# Patient Record
Sex: MALE | Race: WHITE | NOT HISPANIC OR LATINO | Employment: FULL TIME | URBAN - METROPOLITAN AREA
[De-identification: names, ages, dates, MRNs, and addresses within clinical notes are randomized per-mention and may not be internally consistent; named-entity substitution may affect disease eponyms.]

---

## 2017-01-13 DIAGNOSIS — I48.91 ATRIAL FIBRILLATION, UNSPECIFIED TYPE: Primary | ICD-10-CM

## 2017-01-17 ENCOUNTER — INITIAL CONSULT (OUTPATIENT)
Dept: ELECTROPHYSIOLOGY | Facility: CLINIC | Age: 72
End: 2017-01-17
Payer: MEDICARE

## 2017-01-17 ENCOUNTER — HOSPITAL ENCOUNTER (OUTPATIENT)
Dept: CARDIOLOGY | Facility: CLINIC | Age: 72
Discharge: HOME | End: 2017-01-17
Payer: MEDICARE

## 2017-01-17 VITALS
HEIGHT: 68 IN | DIASTOLIC BLOOD PRESSURE: 80 MMHG | SYSTOLIC BLOOD PRESSURE: 122 MMHG | BODY MASS INDEX: 29.7 KG/M2 | WEIGHT: 196 LBS | HEART RATE: 68 BPM

## 2017-01-17 DIAGNOSIS — I48.20 CHRONIC ATRIAL FIBRILLATION: ICD-10-CM

## 2017-01-17 DIAGNOSIS — K63.5 POLYP OF COLON, UNSPECIFIED PART OF COLON, UNSPECIFIED TYPE: ICD-10-CM

## 2017-01-17 DIAGNOSIS — E13.9 DIABETES MELLITUS DUE TO ABNORMAL INSULIN: Primary | ICD-10-CM

## 2017-01-17 DIAGNOSIS — I48.91 ATRIAL FIBRILLATION, UNSPECIFIED TYPE: ICD-10-CM

## 2017-01-17 DIAGNOSIS — I10 ESSENTIAL HYPERTENSION: ICD-10-CM

## 2017-01-17 PROCEDURE — 99999 PR PBB SHADOW E&M-EST. PATIENT-LVL III: CPT | Mod: PBBFAC,,, | Performed by: INTERNAL MEDICINE

## 2017-01-17 PROCEDURE — 99205 OFFICE O/P NEW HI 60 MIN: CPT | Mod: S$PBB,,, | Performed by: INTERNAL MEDICINE

## 2017-01-17 PROCEDURE — 93010 ELECTROCARDIOGRAM REPORT: CPT | Mod: S$PBB,,, | Performed by: INTERNAL MEDICINE

## 2017-01-17 PROCEDURE — 99213 OFFICE O/P EST LOW 20 MIN: CPT | Mod: PBBFAC | Performed by: INTERNAL MEDICINE

## 2017-01-17 NOTE — MR AVS SNAPSHOT
[facility]  [address]  [facility] [address]  Phone: [phone]  Fax: [phone]                  Erasmo Cherry   2017 11:00 AM   Initial consult    Description:  Male : 1945   Provider:  Harvey Blake MD   Department:  [facility]           Reason for Visit     Atrial Fibrillation           Diagnoses this Visit        Comments    Diabetes mellitus due to abnormal insulin    -  Primary     Chronic atrial fibrillation         Essential hypertension         Polyp of colon, unspecified part of colon, unspecified type                To Do List           Future Appointments        Provider Department Dept Phone    2017 11:00 AM MD [facility] - Arrhythmia [phone]    2017 1:00 PM ECHO, [facility] - Echo/Stress Lab [phone]      Goals (5 Years of Data)     Cut out extra servings       Follow-Up and Disposition     Return if symptoms worsen or fail to improve.    Follow-up and Disposition History      [facility]     [facility] On Call Nurse Care Line -  Assistance  Registered nurses in the [facility] On Call Center provide clinical advisement, health education, appointment booking, and other advisory services.  Call for this free service at [phone].             Medications           Message regarding Medications     Verify the changes and/or additions to your medication regime listed below are the same as discussed with your clinician today.  If any of these changes or additions are incorrect, please notify your healthcare provider.             Verify that the below list of medications is an accurate representation of the medications you are currently taking.  If none reported, the list may be blank. If incorrect, please contact your healthcare provider. Carry this list with you in case of emergency.           Current Medications     aspirin (ECOTRIN) 81 MG EC tablet Take 81 mg by mouth once daily.      atorvastatin  "(LIPITOR) 10 MG tablet Take 1 tablet (10 mg total) by mouth once daily.    empagliflozin (JARDIANCE) 25 mg Tab Take 25 mg by mouth once daily.    FREESTYLE LITE STRIPS Strp USE TO TEST TWICE A DAY AS NEEDED    glipiZIDE (GLUCOTROL) 2.5 MG TR24 Take 1 tablet (2.5 mg total) by mouth 2 (two) times daily with meals.    lisinopril (PRINIVIL,ZESTRIL) 20 MG tablet Take 1 tablet (20 mg total) by mouth once daily.    metformin (GLUCOPHAGE-XR) 750 MG 24 hr tablet Take 1 tablet (750 mg total) by mouth 2 (two) times daily with meals.    VITAMIN D2 50,000 unit capsule TAKE 1 CAPSULE EVERY 7 DAYS    vitamin E 800 UNIT capsule Take 800 Units by mouth once daily.           Clinical Reference Information           Vital Signs - Last Recorded  Most recent update: 1/17/2017 10:23 AM by Leonides Regan MA    BP Pulse Ht Wt BMI    122/80 68 5' 8" (1.727 m) 88.9 kg (195 lb 15.8 oz) 29.8 kg/m2      Blood Pressure          Most Recent Value    BP  122/80      Allergies as of 1/17/2017     No Known Allergies      Immunizations Administered on Date of Encounter - 1/17/2017     None      Orders Placed During Today's Visit     Future Labs/Procedures Expected by Expires    2D echo with color flow doppler  As directed 1/17/2018      [facility] Sign-Up     Activating your [facility] account is as easy as 1-2-3!     1) Visit [email or web address], select Sign Up Now, enter this activation code and your date of birth, then select Next.  [email or web address]  Expires: 1/29/2017  8:24 AM      2) Create a username and password to use when you visit [facility] in the future and select a security question in case you lose your password and select Next.    3) Enter your e-mail address and click Sign Up!    Additional Information  If you have questions, please e-mail [email or web address] or call [phone] to talk to our [facility] staff. Remember, [facility] is NOT to be used for urgent needs. For medical emergencies, dial 911.         "

## 2017-01-17 NOTE — LETTER
January 17, 2017      Chichi Stack MD  [address]  [address]  [facility] [address]           [facility] - Arrhythmia  [address]  [facility] [address]  Phone: [phone]  Fax: [phone]          Patient: Erasmo Cherry   MR Number: [id number]   YOB: 1945   Date of Visit: 1/17/2017       Dear Dr. Chichi Stack:    Thank you for referring Erasmo Cherry to me for evaluation. Attached you will find relevant portions of my assessment and plan of care.    If you have questions, please do not hesitate to call me. I look forward to following Erasmo Cherry along with you.    Sincerely,    Harvey Blake MD    Enclosure  CC:  No Recipients    If you would like to receive this communication electronically, please contact [email or web address] or [phone] to request more information on [email or web address] [facility] access.    For providers and/or their staff who would like to refer a patient to [facility], please contact us through our one-stop-shop provider referral line, [facility], at [phone].    If you feel you have received this communication in error or would no longer like to receive these types of communications, please e-mail [email or web address]

## 2017-01-17 NOTE — PROGRESS NOTES
Subjective:    Patient ID:  Erasmo Cherry is a 71 y.o. male who presents for evaluation of Atrial Fibrillation      HPI 72 yo ffemale with Htn, DM, atrial fibrillation, colon polyps.  Presented to PCP for routine visit, ECG 12/14/16 revealed atrial fibrillation, rate controlled.  He was asymptomatic.  ECG today again reveals atrial fibrillation with controlled ventricular response.  Reports feeling fine.  Denies palpitations, shortness of breath, fatigue, syncope.  [place]  Rides his bike one the weekends.  He is the  of [employer] at [employer].  CHADSVASc is 3, on aspirin.    Review of Systems   Constitution: Negative. Negative for weakness and malaise/fatigue.   Cardiovascular: Negative for chest pain, dyspnea on exertion, irregular heartbeat, leg swelling, near-syncope, orthopnea, palpitations, paroxysmal nocturnal dyspnea and syncope.   Respiratory: Negative for cough and shortness of breath.    Neurological: Negative for dizziness and light-headedness.   All other systems reviewed and are negative.       Objective:    Physical Exam   Constitutional: He is oriented to person, place, and time. He appears well-developed and well-nourished.   Eyes: Conjunctivae are normal. No scleral icterus.   Neck: No JVD present. No tracheal deviation present.   Cardiovascular: Normal rate and normal heart sounds.  An irregularly irregular rhythm present. PMI is not displaced.    Pulmonary/Chest: Effort normal and breath sounds normal. No respiratory distress.   Abdominal: Soft. There is no hepatosplenomegaly. There is no tenderness.   Musculoskeletal: He exhibits no edema (lower extremity) or tenderness.   Neurological: He is alert and oriented to person, place, and time.   Skin: Skin is warm and dry. No rash noted.   Psychiatric: He has a normal mood and affect. His behavior is normal.         Assessment:       1. Diabetes mellitus due to abnormal insulin     2. Chronic atrial fibrillation    3. Essential hypertension    4. Polyp of colon, unspecified part of colon, unspecified type         Plan:           Atrial fibrillation, rate controlled and asymptomatic.  Discussed at length management options.  Given CHADSVASc is 3, I recommended anticoagulation.  He is going to consider this, and contact us if he would like to proceed.  Echo with color flow.  In terms of rhythm management, I offered DCCV to assess for symptomatic benefit, if he were to initiate anticoagulation.  I also indicated a rate control strategy would be reasonable.  Will schedule f/u once he contacts us with how he would like to proceed.

## 2017-01-19 ENCOUNTER — HOSPITAL ENCOUNTER (OUTPATIENT)
Dept: CARDIOLOGY | Facility: CLINIC | Age: 72
Discharge: HOME | End: 2017-01-19
Payer: MEDICARE

## 2017-01-19 ENCOUNTER — TELEPHONE (OUTPATIENT)
Dept: ELECTROPHYSIOLOGY | Facility: CLINIC | Age: 72
End: 2017-01-19

## 2017-01-19 DIAGNOSIS — I48.20 CHRONIC ATRIAL FIBRILLATION: ICD-10-CM

## 2017-01-19 LAB
AORTIC VALVE REGURGITATION: ABNORMAL
DIASTOLIC DYSFUNCTION: YES
ESTIMATED PA SYSTOLIC PRESSURE: 33.4
MITRAL VALVE MOBILITY: ABNORMAL
MITRAL VALVE REGURGITATION: ABNORMAL
RETIRED EF AND QEF - SEE NOTES: 40 (ref 55–65)
TRICUSPID VALVE REGURGITATION: ABNORMAL

## 2017-01-19 PROCEDURE — 93306 TTE W/DOPPLER COMPLETE: CPT | Mod: PBBFAC | Performed by: INTERNAL MEDICINE

## 2017-01-19 NOTE — TELEPHONE ENCOUNTER
Called patient, left message asking him to return my call.  EF is 40%.  I am going to recommend stress test and holter.  After holter will plan on adding beta blocker if HR will tolerate.  Will again recommend DCCV.

## 2017-01-27 ENCOUNTER — TELEPHONE (OUTPATIENT)
Dept: ELECTROPHYSIOLOGY | Facility: CLINIC | Age: 72
End: 2017-01-27

## 2017-01-27 NOTE — TELEPHONE ENCOUNTER
----- Message from Kimber Ortiz MA sent at 1/27/2017 10:36 AM CST -----  Contact: self  Pt. had an echo on 1/19. Pt.(). is calling for results of echo. Please [phone] cell

## 2017-01-30 ENCOUNTER — TELEPHONE (OUTPATIENT)
Dept: ELECTROPHYSIOLOGY | Facility: CLINIC | Age: 72
End: 2017-01-30

## 2017-01-30 DIAGNOSIS — I48.20 CHRONIC ATRIAL FIBRILLATION: Primary | ICD-10-CM

## 2017-01-30 NOTE — TELEPHONE ENCOUNTER
----- Message from Leonides Regan MA sent at 1/27/2017  1:52 PM CST -----  Contact: self  Giorgi ABRAHAM,    See messages. Can you please enter the orders for the holter monitor and stress test? I'll call the pt to schedule once done.    Thanks,  Leonides  ----- Message -----     From: Kimber Ortiz MA     Sent: 1/27/2017  10:36 AM       To: Hayden Gabriel Staff    Pt. had an echo on 1/19. Pt.(). is calling for results of echo. Please [phone] cell

## 2017-01-31 DIAGNOSIS — I48.91 ATRIAL FIBRILLATION, UNSPECIFIED TYPE: Primary | ICD-10-CM

## 2017-02-06 DIAGNOSIS — I48.91 ATRIAL FIBRILLATION, UNSPECIFIED TYPE: Primary | ICD-10-CM

## 2017-03-03 ENCOUNTER — CLINICAL SUPPORT (OUTPATIENT)
Dept: CARDIOLOGY | Facility: CLINIC | Age: 72
End: 2017-03-03
Payer: MEDICARE

## 2017-03-03 ENCOUNTER — CLINICAL SUPPORT (OUTPATIENT)
Dept: ELECTROPHYSIOLOGY | Facility: CLINIC | Age: 72
End: 2017-03-03
Payer: MEDICARE

## 2017-03-03 DIAGNOSIS — I48.91 ATRIAL FIBRILLATION, UNSPECIFIED TYPE: ICD-10-CM

## 2017-03-03 DIAGNOSIS — I48.20 CHRONIC ATRIAL FIBRILLATION: ICD-10-CM

## 2017-03-03 LAB — DIASTOLIC DYSFUNCTION: NO

## 2017-03-03 PROCEDURE — 93016 CV STRESS TEST SUPVJ ONLY: CPT | Mod: S$PBB,,, | Performed by: INTERNAL MEDICINE

## 2017-03-03 PROCEDURE — 93227 XTRNL ECG REC<48 HR R&I: CPT | Mod: S$PBB,,, | Performed by: INTERNAL MEDICINE

## 2017-03-03 PROCEDURE — 78492 MYOCRD IMG PET MLT RST&STRS: CPT | Mod: 26,S$PBB,, | Performed by: INTERNAL MEDICINE

## 2017-03-03 PROCEDURE — 93018 CV STRESS TEST I&R ONLY: CPT | Mod: S$PBB,,, | Performed by: INTERNAL MEDICINE

## 2017-03-03 PROCEDURE — 93226 XTRNL ECG REC<48 HR SCAN A/R: CPT | Mod: PBBFAC | Performed by: INTERNAL MEDICINE

## 2017-03-06 ENCOUNTER — TELEPHONE (OUTPATIENT)
Dept: ELECTROPHYSIOLOGY | Facility: CLINIC | Age: 72
End: 2017-03-06

## 2017-03-06 NOTE — TELEPHONE ENCOUNTER
----- Message from Harvey Blake MD sent at 3/3/2017  4:54 PM CST -----  Pet stress is negative, please relay to patient

## 2017-03-06 NOTE — TELEPHONE ENCOUNTER
"Pt informed of negative Pet Stress Test results per Dr. Blake. Also informed results have already been released to his PP. He has an appt with PCP in "soon" and he will call me then to follow up and discuss possibly initiating Blood thinner. Denied further questions, needs and concerns.   "

## 2017-03-07 ENCOUNTER — TELEPHONE (OUTPATIENT)
Dept: ELECTROPHYSIOLOGY | Facility: CLINIC | Age: 72
End: 2017-03-07

## 2017-03-07 NOTE — TELEPHONE ENCOUNTER
----- Message from Harvey Blake MD sent at 3/7/2017  6:49 AM CST -----  Monitor reveals adequate control of HR.  Not likely going to be able to add beta blocker.  Again recommend initiation of anticoagulation and DCCV.

## 2017-03-08 DIAGNOSIS — Z86.010 PERSONAL HISTORY OF COLONIC POLYPS: Primary | ICD-10-CM

## 2017-03-08 RX ORDER — POLYETHYLENE GLYCOL 3350, SODIUM SULFATE ANHYDROUS, SODIUM BICARBONATE, SODIUM CHLORIDE, POTASSIUM CHLORIDE 236; 22.74; 6.74; 5.86; 2.97 G/4L; G/4L; G/4L; G/4L; G/4L
4 POWDER, FOR SOLUTION ORAL ONCE
Qty: 4000 ML | Refills: 0 | Status: SHIPPED | OUTPATIENT
Start: 2017-03-08 | End: 2017-03-08

## 2017-03-23 ENCOUNTER — LAB VISIT (OUTPATIENT)
Dept: LAB | Facility: OTHER | Age: 72
End: 2017-03-23
Attending: INTERNAL MEDICINE
Payer: MEDICARE

## 2017-03-23 DIAGNOSIS — R74.8 ELEVATED LIVER ENZYMES: ICD-10-CM

## 2017-03-23 DIAGNOSIS — E11.65 TYPE 2 DIABETES MELLITUS WITH HYPERGLYCEMIA, WITHOUT LONG-TERM CURRENT USE OF INSULIN: ICD-10-CM

## 2017-03-23 LAB
ALBUMIN SERPL BCP-MCNC: 4.3 G/DL
ALP SERPL-CCNC: 45 U/L
ALT SERPL W/O P-5'-P-CCNC: 23 U/L
ANION GAP SERPL CALC-SCNC: 12 MMOL/L
AST SERPL-CCNC: 21 U/L
BILIRUB DIRECT SERPL-MCNC: 0.8 MG/DL
BILIRUB SERPL-MCNC: 2.2 MG/DL
BUN SERPL-MCNC: 20 MG/DL
CALCIUM SERPL-MCNC: 9.8 MG/DL
CHLORIDE SERPL-SCNC: 105 MMOL/L
CHOLEST/HDLC SERPL: 2.5 {RATIO}
CO2 SERPL-SCNC: 24 MMOL/L
CREAT SERPL-MCNC: 1.2 MG/DL
EST. GFR  (AFRICAN AMERICAN): >60 ML/MIN/1.73 M^2
EST. GFR  (NON AFRICAN AMERICAN): >60 ML/MIN/1.73 M^2
ESTIMATED AVG GLUCOSE: 146 MG/DL
GLUCOSE SERPL-MCNC: 126 MG/DL
HBA1C MFR BLD HPLC: 6.7 %
HDL/CHOLESTEROL RATIO: 40.1 %
HDLC SERPL-MCNC: 137 MG/DL
HDLC SERPL-MCNC: 55 MG/DL
LDLC SERPL CALC-MCNC: 65 MG/DL
NONHDLC SERPL-MCNC: 82 MG/DL
POTASSIUM SERPL-SCNC: 4.7 MMOL/L
PROT SERPL-MCNC: 7.4 G/DL
SODIUM SERPL-SCNC: 141 MMOL/L
T4 FREE SERPL-MCNC: 1.01 NG/DL
TRIGL SERPL-MCNC: 85 MG/DL
TSH SERPL DL<=0.005 MIU/L-ACNC: 4.03 UIU/ML

## 2017-03-23 PROCEDURE — 83036 HEMOGLOBIN GLYCOSYLATED A1C: CPT

## 2017-03-23 PROCEDURE — 80061 LIPID PANEL: CPT

## 2017-03-23 PROCEDURE — 80076 HEPATIC FUNCTION PANEL: CPT

## 2017-03-23 PROCEDURE — 84439 ASSAY OF FREE THYROXINE: CPT

## 2017-03-23 PROCEDURE — 80048 BASIC METABOLIC PNL TOTAL CA: CPT

## 2017-03-23 PROCEDURE — 36415 COLL VENOUS BLD VENIPUNCTURE: CPT

## 2017-03-23 PROCEDURE — 84443 ASSAY THYROID STIM HORMONE: CPT

## 2017-03-27 ENCOUNTER — LAB VISIT (OUTPATIENT)
Dept: LAB | Facility: OTHER | Age: 72
End: 2017-03-27
Attending: INTERNAL MEDICINE
Payer: MEDICARE

## 2017-03-27 DIAGNOSIS — C61 PROSTATE CANCER: ICD-10-CM

## 2017-03-27 LAB — COMPLEXED PSA SERPL-MCNC: <0.01 NG/ML

## 2017-03-27 PROCEDURE — 84153 ASSAY OF PSA TOTAL: CPT

## 2017-03-27 PROCEDURE — 36415 COLL VENOUS BLD VENIPUNCTURE: CPT

## 2017-04-10 ENCOUNTER — SURGERY (OUTPATIENT)
Age: 72
End: 2017-04-10

## 2017-04-10 ENCOUNTER — HOSPITAL ENCOUNTER (OUTPATIENT)
Facility: HOSPITAL | Age: 72
Discharge: HOME | End: 2017-04-10
Attending: INTERNAL MEDICINE | Admitting: INTERNAL MEDICINE
Payer: MEDICARE

## 2017-04-10 ENCOUNTER — ANESTHESIA EVENT (OUTPATIENT)
Dept: ENDOSCOPY | Facility: HOSPITAL | Age: 72
End: 2017-04-10
Payer: MEDICARE

## 2017-04-10 ENCOUNTER — ANESTHESIA (OUTPATIENT)
Dept: ENDOSCOPY | Facility: HOSPITAL | Age: 72
End: 2017-04-10
Payer: OTHER GOVERNMENT

## 2017-04-10 VITALS
HEART RATE: 55 BPM | HEIGHT: 68 IN | WEIGHT: 190 LBS | DIASTOLIC BLOOD PRESSURE: 68 MMHG | SYSTOLIC BLOOD PRESSURE: 115 MMHG | RESPIRATION RATE: 18 BRPM | BODY MASS INDEX: 28.79 KG/M2 | OXYGEN SATURATION: 99 % | TEMPERATURE: 98 F

## 2017-04-10 DIAGNOSIS — R79.89 ELEVATED LFTS: ICD-10-CM

## 2017-04-10 DIAGNOSIS — K63.5 POLYP OF COLON, UNSPECIFIED PART OF COLON, UNSPECIFIED TYPE: Primary | ICD-10-CM

## 2017-04-10 LAB — POCT GLUCOSE: 149 MG/DL (ref 70–110)

## 2017-04-10 PROCEDURE — G0105 COLORECTAL SCRN; HI RISK IND: HCPCS | Mod: ,,, | Performed by: INTERNAL MEDICINE

## 2017-04-10 PROCEDURE — 25000003 PHARM REV CODE 250: Performed by: NURSE ANESTHETIST, CERTIFIED REGISTERED

## 2017-04-10 PROCEDURE — 25000003 PHARM REV CODE 250: Performed by: INTERNAL MEDICINE

## 2017-04-10 PROCEDURE — 37000008 HC ANESTHESIA 1ST 15 MINUTES: Performed by: INTERNAL MEDICINE

## 2017-04-10 PROCEDURE — G0105 COLORECTAL SCRN; HI RISK IND: HCPCS | Performed by: INTERNAL MEDICINE

## 2017-04-10 PROCEDURE — D9220A PRA ANESTHESIA: Mod: 33,CRNA,, | Performed by: NURSE ANESTHETIST, CERTIFIED REGISTERED

## 2017-04-10 PROCEDURE — 37000009 HC ANESTHESIA EA ADD 15 MINS: Performed by: INTERNAL MEDICINE

## 2017-04-10 PROCEDURE — D9220A PRA ANESTHESIA: Mod: 33,ANES,, | Performed by: ANESTHESIOLOGY

## 2017-04-10 PROCEDURE — 82962 GLUCOSE BLOOD TEST: CPT | Performed by: INTERNAL MEDICINE

## 2017-04-10 PROCEDURE — 63600175 PHARM REV CODE 636 W HCPCS: Performed by: NURSE ANESTHETIST, CERTIFIED REGISTERED

## 2017-04-10 RX ORDER — SODIUM CHLORIDE 9 MG/ML
INJECTION, SOLUTION INTRAVENOUS CONTINUOUS
Status: DISCONTINUED | OUTPATIENT
Start: 2017-04-10 | End: 2017-04-10 | Stop reason: HOSPADM

## 2017-04-10 RX ORDER — LIDOCAINE HCL/PF 100 MG/5ML
SYRINGE (ML) INTRAVENOUS
Status: DISCONTINUED | OUTPATIENT
Start: 2017-04-10 | End: 2017-04-10

## 2017-04-10 RX ORDER — PROPOFOL 10 MG/ML
VIAL (ML) INTRAVENOUS CONTINUOUS PRN
Status: DISCONTINUED | OUTPATIENT
Start: 2017-04-10 | End: 2017-04-10

## 2017-04-10 RX ORDER — PROPOFOL 10 MG/ML
VIAL (ML) INTRAVENOUS
Status: DISCONTINUED | OUTPATIENT
Start: 2017-04-10 | End: 2017-04-10

## 2017-04-10 RX ADMIN — PROPOFOL 150 MCG/KG/MIN: 10 INJECTION, EMULSION INTRAVENOUS at 08:04

## 2017-04-10 RX ADMIN — PROPOFOL 80 MG: 10 INJECTION, EMULSION INTRAVENOUS at 08:04

## 2017-04-10 RX ADMIN — SODIUM CHLORIDE: 0.9 INJECTION, SOLUTION INTRAVENOUS at 07:04

## 2017-04-10 RX ADMIN — LIDOCAINE HYDROCHLORIDE 40 MG: 20 INJECTION, SOLUTION INTRAVENOUS at 08:04

## 2017-04-10 NOTE — IP AVS SNAPSHOT
[facility]  [address]  [facility] [address]  Phone: [phone]           Patient Discharge Instructions   Our goal is to set you up for success. This packet includes information on your condition, medications, and your home care.  It will help you care for yourself to prevent having to return to the hospital.     Please ask your nurse if you have any questions.      There are many details to remember when preparing to leave the hospital. Here is what you will need to do:    1. Take your medicine. If you are prescribed medications, review your Medication List on the following pages. You may have new medications to  at the pharmacy and others that you'll need to stop taking. Review the instructions for how and when to take your medications. Talk with your doctor or nurses if you are unsure of what to do.     2. Go to your follow-up appointments. Specific follow-up information is listed in the following pages. Your may be contacted by a nurse or clinical provider about future appointments. Be sure we have all of the phone numbers to reach you. Please contact your provider's office if you are unable to make an appointment.     3. Watch for warning signs. Your doctor or nurse will give you detailed warning signs to watch for and when to call for assistance. These instructions may also include educational information about your condition. If you experience any of warning signs to your health, call your doctor.           [facility]  Unless otherwise directed by your provider, please   contact [facility], our nurse care line   that is available for 24/7 assistance.     [phone] (toll-free)     Registered nurses in the [facility] Center   provide: appointment scheduling, clinical advisement, health education, and other advisory services.                  ** Verify the list of medication(s) below is accurate and up to date. Carry this with you in case of  emergency. If your medications have changed, please notify your healthcare provider.             Medication List      CONTINUE taking these medications        Additional Info                      aspirin 81 MG EC tablet   Commonly known as:  ECOTRIN   Refills:  0   Dose:  81 mg    Instructions:  Take 81 mg by mouth once daily.     Begin Date    AM    Noon    PM    Bedtime       atorvastatin 10 MG tablet   Commonly known as:  LIPITOR   Quantity:  90 tablet   Refills:  1   Dose:  10 mg    Instructions:  Take 1 tablet (10 mg total) by mouth once daily.     Begin Date    AM    Noon    PM    Bedtime       empagliflozin 25 mg Tab   Commonly known as:  JARDIANCE   Quantity:  90 tablet   Refills:  1   Dose:  25 mg    Instructions:  Take 25 mg by mouth once daily.     Begin Date    AM    Noon    PM    Bedtime       FREESTYLE LITE STRIPS Strp   Quantity:  200 strip   Refills:  3   Generic drug:  blood sugar diagnostic    Instructions:  USE TO TEST TWICE A DAY AS NEEDED     Begin Date    AM    Noon    PM    Bedtime       glipiZIDE 2.5 MG Tr24   Commonly known as:  GLUCOTROL   Quantity:  180 tablet   Refills:  1   Dose:  2.5 mg    Instructions:  Take 1 tablet (2.5 mg total) by mouth 2 (two) times daily with meals.     Begin Date    AM    Noon    PM    Bedtime       lisinopril 20 MG tablet   Commonly known as:  PRINIVIL,ZESTRIL   Quantity:  90 tablet   Refills:  3   Dose:  20 mg    Instructions:  Take 1 tablet (20 mg total) by mouth once daily.     Begin Date    AM    Noon    PM    Bedtime       metformin 750 MG 24 hr tablet   Commonly known as:  GLUCOPHAGE-XR   Quantity:  180 tablet   Refills:  3   Dose:  750 mg    Instructions:  Take 1 tablet (750 mg total) by mouth 2 (two) times daily with meals.     Begin Date    AM    Noon    PM    Bedtime       VITAMIN D2 50,000 unit Cap   Quantity:  15 capsule   Refills:  1   Generic drug:  ergocalciferol    Instructions:  TAKE 1 CAPSULE EVERY 7 DAYS     Begin Date    AM    Noon    PM     "Bedtime       vitamin E 800 UNIT capsule   Refills:  0   Dose:  800 Units    Instructions:  Take 800 Units by mouth once daily.     Begin Date    AM    Noon    PM    Bedtime                  Please bring to all follow up appointments:    1. A copy of your discharge instructions.  2. All medicines you are currently taking in their original bottles.  3. Identification and insurance card.    Please arrive 15 minutes ahead of scheduled appointment time.    Please call 24 hours in advance if you must reschedule your appointment and/or time.        Your Scheduled Appointments     Aug 09, 2017  2:00 PM CDT   Established Patient with Chichi Stack MD    [facility]    [address], [address]  [facility] [address]   [phone]              Your Future Surgeries/Procedures     Apr 10, 2017   Surgery with Rubin Solano MD   [facility])    [address] [address] [phone]                Discharge Instructions     Future Orders    Diet general     Questions:    Total calories:      Fat restriction, if any:      Protein restriction, if any:      Na restriction, if any:      Fluid restriction:      Additional restrictions:          Admission Information     Date & Time Provider Department CSN    4/10/2017  7:16 AM Rubin Solano MD [facility] [id number]      Care Providers     Provider Role Specialty Primary office phone    Rubin Solano MD Attending Provider Gastroenterology [phone]    Rubin Solano MD Surgeon  Gastroenterology [phone]      Your Vitals Were     BP Pulse Temp Resp Height Weight    86/57 (BP Location: Left arm, Patient Position: Lying, BP Method: Automatic) 50 97.8 °F (36.6 °C) (Oral) 16 5' 8" (1.727 m) 86.2 kg (190 lb)    SpO2 BMI             95% 28.89 kg/m2         Recent Lab Values        2/10/2014 10/17/2014 2/9/2015 9/8/2015 1/21/2016 4/4/2016 12/13/2016 3/23/2017      8:34 AM  7:35 AM  " 7:09 AM  7:08 AM  7:17 AM  7:09 AM  7:05 AM  7:09 AM    A1C 9.4 (H) 7.5 (H) 7.6 (H) 7.5 (H) 7.5 (H) 7.6 (H) 8.3 (H) 6.7 (H)    Comment for A1C at  7:05 AM on 12/13/2016:  According to ADA guidelines, hemoglobin A1C <7.0% represents  optimal control in non-pregnant diabetic patients.  Different  metrics may apply to specific populations.   Standards of Medical Care in Diabetes - 2016.  For the purpose of screening for the presence of diabetes:  <5.7%     Consistent with the absence of diabetes  5.7-6.4%  Consistent with increasing risk for diabetes   (prediabetes)  >or=6.5%  Consistent with diabetes  Currently no consensus exists for use of hemoglobin A1C  for diagnosis of diabetes for children.      Comment for A1C at  7:09 AM on 3/23/2017:  According to ADA guidelines, hemoglobin A1C <7.0% represents  optimal control in non-pregnant diabetic patients.  Different  metrics may apply to specific populations.   Standards of Medical Care in Diabetes - 2016.  For the purpose of screening for the presence of diabetes:  <5.7%     Consistent with the absence of diabetes  5.7-6.4%  Consistent with increasing risk for diabetes   (prediabetes)  >or=6.5%  Consistent with diabetes  Currently no consensus exists for use of hemoglobin A1C  for diagnosis of diabetes for children.        Allergies as of 4/10/2017     No Known Allergies      Advance Directives     An advance directive is a document which, in the event you are no longer able to make decisions for yourself, tells your healthcare team what kind of treatment you do or do not want to receive, or who you would like to make those decisions for you.  If you do not currently have an advance directive, [facility] encourages you to create one.  For more information call:  [phone]), [phone] ([phone]),  or log on to [email or web address].        Language Assistance Services     ATTENTION: Language assistance services are available, free of charge.  Please call [phone].      ATENCIÓN: Si habla español, tiene a parker disposición servicios gratuitos de asistencia lingüística. Llame al [phone].     CHÚ Ý: N?u b?n nói Ti?ng Vi?t, có các d?ch v? h? tr? ngôn ng? mi?n phí dành cho b?n. G?i s? [phone].        Diabetes Discharge Instructions                                    [facility] complies with applicable Federal civil rights laws and does not discriminate on the basis of race, color, national origin, age, disability, or sex.

## 2017-04-10 NOTE — ANESTHESIA POSTPROCEDURE EVALUATION
"Anesthesia Post Evaluation    Patient: Erasmo Cherry    Procedure(s) Performed: Procedure(s) (LRB):  COLONOSCOPY (N/A)    Final Anesthesia Type: general  Patient location during evaluation: GI PACU  Patient participation: Yes- Able to Participate  Level of consciousness: awake and alert and oriented  Post-procedure vital signs: reviewed and stable  Pain management: adequate  Airway patency: patent  PONV status at discharge: No PONV  Anesthetic complications: no      Cardiovascular status: hemodynamically stable  Respiratory status: unassisted, spontaneous ventilation and room air  Hydration status: euvolemic  Follow-up not needed.        Visit Vitals    /68 (BP Location: Left arm, Patient Position: Sitting, BP Method: Automatic)    Pulse (!) 55    Temp 36.6 °C (97.8 °F) (Oral)    Resp 18    Ht 5' 8" (1.727 m)    Wt 86.2 kg (190 lb)    SpO2 99%    BMI 28.89 kg/m2       Pain/April Score: Pain Assessment Performed: Yes (4/10/2017  9:18 AM)  Presence of Pain: denies (4/10/2017  9:18 AM)  April Score: 10 (4/10/2017  9:03 AM)      "

## 2017-04-10 NOTE — ANESTHESIA RELEASE NOTE
"Anesthesia Release from PACU Note    Patient: Erasmo Cherry    Procedure(s) Performed: Procedure(s) (LRB):  COLONOSCOPY (N/A)    Anesthesia type: general    Post pain: Adequate analgesia    Post assessment: no apparent anesthetic complications, tolerated procedure well and no evidence of recall    Last Vitals:   Visit Vitals    /68 (BP Location: Left arm, Patient Position: Sitting, BP Method: Automatic)    Pulse (!) 55    Temp 36.6 °C (97.8 °F) (Oral)    Resp 18    Ht 5' 8" (1.727 m)    Wt 86.2 kg (190 lb)    SpO2 99%    BMI 28.89 kg/m2       Post vital signs: stable    Level of consciousness: awake, alert  and oriented    Nausea/Vomiting: no nausea/no vomiting    Complications: none    Airway Patency: patent    Respiratory: unassisted, spontaneous ventilation, room air    Cardiovascular: stable and blood pressure at baseline    Hydration: euvolemic  "

## 2017-04-10 NOTE — INTERVAL H&P NOTE
The patient has been examined and the H&P has been reviewed:    I concur with the findings and no changes have occurred since H&P was written.     History and Exam unchanged from visit. TA on last procedure    Procedure - Colonoscopy  Neck - supple  Plan of anesthesia - General  ASA - per anesthesia  Mallampati - per anesthesia  Anesthesia problems - no  Family history of anesthesia problems - no      Anesthesia/Surgery risks, benefits and alternative options discussed and understood by patient/family.          There are no hospital problems to display for this patient.

## 2017-04-10 NOTE — TRANSFER OF CARE
"Anesthesia Transfer of Care Note    Patient: Erasmo Cherry    Procedure(s) Performed: Procedure(s) (LRB):  COLONOSCOPY (N/A)    Patient location: PACU    Anesthesia Type: general    Transport from OR: Transported from OR on room air with adequate spontaneous ventilation    Post pain: adequate analgesia    Post assessment: no apparent anesthetic complications and tolerated procedure well    Post vital signs: stable    Level of consciousness: sedated    Nausea/Vomiting: no nausea/vomiting    Complications: none          Last vitals:   Visit Vitals    BP (!) 82/52 (BP Location: Left arm, Patient Position: Lying, BP Method: Automatic)    Pulse (!) 50    Temp 36.6 °C (97.8 °F) (Oral)    Resp 16    Ht 5' 8" (1.727 m)    Wt 86.2 kg (190 lb)    SpO2 95%    BMI 28.89 kg/m2     "

## 2017-04-10 NOTE — ANESTHESIA PREPROCEDURE EVALUATION
04/10/2017  Erasmo Cherry is a 71 y.o., male.    [facility] Anesthesia Evaluation    I have reviewed the Patient Summary Reports.    I have reviewed the Nursing Notes.   I have reviewed the Medications.     Review of Systems  Anesthesia Hx:  No problems with previous Anesthesia  History of prior surgery of interest to airway management or planning: Previous anesthesia: General Denies Family Hx of Anesthesia complications.   Denies Personal Hx of Anesthesia complications.   Social:  Non-Smoker    Hematology/Oncology:  Hematology Normal       -- Cancer in past history:  surgery  Oncology Comments: Prostate CA; basal cell carcinoma     EENT/Dental:EENT/Dental Normal   Cardiovascular:   Exercise tolerance: good Hypertension Dysrhythmias atrial fibrillation    Pulmonary:   Sleep Apnea, CPAP    Renal/:   Hx prostate CA, s/p prostatectomy   Hepatic/GI:   Liver Disease, Fatty liver   Musculoskeletal:  Musculoskeletal Normal    Neurological:   Guillain-[place]   Endocrine:   Diabetes, type 2    Dermatological:  Skin Normal    Psych:  Psychiatric Normal           Physical Exam  General:  Well nourished    Airway/Jaw/Neck:  Airway Findings: Mouth Opening: Normal Tongue: Normal  General Airway Assessment: Adult, Average  Mallampati: II  Improves to II with phonation.  TM Distance: Normal, at least 6 cm        Eyes/Ears/Nose:  EYES/EARS/NOSE FINDINGS: Normal   Dental:  Dental Findings: In tact   Chest/Lungs:  Chest/Lungs Findings: Clear to auscultation, Normal Respiratory Rate     Heart/Vascular:  Heart Findings: Rate: Normal  Rhythm: Regular Rhythm  Sounds: Normal  Heart murmur: negative Vascular Findings: Normal    Abdomen:  Abdomen Findings: Normal    Musculoskeletal:  Musculoskeletal Findings: Normal   Skin:  Skin Findings: Normal    Mental Status:  Mental Status Findings:  Cooperative, Alert and Oriented          Anesthesia Plan  Type of Anesthesia, risks & benefits discussed:  Anesthesia Type:  general  Patient's Preference:   Intra-op Monitoring Plan:   Intra-op Monitoring Plan Comments:   Post Op Pain Control Plan:   Post Op Pain Control Plan Comments:   Induction:   IV  Beta Blocker:  Patient is not currently on a Beta-Blocker (No further documentation required).       Informed Consent: Patient understands risks and agrees with Anesthesia plan.  Questions answered. Anesthesia consent signed with patient.  ASA Score: 2     Day of Surgery Review of History & Physical:            Ready For Surgery From Anesthesia Perspective.

## 2017-04-10 NOTE — H&P (VIEW-ONLY)
"Subjective:       Patient ID: Erasmo Cherry is a 71 y.o. male.    Chief Complaint: Follow-up    Erasmo Cherry is a 71 y.o. male here for a routine visit on .3/27/2017  .     HPI   He is here for follow up.  Doing well. Will be 72 in May.  No new issues. Got letter to see hep again and will.  PSa due in April and sees uro in July.   No s/s of hypoglycemia.  Better with diet.  Eating less.    Did see cards about afib. Stress and holter ok.  Declined blood thinner at time but willing to proceed.        Blood pressure 118/74, pulse 66, temperature 98.4 °F (36.9 °C), height 5' 8" (1.727 m), weight 89.4 kg (197 lb).  Wt Readings from Last 3 Encounters:   03/27/17 89.4 kg (197 lb)   01/17/17 88.9 kg (195 lb 15.8 oz)   12/15/16 91.1 kg (200 lb 12.8 oz)       Review of Systems   Constitutional: Negative for chills, fatigue, fever and unexpected weight change.   HENT: Negative for postnasal drip.    Eyes: Negative.    Respiratory: Negative for cough, chest tightness, shortness of breath and wheezing.    Gastrointestinal: Negative for abdominal distention, abdominal pain, constipation, diarrhea and vomiting.   Genitourinary: Negative for difficulty urinating, frequency, hematuria and urgency.   Musculoskeletal: Negative.  Negative for back pain, joint swelling and myalgias.   Skin: Negative for pallor.   Neurological: Negative for dizziness, syncope, weakness, light-headedness and headaches.   Psychiatric/Behavioral: Negative for agitation, confusion and sleep disturbance.       Objective:      Physical Exam   Constitutional: He is oriented to person, place, and time. He appears well-developed and well-nourished. No distress.   HENT:   Head: Normocephalic and atraumatic.   Mouth/Throat: No oropharyngeal exudate.   Eyes: Pupils are equal, round, and reactive to light. Right eye exhibits no discharge. Left eye exhibits no discharge.   Neck: Normal range of motion. Neck supple. No JVD present. No thyromegaly present. "   Cardiovascular: Normal rate, regular rhythm and normal heart sounds.  Exam reveals no gallop and no friction rub.    No murmur heard.  Pulmonary/Chest: Effort normal and breath sounds normal. No stridor.   Abdominal: Soft. Bowel sounds are normal. He exhibits no distension and no mass. There is no tenderness. There is no rebound and no guarding.   Musculoskeletal: Normal range of motion. He exhibits no edema or tenderness.   Lymphadenopathy:     He has no cervical adenopathy.   Neurological: He is alert and oriented to person, place, and time. No cranial nerve deficit. Coordination normal.   Skin: Skin is warm and dry. No rash noted. No erythema. No pallor.   Psychiatric: He has a normal mood and affect. His behavior is normal. Judgment normal.     Reviewed labs.       Assessment & Plan       Prostate cancer  -     PROSTATE SPECIFIC ANTIGEN, DIAGNOSTIC; Future; Expected date: 3/27/17    1.  DM.  Cont meds but drop one glip a day if bs go lower.  Reviewed hypo.  They are much better.   2.TSH elevated.  No s/s of hypothyroid.  Will monitor.    3.  Will reach out to Dr Blake and see what blood thinner he wants to start and will arange follow up appt with dr Blake too.   4. Pt will seee hep. Due in May.    5. PSA to be done in April since due.    See me in July/ august or sooner prn.

## 2017-04-17 ENCOUNTER — TELEPHONE (OUTPATIENT)
Dept: ENDOSCOPY | Facility: HOSPITAL | Age: 72
End: 2017-04-17

## 2017-05-01 ENCOUNTER — TELEPHONE (OUTPATIENT)
Dept: ELECTROPHYSIOLOGY | Facility: CLINIC | Age: 72
End: 2017-05-01

## 2017-05-01 NOTE — TELEPHONE ENCOUNTER
Called pt to offer 5/3/17 appt with Dr. Blake. Pt declined, because he will be in [place].  Will try to call next week to schedule

## 2017-05-01 NOTE — TELEPHONE ENCOUNTER
----- Message from Leonides Regan MA sent at 4/28/2017  4:33 PM CDT -----  Pt need a f/u appt from his colonoscopy and it's needed sooner than June/July. Please call Darell at the number listed.     Thanks

## 2017-06-14 ENCOUNTER — TELEPHONE (OUTPATIENT)
Dept: SLEEP MEDICINE | Facility: CLINIC | Age: 72
End: 2017-06-14

## 2017-06-14 ENCOUNTER — OFFICE VISIT (OUTPATIENT)
Dept: SLEEP MEDICINE | Facility: CLINIC | Age: 72
End: 2017-06-14
Attending: INTERNAL MEDICINE
Payer: MEDICARE

## 2017-06-14 VITALS
WEIGHT: 199.06 LBS | HEART RATE: 66 BPM | BODY MASS INDEX: 30.17 KG/M2 | SYSTOLIC BLOOD PRESSURE: 117 MMHG | DIASTOLIC BLOOD PRESSURE: 76 MMHG | HEIGHT: 68 IN

## 2017-06-14 DIAGNOSIS — G47.33 OSA (OBSTRUCTIVE SLEEP APNEA): Primary | ICD-10-CM

## 2017-06-14 PROCEDURE — 1126F AMNT PAIN NOTED NONE PRSNT: CPT | Mod: ,,, | Performed by: PSYCHIATRY & NEUROLOGY

## 2017-06-14 PROCEDURE — 99213 OFFICE O/P EST LOW 20 MIN: CPT | Mod: PBBFAC | Performed by: PSYCHIATRY & NEUROLOGY

## 2017-06-14 PROCEDURE — 1159F MED LIST DOCD IN RCRD: CPT | Mod: ,,, | Performed by: PSYCHIATRY & NEUROLOGY

## 2017-06-14 PROCEDURE — 99999 PR PBB SHADOW E&M-EST. PATIENT-LVL III: CPT | Mod: PBBFAC,,, | Performed by: PSYCHIATRY & NEUROLOGY

## 2017-06-14 PROCEDURE — 99203 OFFICE O/P NEW LOW 30 MIN: CPT | Mod: S$PBB,,, | Performed by: PSYCHIATRY & NEUROLOGY

## 2017-06-14 NOTE — LETTER
June 14, 2017      Chichi Stack MD  [address]  [address]  [facility] [address]           [facility]  [address]  [facility] [address]  Phone: [phone]          Patient: Erasmo Cherry   MR Number: [id number]   YOB: 1945   Date of Visit: 6/14/2017       Dear Dr. Chichi Stack:    Thank you for referring Erasmo Cherry to me for evaluation. Attached you will find relevant portions of my assessment and plan of care.    If you have questions, please do not hesitate to call me. I look forward to following Erasmo Cherry along with you.    Sincerely,    Brayan Llanes MD    Enclosure  CC:  No Recipients    If you would like to receive this communication electronically, please contact [email or web address] or [phone] to request more information on [employer] access.    For providers and/or their staff who would like to refer a patient to [facility], please contact us through our one-stop-shop provider referral line, [facility], at [phone].    If you feel you have received this communication in error or would no longer like to receive these types of communications, please e-mail [email or web address]

## 2017-06-14 NOTE — PATIENT INSTRUCTIONS
Treatment options of sleep apnea discussed. In light of chronic atrial fibrillation, will plan to continue CPAP as opposed to switching to oral appliance    1. Order for new CPAP supplies; Nasal mask per patient's preference  2. Continue CPAP set at 9 cm  3. Set up with local Medicare approved DME  4. Patient will need to obtain his baseline sleep study for medical documentation with us and at his DME for Medicare supply coverage.

## 2017-06-14 NOTE — TELEPHONE ENCOUNTER
Faxed over Authorization of Release Form to [facility] fax# [phone]. Requesting sleep study reports per Dr. Llanes.

## 2017-06-14 NOTE — PROGRESS NOTES
This 72 y.o. male patient presents for the evaluation of obstructive sleep apnea.    PRIOR SLEEP STUDY at outside facility.    Reference to baseline study on titration described baseline AHI 10.2  TITRATION SLEEP STUDY [facility] 6-10-09: Titrated from 4 to 9 cm to eliminate ABE; supine REM seen; final AHI 0    ESS = 4    He has been using CPAP successfully but needs new supplies;  No discontinuation in using CPAP since starting in 2009.    ABE symptoms: snoring and witnessed apnea  These symptoms are resolved with CPAP.  He benefits from CPAP use.    New diagnosis of afib, started xarelto    SLEEP ROUTINE:   Bed partner: none   Time to bed: 9-10 pm   Sleep onset latency: 20-30 mins   Disruptions or awakenings: 3-4   Wakeup time: 5:30-6:30 am   Perceived sleep quality: fair   Daytime naps: none    Past Medical History:   Diagnosis Date    Atrial fibrillation     Basal cell carcinoma     right upper back, left upper arm     Colon polyps     tubular adenoma, repeat in 2016    Diabetes mellitus     Guillain-[place]     patient denies history of this condition    Hypertension     Prostate CA     Prostate cancer        Past Surgical History:   Procedure Laterality Date    BASAL CELL CARCINOMA EXCISION      COLONOSCOPY N/A 4/10/2017    Procedure: COLONOSCOPY;  Surgeon: Rubin Solano MD;  Location: [facility] ([address] [place] [address]);  Service: Endoscopy;  Laterality: N/A;    PROSTATECTOMY      TONSILLECTOMY         Family History   Problem Relation Age of Onset    Diabetes Mother     Heart disease Father     Asthma Sister     Diabetes Sister     Skin cancer Neg Hx     Melanoma Neg Hx        Social History   Substance Use Topics    Smoking status: Never Smoker    Smokeless tobacco: Not on file    Alcohol use Yes      Comment: socially       ALLERGIES: Reviewed in EPIC    CURRENT MEDICATIONS: Reviewed in EPIC    REVIEW OF SYSTEMS:  Sleep related symptoms as per HPI;    Denies weight gain;    Denies  "sinus problems;    Denies dyspnea;    Denies palpitations;    Denies acid reflux;    Denies polyuria;    Denies headaches;    Denies mood disturbance;    Denies anemia;    Otherwise, a balance of systems reviewed is negative.    PHYSICAL EXAM:  /76 (BP Location: Left arm, Patient Position: Sitting, BP Method: Automatic)   Pulse 66   Ht 5' 8" (1.727 m)   Wt 90.3 kg (199 lb 1.2 oz)   BMI 30.27 kg/m²   GENERAL: Well groomed; Normally developed; Overweight  HEENT: Conjunctivae are non-erythematous; Pupils equal, round, and reactive to light;    Nose is symmetrical; Nasal mucosa is pink and moist; Septum is midline;    Turbinates are normal; Nasal airflow is normal;   Posterior pharynx is pink; Posterior palate is normal; Modified Mallampati: IV   Uvula is normal; Tongue is normal; Tonsils +1;    Dentition is fair; No TMJ tenderness;    Jaw opening and protrusion without click and without discomfort.  NECK: Supple. No thyromegaly. No palpable nodes. Neck circumference in inches is 16  SKIN: On face and neck: No abrasions, no rashes, no lesions.     No subcutaneous nodules are palpable.  RESPIRATORY: Chest is clear to auscultation.     Normal chest expansion and non-labored breathing at rest.  CARDIOVASCULAR: Normal S1, S2.  No murmurs, gallops or rubs.   No carotid bruits bilaterally.  EXTREMITIES: No clubbing. No cyanosis. Edema is absent.   NEURO: Oriented to time, place and person.    Normal attention span and concentration.  Station normal. Gait normal.  PSYCH: Affect is full. Mood is normal.      ASSESSMENT:    1. Obstructive Sleep Apnea, mild by AHI (testing 2009). The patient symptomatically has snoring and witnessed apnea and recent finding of atrial fibrillation. Patient has used CPAP since initial diagnosis. Patient in need of DME supplier and new CPAP supplies; Prior CPAP setting determined by titration sleep study; set at 9 cm    He has medical co-morbidities of atrial fibrillation, hypertension and " obesity.          PLAN:    Treatment options discussed. In light of chronic atrial fibrillation, will plan to continue CPAP as opposed to switching to oral appliance    1. Order for new CPAP supplies; Nasal mask per patient's preference  2. CPAP set at 9 cm  3. Set up with local Medicare approved DME  4. Patient will need to obtain his baseline sleep study for medical documentation with us and at his DME for Medicare supply coverage.    Education: During our discussion today, we talked about the etiology of obstructive sleep apnea as well as the potential ramifications of untreated sleep apnea, which could include daytime sleepiness, hypertension, heart disease and/or stroke.  We discussed potential treatment options, which could include weight loss, body positioning, use of an oral appliance, continuous positive airway pressure (CPAP), EPAP, or referral for surgical consideration.    Precautions: The patient was advised to abstain from driving should they feel sleepy or drowsy.    Follow up: 6 weeks. MD/NP

## 2017-07-31 ENCOUNTER — OFFICE VISIT (OUTPATIENT)
Dept: SLEEP MEDICINE | Facility: CLINIC | Age: 72
End: 2017-07-31
Payer: MEDICARE

## 2017-07-31 VITALS
DIASTOLIC BLOOD PRESSURE: 77 MMHG | BODY MASS INDEX: 30.37 KG/M2 | SYSTOLIC BLOOD PRESSURE: 123 MMHG | HEART RATE: 76 BPM | HEIGHT: 68 IN | WEIGHT: 200.38 LBS

## 2017-07-31 DIAGNOSIS — G47.33 OSA (OBSTRUCTIVE SLEEP APNEA): Primary | ICD-10-CM

## 2017-07-31 PROCEDURE — 99213 OFFICE O/P EST LOW 20 MIN: CPT | Mod: S$PBB,,, | Performed by: PSYCHIATRY & NEUROLOGY

## 2017-07-31 PROCEDURE — 99999 PR PBB SHADOW E&M-EST. PATIENT-LVL III: CPT | Mod: PBBFAC,,, | Performed by: PSYCHIATRY & NEUROLOGY

## 2017-07-31 PROCEDURE — 1126F AMNT PAIN NOTED NONE PRSNT: CPT | Mod: ,,, | Performed by: PSYCHIATRY & NEUROLOGY

## 2017-07-31 PROCEDURE — 1159F MED LIST DOCD IN RCRD: CPT | Mod: ,,, | Performed by: PSYCHIATRY & NEUROLOGY

## 2017-07-31 PROCEDURE — 99213 OFFICE O/P EST LOW 20 MIN: CPT | Mod: PBBFAC | Performed by: PSYCHIATRY & NEUROLOGY

## 2017-07-31 NOTE — PROGRESS NOTES
This 72 y.o. male patient returns for the management of obstructive sleep apnea.    Patient brings a copy of his original baseline sleep study today.  He is waiting for CPAP supplies from [facility]    DME = [facility]    CPAP is a Resmed ELITE II: set at ramp 4, CPAP 9 cm    PRIOR SLEEP STUDY 5/1/09: +ABE, baseline AHI 10.2  TITRATION SLEEP STUDY [facility] 6-10-09: Titrated from 4 to 9 cm to eliminate ABE; supine REM seen; final AHI 0    ESS = 4    He has been using CPAP successfully but needs new supplies;  No discontinuation in using CPAP since starting in 2009.    ABE symptoms: snoring and witnessed apnea  These symptoms are resolved with CPAP.  He benefits from CPAP use.    New diagnosis of afib, started xarelto    SLEEP ROUTINE:   Bed partner: none   Time to bed: 9-10 pm   Sleep onset latency: 20-30 mins   Disruptions or awakenings: 3-4   Wakeup time: 5:30-6:30 am   Perceived sleep quality: fair   Daytime naps: none    Past Medical History:   Diagnosis Date    Atrial fibrillation     Basal cell carcinoma     right upper back, left upper arm     Colon polyps     tubular adenoma, repeat in 2016    Diabetes mellitus     Guillain-[place]     patient denies history of this condition    Hypertension     Prostate CA     Prostate cancer        Past Surgical History:   Procedure Laterality Date    BASAL CELL CARCINOMA EXCISION      COLONOSCOPY N/A 4/10/2017    Procedure: COLONOSCOPY;  Surgeon: Rubin Solano MD;  Location: [facility] ([address] [place] [address]);  Service: Endoscopy;  Laterality: N/A;    PROSTATECTOMY      TONSILLECTOMY         Family History   Problem Relation Age of Onset    Diabetes Mother     Heart disease Father     Asthma Sister     Diabetes Sister     Skin cancer Neg Hx     Melanoma Neg Hx        Social History   Substance Use Topics    Smoking status: Never Smoker    Smokeless tobacco: Not on file    Alcohol use Yes      Comment: socially       ALLERGIES: Reviewed in EPIC    CURRENT  "MEDICATIONS: Reviewed in EPIC    REVIEW OF SYSTEMS:  Sleep related symptoms as per HPI;    Denies weight gain;    Otherwise, a balance of systems reviewed is negative.    PHYSICAL EXAM:  /77 (BP Location: Right arm, Patient Position: Sitting)   Pulse 76   Ht 5' 8" (1.727 m)   Wt 90.9 kg (200 lb 6.4 oz)   BMI 30.47 kg/m²       ASSESSMENT:    1. Obstructive Sleep Apnea, mild by AHI (testing 2009). The patient symptomatically has snoring and witnessed apnea and recent finding of atrial fibrillation. Patient has used CPAP since initial diagnosis. Patient in need of DME supplier and new CPAP supplies; Prior CPAP setting determined by titration sleep study; set at 9 cm; His machine is in good working order.    He has medical co-morbidities of atrial fibrillation, hypertension and obesity.          PLAN:    Treatment options discussed. In light of chronic atrial fibrillation, will plan to continue CPAP as opposed to switching to oral appliance    1. Order for new CPAP supplies; Nasal mask per patient's preference  2. CPAP set at 9 cm  3. Set up with local Medicare approved DME = [facility]  4. Patient brought his baseline sleep study for medical documentation with us and at his DME for Medicare supply coverage.    Education: During our discussion today, we talked about the etiology of obstructive sleep apnea as well as the potential ramifications of untreated sleep apnea, which could include daytime sleepiness, hypertension, heart disease and/or stroke.  We discussed potential treatment options, which could include weight loss, body positioning, use of an oral appliance, continuous positive airway pressure (CPAP), EPAP, or referral for surgical consideration.    Precautions: The patient was advised to abstain from driving should they feel sleepy or drowsy.    Follow up: 6 weeks. MD/NP  "

## 2017-08-03 ENCOUNTER — TELEPHONE (OUTPATIENT)
Dept: SLEEP MEDICINE | Facility: CLINIC | Age: 72
End: 2017-08-03

## 2017-08-03 DIAGNOSIS — G47.33 OBSTRUCTIVE SLEEP APNEA: Primary | ICD-10-CM

## 2017-08-03 NOTE — TELEPHONE ENCOUNTER
----- Message from Poncho Brush sent at 8/3/2017  9:06 AM CDT -----  Contact: Esther Betancur  X_  1st Request  _  2nd Request  _  3rd Request        Who: Esther Betancur    Why: Called to verify orders on this patient. Please call back to follow up.    What Number to Call Back: [phone]    When to Expect a call back: (With in 24 hours)

## 2017-08-10 ENCOUNTER — TELEPHONE (OUTPATIENT)
Dept: SLEEP MEDICINE | Facility: CLINIC | Age: 72
End: 2017-08-10

## 2017-08-11 ENCOUNTER — TELEPHONE (OUTPATIENT)
Dept: SLEEP MEDICINE | Facility: CLINIC | Age: 72
End: 2017-08-11

## 2017-08-11 ENCOUNTER — PATIENT MESSAGE (OUTPATIENT)
Dept: SLEEP MEDICINE | Facility: CLINIC | Age: 72
End: 2017-08-11

## 2017-08-15 ENCOUNTER — LAB VISIT (OUTPATIENT)
Dept: LAB | Facility: OTHER | Age: 72
End: 2017-08-15
Attending: INTERNAL MEDICINE
Payer: MEDICARE

## 2017-08-15 DIAGNOSIS — I10 UNSPECIFIED ESSENTIAL HYPERTENSION: ICD-10-CM

## 2017-08-15 DIAGNOSIS — E13.9 DIABETES MELLITUS DUE TO ABNORMAL INSULIN: ICD-10-CM

## 2017-08-15 DIAGNOSIS — R94.5 ABNORMAL LIVER FUNCTION: ICD-10-CM

## 2017-08-15 LAB
ALBUMIN SERPL BCP-MCNC: 4.2 G/DL
ALP SERPL-CCNC: 56 U/L
ALT SERPL W/O P-5'-P-CCNC: 21 U/L
ANION GAP SERPL CALC-SCNC: 13 MMOL/L
AST SERPL-CCNC: 19 U/L
BASOPHILS # BLD AUTO: 0.01 K/UL
BASOPHILS NFR BLD: 0.1 %
BILIRUB SERPL-MCNC: 2.3 MG/DL
BUN SERPL-MCNC: 22 MG/DL
CALCIUM SERPL-MCNC: 10.1 MG/DL
CHLORIDE SERPL-SCNC: 103 MMOL/L
CO2 SERPL-SCNC: 23 MMOL/L
CREAT SERPL-MCNC: 1.1 MG/DL
DIFFERENTIAL METHOD: NORMAL
EOSINOPHIL # BLD AUTO: 0.2 K/UL
EOSINOPHIL NFR BLD: 1.9 %
ERYTHROCYTE [DISTWIDTH] IN BLOOD BY AUTOMATED COUNT: 12.7 %
EST. GFR  (AFRICAN AMERICAN): >60 ML/MIN/1.73 M^2
EST. GFR  (NON AFRICAN AMERICAN): >60 ML/MIN/1.73 M^2
ESTIMATED AVG GLUCOSE: 154 MG/DL
GLUCOSE SERPL-MCNC: 131 MG/DL
HBA1C MFR BLD HPLC: 7 %
HCT VFR BLD AUTO: 47.5 %
HGB BLD-MCNC: 16.5 G/DL
LYMPHOCYTES # BLD AUTO: 2.8 K/UL
LYMPHOCYTES NFR BLD: 36.2 %
MCH RBC QN AUTO: 30.6 PG
MCHC RBC AUTO-ENTMCNC: 34.7 G/DL
MCV RBC AUTO: 88 FL
MONOCYTES # BLD AUTO: 0.6 K/UL
MONOCYTES NFR BLD: 8.3 %
NEUTROPHILS # BLD AUTO: 4.1 K/UL
NEUTROPHILS NFR BLD: 53.4 %
PLATELET # BLD AUTO: 191 K/UL
PMV BLD AUTO: 10.3 FL
POTASSIUM SERPL-SCNC: 4.6 MMOL/L
PROT SERPL-MCNC: 7.8 G/DL
RBC # BLD AUTO: 5.4 M/UL
SODIUM SERPL-SCNC: 139 MMOL/L
TSH SERPL DL<=0.005 MIU/L-ACNC: 2.98 UIU/ML
WBC # BLD AUTO: 7.7 K/UL

## 2017-08-15 PROCEDURE — 85025 COMPLETE CBC W/AUTO DIFF WBC: CPT

## 2017-08-15 PROCEDURE — 36415 COLL VENOUS BLD VENIPUNCTURE: CPT

## 2017-08-15 PROCEDURE — 80053 COMPREHEN METABOLIC PANEL: CPT

## 2017-08-15 PROCEDURE — 84443 ASSAY THYROID STIM HORMONE: CPT

## 2017-08-15 PROCEDURE — 83036 HEMOGLOBIN GLYCOSYLATED A1C: CPT

## 2017-08-25 PROBLEM — E78.5 HYPERLIPIDEMIA: Status: ACTIVE | Noted: 2017-08-25

## 2017-11-02 ENCOUNTER — OFFICE VISIT (OUTPATIENT)
Dept: SLEEP MEDICINE | Facility: CLINIC | Age: 72
End: 2017-11-02
Payer: MEDICARE

## 2017-11-02 VITALS
DIASTOLIC BLOOD PRESSURE: 80 MMHG | SYSTOLIC BLOOD PRESSURE: 123 MMHG | HEART RATE: 70 BPM | WEIGHT: 200.19 LBS | BODY MASS INDEX: 30.44 KG/M2

## 2017-11-02 DIAGNOSIS — G47.33 OSA (OBSTRUCTIVE SLEEP APNEA): Primary | ICD-10-CM

## 2017-11-02 PROCEDURE — 99999 PR PBB SHADOW E&M-EST. PATIENT-LVL II: CPT | Mod: PBBFAC,,, | Performed by: PSYCHIATRY & NEUROLOGY

## 2017-11-02 PROCEDURE — 99213 OFFICE O/P EST LOW 20 MIN: CPT | Mod: S$PBB,,, | Performed by: PSYCHIATRY & NEUROLOGY

## 2017-11-02 PROCEDURE — 99212 OFFICE O/P EST SF 10 MIN: CPT | Mod: PBBFAC | Performed by: PSYCHIATRY & NEUROLOGY

## 2017-11-02 NOTE — PROGRESS NOTES
This 72 y.o. male patient returns for the management of obstructive sleep apnea.    Doing ok with CPAP, nightly use;  No episodes of Afib; xarelto  ABE symptoms: snoring and witnessed apnea  These symptoms are resolved with CPAP.  He benefits from CPAP use.    DME = [facility]    CPAP is a Resmed ELITE II: 2496 hrs; set at ramp 4, CPAP 9 cm    PRIOR SLEEP STUDY 5/1/09: +ABE, baseline AHI 10.2  TITRATION SLEEP STUDY [facility] 6-10-09: Titrated from 4 to 9 cm to eliminate ABE; supine REM seen; final AHI 0    ESS = 3    He has been using CPAP successfully but needs new supplies;  No discontinuation in using CPAP since starting in 2009.    SLEEP ROUTINE:   Bed partner: none   Time to bed: 9-10 pm   Sleep onset latency: 20-30 mins   Disruptions or awakenings: 3-4   Wakeup time: 5:30-6:30 am   Perceived sleep quality: fair   Daytime naps: none    Past Medical History:   Diagnosis Date    Atrial fibrillation     Basal cell carcinoma     right upper back, left upper arm     Colon polyps     tubular adenoma, repeat in 2016    Diabetes mellitus     Guillain-[place]     patient denies history of this condition    Hypertension     Prostate CA     Prostate cancer        Past Surgical History:   Procedure Laterality Date    BASAL CELL CARCINOMA EXCISION      COLONOSCOPY N/A 4/10/2017    Procedure: COLONOSCOPY;  Surgeon: Rubin Solano MD;  Location: [facility] ([address] [place] [address]);  Service: Endoscopy;  Laterality: N/A;    PROSTATECTOMY      TONSILLECTOMY         Family History   Problem Relation Age of Onset    Diabetes Mother     Heart disease Father     Asthma Sister     Diabetes Sister     Skin cancer Neg Hx     Melanoma Neg Hx        Social History   Substance Use Topics    Smoking status: Never Smoker    Smokeless tobacco: Not on file    Alcohol use Yes      Comment: socially       ALLERGIES: Reviewed in EPIC    CURRENT MEDICATIONS: Reviewed in EPIC    REVIEW OF SYSTEMS:  Sleep related symptoms as per  HPI;    Denies weight gain;    Otherwise, a balance of systems reviewed is negative.    PHYSICAL EXAM:  /80 (BP Location: Right arm, Patient Position: Sitting)   Pulse 70   Wt 90.8 kg (200 lb 2.8 oz)   BMI 30.44 kg/m²       ASSESSMENT:    1. Obstructive Sleep Apnea, mild by AHI (testing 2009). The patient symptomatically has snoring and witnessed apnea and recent finding of atrial fibrillation. Patient has used CPAP since initial diagnosis. Patient in need of DME supplier and new CPAP supplies; Prior CPAP setting determined by titration sleep study; set at 9 cm; His machine is in good working order.    He has medical co-morbidities of atrial fibrillation, hypertension and obesity.          PLAN:    Treatment options discussed. In light of chronic atrial fibrillation, will plan to continue CPAP as opposed to switching to oral appliance    1. Nasal mask per patient's preference  2. Continue CPAP set at 9 cm  3. DME = [facility]     Precautions: The patient was advised to abstain from driving should they feel sleepy or drowsy.    Follow up: Annually. MD/NP

## 2018-06-18 DIAGNOSIS — I48.91 ATRIAL FIBRILLATION, UNSPECIFIED TYPE: Primary | ICD-10-CM

## 2018-07-16 PROBLEM — I48.19 PERSISTENT ATRIAL FIBRILLATION: Status: ACTIVE | Noted: 2017-01-17

## 2018-07-16 PROBLEM — I42.8 CARDIOMYOPATHY, NONISCHEMIC: Status: ACTIVE | Noted: 2018-07-16

## 2018-07-16 NOTE — PROGRESS NOTES
Subjective:    Patient ID:  Erasmo Cherry is a 73 y.o. male who presents for follow-up of Atrial Fibrillation      HPI 74 yo male with Htn, DM, atrial fibrillation, colon polyps, nonischemic cardiomyopathy.  Presented to PCP for routine visit, ECG 12/14/16 revealed atrial fibrillation, rate controlled.  He was asymptomatic.  At f/u he remained in atrial fibrillation with controlled ventricular response.  He reported feeling fine.  Denies palpitations, shortness of breath, fatigue, syncope.  He is the  of [employer] [facility].  We initiated Eliquis 5 mg BID.  Echo 1/19/17 EF 40-45% GENARO 46.89  I recommended DCCV.  He deferred.  Pet stress 3/30/17 negative for ischemia.  He remains active, riding his bike.  Less so this summer, because he is busy.      Review of Systems   Constitution: Negative. Negative for weakness and malaise/fatigue.   Cardiovascular: Negative for chest pain, dyspnea on exertion, irregular heartbeat, leg swelling, near-syncope, orthopnea, palpitations, paroxysmal nocturnal dyspnea and syncope.   Respiratory: Negative for cough and shortness of breath.    Neurological: Negative for dizziness and light-headedness.   All other systems reviewed and are negative.       Objective:    Physical Exam   Constitutional: He is oriented to person, place, and time. He appears well-developed and well-nourished.   Eyes: Conjunctivae are normal. No scleral icterus.   Neck: No JVD present. No tracheal deviation present.   Cardiovascular: Normal rate and normal heart sounds.  An irregularly irregular rhythm present. PMI is not displaced.    Pulmonary/Chest: Effort normal and breath sounds normal. No respiratory distress.   Abdominal: Soft. There is no hepatosplenomegaly. There is no tenderness.   Musculoskeletal: He exhibits no edema (lower extremity) or tenderness.   Neurological: He is alert and oriented to person, place, and time.   Skin: Skin is warm and dry. No rash noted.   Psychiatric:  He has a normal mood and affect. His behavior is normal.         Assessment:       1. Persistent atrial fibrillation    2. Essential hypertension    3. Diabetes mellitus due to abnormal insulin    4. Cardiomyopathy, nonischemic         Plan:           Persistent atrial fibrillation.  He is asymptomatic.  However, he had depressed EF.  I therefore recommend DCCV.  I indicated that it is not likely he will maintain nsr for extended period given that he has been out of rhythm for an extended period.  He is not in favor of adding many more medications:  Recommend:  Echo to re-establish EF.  NEVAEH/DCCV.  After DCCV, add beta blocker.

## 2018-07-18 ENCOUNTER — HOSPITAL ENCOUNTER (OUTPATIENT)
Dept: CARDIOLOGY | Facility: CLINIC | Age: 73
Discharge: HOME | End: 2018-07-18
Payer: MEDICARE

## 2018-07-18 ENCOUNTER — OFFICE VISIT (OUTPATIENT)
Dept: UROLOGY | Facility: CLINIC | Age: 73
End: 2018-07-18
Payer: MEDICARE

## 2018-07-18 ENCOUNTER — OFFICE VISIT (OUTPATIENT)
Dept: ELECTROPHYSIOLOGY | Facility: CLINIC | Age: 73
End: 2018-07-18
Payer: MEDICARE

## 2018-07-18 VITALS
HEART RATE: 65 BPM | WEIGHT: 201.5 LBS | SYSTOLIC BLOOD PRESSURE: 116 MMHG | DIASTOLIC BLOOD PRESSURE: 65 MMHG | HEIGHT: 68 IN | BODY MASS INDEX: 30.54 KG/M2

## 2018-07-18 VITALS
SYSTOLIC BLOOD PRESSURE: 128 MMHG | HEIGHT: 68 IN | BODY MASS INDEX: 30.58 KG/M2 | WEIGHT: 201.75 LBS | DIASTOLIC BLOOD PRESSURE: 76 MMHG | HEART RATE: 63 BPM

## 2018-07-18 DIAGNOSIS — I48.91 ATRIAL FIBRILLATION, UNSPECIFIED TYPE: ICD-10-CM

## 2018-07-18 DIAGNOSIS — E13.9 DIABETES MELLITUS DUE TO ABNORMAL INSULIN: ICD-10-CM

## 2018-07-18 DIAGNOSIS — Z90.79 HISTORY OF RADICAL PROSTATECTOMY: ICD-10-CM

## 2018-07-18 DIAGNOSIS — C61 PROSTATE CANCER: Primary | ICD-10-CM

## 2018-07-18 DIAGNOSIS — I42.8 CARDIOMYOPATHY, NONISCHEMIC: ICD-10-CM

## 2018-07-18 DIAGNOSIS — I10 ESSENTIAL HYPERTENSION: ICD-10-CM

## 2018-07-18 DIAGNOSIS — I48.19 PERSISTENT ATRIAL FIBRILLATION: Primary | ICD-10-CM

## 2018-07-18 LAB
BILIRUB SERPL-MCNC: ABNORMAL MG/DL
BLOOD URINE, POC: ABNORMAL
COLOR, POC UA: YELLOW
GLUCOSE UR QL STRIP: 250
KETONES UR QL STRIP: ABNORMAL
LEUKOCYTE ESTERASE URINE, POC: ABNORMAL
NITRITE, POC UA: ABNORMAL
PH, POC UA: 5
PROTEIN, POC: ABNORMAL
SPECIFIC GRAVITY, POC UA: 1.01
UROBILINOGEN, POC UA: ABNORMAL

## 2018-07-18 PROCEDURE — 99214 OFFICE O/P EST MOD 30 MIN: CPT | Mod: S$PBB,,, | Performed by: NURSE PRACTITIONER

## 2018-07-18 PROCEDURE — 81002 URINALYSIS NONAUTO W/O SCOPE: CPT | Mod: PBBFAC | Performed by: NURSE PRACTITIONER

## 2018-07-18 PROCEDURE — 99213 OFFICE O/P EST LOW 20 MIN: CPT | Mod: PBBFAC,27,25 | Performed by: INTERNAL MEDICINE

## 2018-07-18 PROCEDURE — 99213 OFFICE O/P EST LOW 20 MIN: CPT | Mod: PBBFAC,25 | Performed by: NURSE PRACTITIONER

## 2018-07-18 PROCEDURE — 93005 ELECTROCARDIOGRAM TRACING: CPT | Mod: PBBFAC | Performed by: INTERNAL MEDICINE

## 2018-07-18 PROCEDURE — 99214 OFFICE O/P EST MOD 30 MIN: CPT | Mod: S$PBB,,, | Performed by: INTERNAL MEDICINE

## 2018-07-18 PROCEDURE — 99999 PR PBB SHADOW E&M-EST. PATIENT-LVL III: CPT | Mod: PBBFAC,,, | Performed by: NURSE PRACTITIONER

## 2018-07-18 PROCEDURE — 93010 ELECTROCARDIOGRAM REPORT: CPT | Mod: S$PBB,,, | Performed by: INTERNAL MEDICINE

## 2018-07-18 PROCEDURE — 99999 PR PBB SHADOW E&M-EST. PATIENT-LVL III: CPT | Mod: PBBFAC,,, | Performed by: INTERNAL MEDICINE

## 2018-07-18 NOTE — PROGRESS NOTES
Subjective:       Patient ID: Erasmo Cherry is a 73 y.o. male.    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   He has currently been living here and being followed in our Urology Clinic for status post prostatectomy.   Last office visit 07/18/2016.    Here today for f/u visit.  He voices no complaints.  He denies any problems with urination unless drinks Frida in the evening.  He reports a good urinary stream and complete bladder emptying.     Wife is passed away 2011 and remained single; not dating.  Ok with ED.    He used the DAVID device successfully in the past.     Denies any abdominal pain or bone pain.            PSA                  <0.01               03/27/2017                 PSA                  <0.01               04/04/2016                 PSA                  <0.01               04/04/2016                 PSA                  <0.01               10/22/2014                 PSA                  <0.01               10/02/2013                       PSA                      <0.010              04/11/2013                 PSA                      <0.010              09/12/2012                 PSA                      <0.010              01/05/2012                 PSA                      <0.01               03/24/2011                 PSA                      <0.01               09/02/2010                 PSA                      <0.01               02/17/2010                    Past Medical History:  No date: Atrial fibrillation  No date: Basal cell carcinoma      Comment: right upper back, left upper arm   No date: Colon polyps      Comment: tubular adenoma, repeat in 2016  No date: Diabetes mellitus  No date: Guillain-[place]      Comment: patient denies history of this condition  No date:  Hypertension  No date: Prostate CA  No date: Prostate cancer    Past Surgical History:  No date: BASAL CELL CARCINOMA EXCISION  4/10/2017: COLONOSCOPY N/A      Comment: Procedure: COLONOSCOPY;  Surgeon: Rubin Solano MD;  Location: [facility] ([address] [place] [address]);  Service:                Endoscopy;  Laterality: N/A;  No date: PROSTATECTOMY  No date: TONSILLECTOMY    Review of patient's family history indicates:  Problem: Diabetes      Relation: Mother       Age of Onset: (Not Specified)   Problem: Heart disease      Relation: Father       Age of Onset: (Not Specified)   Problem: Asthma      Relation: Sister       Age of Onset: (Not Specified)   Problem: Diabetes      Relation: Sister       Age of Onset: (Not Specified)   Problem: Skin cancer      Relation: Neg Hx       Age of Onset: (Not Specified)   Problem: Melanoma      Relation: Neg Hx       Age of Onset: (Not Specified)       Social History    Marital status:              Spouse name:                       Years of education:                 Number of children:               Occupational History  Occupation          Employer            Comment                                   [employer]     Social History Main Topics    Smoking status: Never Smoker                                                                Smokeless tobacco: Never Used                        Alcohol use: Yes                Comment: socially    Drug use: Not on file     Sexual activity: Not on file             Comment: , 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    Medications:  Current Outpatient Prescriptions:   atorvastatin (LIPITOR) 10 MG tablet, TAKE 1 TABLET DAILY, Disp: 90 tablet, Rfl: 0  ELIQUIS 5 mg Tab, TAKE 1 TABLET TWICE A DAY, Disp: 180 tablet, Rfl: 0  FREESTYLE LITE STRIPS Strp, USE TO TEST TWICE A DAY AS NEEDED,  Disp: 200 strip, Rfl: 3  glipiZIDE (GLUCOTROL) 2.5 MG TR24, Take 1 tablet (2.5 mg total) by mouth 3 (three) times daily with meals., Disp: 180 tablet, Rfl: 0  JARDIANCE 25 mg Tab, TAKE 1 TABLET ONCE DAILY, Disp: 90 tablet, Rfl: 0  levothyroxine (SYNTHROID) 50 MCG tablet, Take 1 tablet (50 mcg total) by mouth once daily. Brand only, Disp: 90 tablet, Rfl: 1  lisinopril (PRINIVIL,ZESTRIL) 20 MG tablet, TAKE 1 TABLET DAILY, Disp: 90 tablet, Rfl: 3  metFORMIN (GLUCOPHAGE-XR) 750 MG 24 hr tablet, TAKE 1 TABLET TWICE A DAY WITH MEALS, Disp: 180 tablet, Rfl: 3  VITAMIN D2 50,000 unit capsule, TAKE 1 CAPSULE EVERY 7 DAYS, Disp: 15 capsule, Rfl: 1  vitamin E 800 UNIT capsule, Take 800 Units by mouth once daily., Disp: , Rfl:                         Review of Systems   Constitutional: Negative for activity change, appetite change, chills and fever.   HENT: Negative for facial swelling and trouble swallowing.    Eyes: Negative for visual disturbance.   Respiratory: Negative for chest tightness and shortness of breath.    Cardiovascular: Negative for chest pain and palpitations.   Gastrointestinal: Negative.  Negative for abdominal pain, constipation, diarrhea, nausea and vomiting.   Genitourinary: Negative for difficulty urinating, dysuria, flank pain, hematuria, penile pain, penile swelling, scrotal swelling and testicular pain.        Ok with urination.     Musculoskeletal: Negative for back pain, gait problem, myalgias and neck stiffness.   Skin: Negative for rash.   Neurological: Negative for dizziness and speech difficulty.   Hematological: Does not bruise/bleed easily.   Psychiatric/Behavioral: Negative for behavioral problems.       Objective:      Physical Exam   Nursing note and vitals reviewed.  Constitutional: He is oriented to person, place, and time. Vital signs are normal. He appears well-developed and well-nourished. He is active and cooperative.  Non-toxic appearance. He does not have a sickly appearance.    Urine dipped clear of infection; glucose noted.     HENT:   Head: Normocephalic and atraumatic.   Right Ear: External ear normal.   Left Ear: External ear normal.   Nose: Nose normal.   Mouth/Throat: Mucous membranes are normal.   Eyes: Conjunctivae and lids are normal. No scleral icterus.   Neck: Trachea normal, normal range of motion and full passive range of motion without pain. Neck supple. No JVD present. No tracheal deviation present.   Cardiovascular: Normal rate, S1 normal and S2 normal.    Pulmonary/Chest: Effort normal. No respiratory distress. He exhibits no tenderness.   Abdominal: Soft. Normal appearance and bowel sounds are normal. There is no hepatosplenomegaly. There is no tenderness. There is no CVA tenderness.   Genitourinary: Testes normal and penis normal. Right testis shows no mass, no swelling and no tenderness. Left testis shows no mass, no swelling and no tenderness. Uncircumcised. No phimosis, paraphimosis, hypospadias, penile erythema or penile tenderness. No discharge found.       Musculoskeletal: Normal range of motion.   Lymphadenopathy: No inguinal adenopathy noted on the right or left side.   Neurological: He is alert and oriented to person, place, and time. He has normal strength.   Skin: Skin is warm, dry and intact.     Psychiatric: He has a normal mood and affect. His behavior is normal. Judgment and thought content normal.       Assessment:       1. Prostate cancer    2. History of radical prostatectomy        Plan:         I spent 25 minutes with the patient of which more than half was spent in direct consultation with the patient in regards to our treatment and plan.    Education and recommendations of today's plan of care including home remedies.  We discussed his PSA levels and monitoring.  He almost 10 years out; psa undetectable.  Repeat PSA today.  We discussed diet modifications; contributory factors for LUTS;  We discussed ED; tx when he ready.  RTC one year with PSA  which will be his final one.

## 2018-07-19 NOTE — PROGRESS NOTES
CARDIOVERSION EDUCATION CHECK LIST        DAY OF PROCEDURE     7/25/2018 @ 8 AM   Report to Cardiology Waiting Room on 3rd floor of the hospital  · Do not eat or drink anything after 12 mn on the night before your procedure.  · Please do not wear makeup (especially mascara) when arriving for your procedure.    Medications:  · Hold Glucotrol (glipizide), Metformin (Glucophage), and Jardiance on day of procedure  · You may take your other usual morning medications with a sip of water, including Eliquis    Directions to the Cardiology Waiting Room  If you park in the Parking Garage:  Take elevators to the 2nd floor  Walk up ramp and turn right by Gold Elevators  Take elevator to the 3rd floor  Upon exiting the elevator, turn away from the clinic areas  Walk long ayon around to front of hospital to area with windows overlooking [facility]  Check in at Reception Desk  OR  If family is dropping you off:  Have them drop you off at the front of the Hospital  (Near the ER, where all the flags are hung).  Take the E elevators to the 3rd floor.  Check in at the Reception Desk in the waiting room.        · YOU WILL BE GOING HOME THE SAME DAY AS YOUR PROCEDURE  · YOU WILL NEED SOMEONE TO DRIVE YOU HOME AFTER YOUR PROCEDURE   · YOUR PAIN DURING YOUR PROCEDURE WILL BE MANAGED BY THE ANESTHESIA TEAM    Any need to reschedule or cancel procedures, or any questions regarding your procedure should be addressed directly with the Arrhythmia Department Nurses at the following phone number: [phone]    THE ABOVE INSTRUCTIONS WERE GIVEN TO THE PATIENT VERBALLY AND THEY VERBALIZED UNDERSTANDING. THEY DO NOT REQUIRE ANY SPECIAL NEEDS AND DO NOT HAVE ANY LEARNING BARRIERS.

## 2018-07-24 ENCOUNTER — TELEPHONE (OUTPATIENT)
Dept: ELECTROPHYSIOLOGY | Facility: CLINIC | Age: 73
End: 2018-07-24

## 2018-07-24 NOTE — TELEPHONE ENCOUNTER
Spoke to Mr. Zimmerman    CONFIRMED tomorrow's procedure arrival time of 0800  Reiterated instructions including:  -Directions to check in desk  -NPO after midnight  -High importance of HOLDING glucotrol, metformin, jardiance tomorrow  -Labs to be drawn morning of procedure   -Confirmed compliance of Eliquis with patient      Pt verbalizes understanding of above and appreciates call.

## 2018-07-24 NOTE — PROGRESS NOTES
Patient called about NEVAEH scheduled on 7/25/18. Pre-procedural questions asked. Denies swallowing issues and esophageal issues. Denies previous sedation/anesthesia problems. States does have sleep apnea. Does not have home oxygen. Has a beard. Denies recent trauma/surgery/radiation therapy to head/neck/airway. States is able to move neck without difficulty. NEVAEH described to patient. Instructed NPO past midnight except medications with a small sip of water. Patient instructed will need a designated  as unable to drive or operate machinery for 24 hours post procedure. Instructed to report to SSCU at designated time .Verbalizes understanding. Questions answered.     Height: 68  Weight: 190    Blood thinner: Mellisa

## 2018-07-25 ENCOUNTER — ANESTHESIA (OUTPATIENT)
Dept: MEDSURG UNIT | Facility: HOSPITAL | Age: 73
End: 2018-07-25
Payer: MEDICARE

## 2018-07-25 ENCOUNTER — HOSPITAL ENCOUNTER (OUTPATIENT)
Facility: HOSPITAL | Age: 73
Discharge: HOME | End: 2018-07-25
Attending: INTERNAL MEDICINE | Admitting: INTERNAL MEDICINE
Payer: MEDICARE

## 2018-07-25 ENCOUNTER — ANESTHESIA EVENT (OUTPATIENT)
Dept: MEDSURG UNIT | Facility: HOSPITAL | Age: 73
End: 2018-07-25
Payer: MEDICARE

## 2018-07-25 ENCOUNTER — HOSPITAL ENCOUNTER (OUTPATIENT)
Dept: CARDIOLOGY | Facility: CLINIC | Age: 73
Discharge: HOME | End: 2018-07-25
Attending: INTERNAL MEDICINE | Admitting: INTERNAL MEDICINE
Payer: MEDICARE

## 2018-07-25 VITALS
OXYGEN SATURATION: 97 % | BODY MASS INDEX: 28.79 KG/M2 | TEMPERATURE: 96 F | DIASTOLIC BLOOD PRESSURE: 80 MMHG | WEIGHT: 190 LBS | SYSTOLIC BLOOD PRESSURE: 125 MMHG | HEIGHT: 68 IN | RESPIRATION RATE: 18 BRPM | HEART RATE: 70 BPM

## 2018-07-25 DIAGNOSIS — I48.19 PERSISTENT ATRIAL FIBRILLATION: Primary | ICD-10-CM

## 2018-07-25 DIAGNOSIS — I48.91 AF (ATRIAL FIBRILLATION): ICD-10-CM

## 2018-07-25 LAB
ANION GAP SERPL CALC-SCNC: 11 MMOL/L
AORTIC VALVE REGURGITATION: ABNORMAL
APTT BLDCRRT: 24.9 SEC
BASOPHILS # BLD AUTO: 0.06 K/UL
BASOPHILS NFR BLD: 0.7 %
BUN SERPL-MCNC: 22 MG/DL
CALCIUM SERPL-MCNC: 10 MG/DL
CHLORIDE SERPL-SCNC: 105 MMOL/L
CO2 SERPL-SCNC: 23 MMOL/L
CREAT SERPL-MCNC: 1 MG/DL
DIFFERENTIAL METHOD: NORMAL
EOSINOPHIL # BLD AUTO: 0.3 K/UL
EOSINOPHIL NFR BLD: 3.2 %
ERYTHROCYTE [DISTWIDTH] IN BLOOD BY AUTOMATED COUNT: 12.6 %
EST. GFR  (AFRICAN AMERICAN): >60 ML/MIN/1.73 M^2
EST. GFR  (NON AFRICAN AMERICAN): >60 ML/MIN/1.73 M^2
ESTIMATED PA SYSTOLIC PRESSURE: 29.59
GLUCOSE SERPL-MCNC: 206 MG/DL
HCT VFR BLD AUTO: 48 %
HGB BLD-MCNC: 16.8 G/DL
IMM GRANULOCYTES # BLD AUTO: 0.02 K/UL
IMM GRANULOCYTES NFR BLD AUTO: 0.2 %
INR PPP: 1
LYMPHOCYTES # BLD AUTO: 2.9 K/UL
LYMPHOCYTES NFR BLD: 33.5 %
MCH RBC QN AUTO: 30.7 PG
MCHC RBC AUTO-ENTMCNC: 35 G/DL
MCV RBC AUTO: 88 FL
MITRAL VALVE MOBILITY: NORMAL
MITRAL VALVE REGURGITATION: ABNORMAL
MONOCYTES # BLD AUTO: 0.8 K/UL
MONOCYTES NFR BLD: 8.8 %
NEUTROPHILS # BLD AUTO: 4.6 K/UL
NEUTROPHILS NFR BLD: 53.6 %
NRBC BLD-RTO: 0 /100 WBC
PLATELET # BLD AUTO: 165 K/UL
PMV BLD AUTO: 10.4 FL
POCT GLUCOSE: 159 MG/DL (ref 70–110)
POCT GLUCOSE: 223 MG/DL (ref 70–110)
POTASSIUM SERPL-SCNC: 4.8 MMOL/L
PROTHROMBIN TIME: 10.6 SEC
RBC # BLD AUTO: 5.47 M/UL
RETIRED EF AND QEF - SEE NOTES: 45 (ref 55–65)
SODIUM SERPL-SCNC: 139 MMOL/L
TRICUSPID VALVE REGURGITATION: ABNORMAL
WBC # BLD AUTO: 8.57 K/UL

## 2018-07-25 PROCEDURE — 85025 COMPLETE CBC W/AUTO DIFF WBC: CPT

## 2018-07-25 PROCEDURE — 80048 BASIC METABOLIC PNL TOTAL CA: CPT

## 2018-07-25 PROCEDURE — 37000009 HC ANESTHESIA EA ADD 15 MINS: Performed by: INTERNAL MEDICINE

## 2018-07-25 PROCEDURE — 92960 CARDIOVERSION ELECTRIC EXT: CPT

## 2018-07-25 PROCEDURE — C8925 2D TEE W OR W/O FOL W/CON,IN: HCPCS

## 2018-07-25 PROCEDURE — 25000003 PHARM REV CODE 250: Performed by: NURSE PRACTITIONER

## 2018-07-25 PROCEDURE — 85730 THROMBOPLASTIN TIME PARTIAL: CPT

## 2018-07-25 PROCEDURE — 93010 ELECTROCARDIOGRAM REPORT: CPT | Mod: 76,,, | Performed by: INTERNAL MEDICINE

## 2018-07-25 PROCEDURE — 93320 DOPPLER ECHO COMPLETE: CPT | Mod: 26,,, | Performed by: INTERNAL MEDICINE

## 2018-07-25 PROCEDURE — 25000003 PHARM REV CODE 250: Performed by: NURSE ANESTHETIST, CERTIFIED REGISTERED

## 2018-07-25 PROCEDURE — 25000003 PHARM REV CODE 250

## 2018-07-25 PROCEDURE — 92960 CARDIOVERSION ELECTRIC EXT: CPT | Mod: ,,, | Performed by: INTERNAL MEDICINE

## 2018-07-25 PROCEDURE — 85610 PROTHROMBIN TIME: CPT

## 2018-07-25 PROCEDURE — D9220A PRA ANESTHESIA: Mod: ANES,,, | Performed by: ANESTHESIOLOGY

## 2018-07-25 PROCEDURE — 93312 ECHO TRANSESOPHAGEAL: CPT | Mod: 26,,, | Performed by: INTERNAL MEDICINE

## 2018-07-25 PROCEDURE — 93005 ELECTROCARDIOGRAM TRACING: CPT | Mod: 59

## 2018-07-25 PROCEDURE — 93010 ELECTROCARDIOGRAM REPORT: CPT | Mod: ,,, | Performed by: INTERNAL MEDICINE

## 2018-07-25 PROCEDURE — 63600175 PHARM REV CODE 636 W HCPCS: Performed by: NURSE ANESTHETIST, CERTIFIED REGISTERED

## 2018-07-25 PROCEDURE — 27100006 CARDIOVERSION (DCCV)

## 2018-07-25 PROCEDURE — 82962 GLUCOSE BLOOD TEST: CPT | Mod: 91

## 2018-07-25 PROCEDURE — 82962 GLUCOSE BLOOD TEST: CPT | Performed by: INTERNAL MEDICINE

## 2018-07-25 PROCEDURE — 93325 DOPPLER ECHO COLOR FLOW MAPG: CPT | Mod: 26,,, | Performed by: INTERNAL MEDICINE

## 2018-07-25 PROCEDURE — 37000008 HC ANESTHESIA 1ST 15 MINUTES: Performed by: INTERNAL MEDICINE

## 2018-07-25 PROCEDURE — D9220A PRA ANESTHESIA: Mod: CRNA,,, | Performed by: NURSE ANESTHETIST, CERTIFIED REGISTERED

## 2018-07-25 RX ORDER — SODIUM CHLORIDE 9 MG/ML
INJECTION, SOLUTION INTRAVENOUS CONTINUOUS
Status: DISCONTINUED | OUTPATIENT
Start: 2018-07-25 | End: 2018-07-25

## 2018-07-25 RX ORDER — METOPROLOL SUCCINATE 25 MG/1
TABLET, EXTENDED RELEASE ORAL
Qty: 30 TABLET | Refills: 0 | Status: SHIPPED | OUTPATIENT
Start: 2018-07-25 | End: 2018-07-25

## 2018-07-25 RX ORDER — SODIUM CHLORIDE 0.9 % (FLUSH) 0.9 %
3 SYRINGE (ML) INJECTION
Status: DISCONTINUED | OUTPATIENT
Start: 2018-07-25 | End: 2018-07-25

## 2018-07-25 RX ORDER — PROPOFOL 10 MG/ML
VIAL (ML) INTRAVENOUS CONTINUOUS PRN
Status: DISCONTINUED | OUTPATIENT
Start: 2018-07-25 | End: 2018-07-25

## 2018-07-25 RX ORDER — LIDOCAINE HYDROCHLORIDE 10 MG/ML
INJECTION, SOLUTION INTRAVENOUS
Status: DISCONTINUED | OUTPATIENT
Start: 2018-07-25 | End: 2018-07-25

## 2018-07-25 RX ORDER — PROPOFOL 10 MG/ML
VIAL (ML) INTRAVENOUS
Status: DISCONTINUED | OUTPATIENT
Start: 2018-07-25 | End: 2018-07-25

## 2018-07-25 RX ORDER — SODIUM CHLORIDE 9 MG/ML
INJECTION, SOLUTION INTRAVENOUS CONTINUOUS PRN
Status: DISCONTINUED | OUTPATIENT
Start: 2018-07-25 | End: 2018-07-25

## 2018-07-25 RX ORDER — METOPROLOL SUCCINATE 25 MG/1
25 TABLET, EXTENDED RELEASE ORAL DAILY
Qty: 30 TABLET | Refills: 11 | Status: SHIPPED | OUTPATIENT
Start: 2018-07-25 | End: 2019-08-15 | Stop reason: SDUPTHER

## 2018-07-25 RX ORDER — PHENYLEPHRINE HYDROCHLORIDE 10 MG/ML
INJECTION INTRAVENOUS
Status: DISCONTINUED | OUTPATIENT
Start: 2018-07-25 | End: 2018-07-25

## 2018-07-25 RX ORDER — FENTANYL CITRATE 50 UG/ML
25 INJECTION, SOLUTION INTRAMUSCULAR; INTRAVENOUS EVERY 5 MIN PRN
Status: DISCONTINUED | OUTPATIENT
Start: 2018-07-25 | End: 2018-07-25

## 2018-07-25 RX ADMIN — TOPICAL ANESTHETIC 1 EACH: 200 SPRAY DENTAL; PERIODONTAL at 10:07

## 2018-07-25 RX ADMIN — PROPOFOL 150 MCG/KG/MIN: 10 INJECTION, EMULSION INTRAVENOUS at 10:07

## 2018-07-25 RX ADMIN — PHENYLEPHRINE HYDROCHLORIDE 200 MCG: 10 INJECTION INTRAVENOUS at 10:07

## 2018-07-25 RX ADMIN — SODIUM CHLORIDE: 0.9 INJECTION, SOLUTION INTRAVENOUS at 09:07

## 2018-07-25 RX ADMIN — PROPOFOL 60 MG: 10 INJECTION, EMULSION INTRAVENOUS at 10:07

## 2018-07-25 RX ADMIN — PHENYLEPHRINE HYDROCHLORIDE 100 MCG: 10 INJECTION INTRAVENOUS at 10:07

## 2018-07-25 RX ADMIN — PROPOFOL 20 MG: 10 INJECTION, EMULSION INTRAVENOUS at 10:07

## 2018-07-25 RX ADMIN — SODIUM CHLORIDE 1000 ML: 0.9 INJECTION, SOLUTION INTRAVENOUS at 07:07

## 2018-07-25 RX ADMIN — LIDOCAINE HYDROCHLORIDE 100 MG: 10 INJECTION, SOLUTION INTRAVENOUS at 10:07

## 2018-07-25 NOTE — PLAN OF CARE
Vss. 1st degree av block noted, sr. Hr 78.  Pt denies pain. Tolerating po intake in ep pacu. 12 lead ekg done per md order.  See flowsheet for full assessment. Voicemail left for pt's friend ms. Zamora.  md updated in waiting room. Once released from anesthesia, to transfer back to sscu.

## 2018-07-25 NOTE — HPI
Erasmo Hope is a 74 yo male with past medical hx of HTN, DM, AFib (currently anticoagulated with Eliquis), NICM (EF of 40-45%) here for outpatient NEVAEH/DCCV. NEVAEH requested to re-establish EF and r/o EDGAR/LA thrombus prior to DCCV. Echo 1/19/17 EF 40-45% GENARO 46.89. Of note, he has been largely asymptomatic with his Afib. No prior hx of CVA. Took his eliquis this morning.

## 2018-07-25 NOTE — DISCHARGE INSTRUCTIONS
Medications:  -Take your medications as listed on your medication list after you  are discharged.  -If you have problems or side effects caused by your medications, call your  Physician.    New Medications:  Metoprolol (Toprol XL) 25mg tablet. Take 1/2 a tablet (12.5mg) by mouth daily for 1 week (7 days). Then, increase to 1 tablet (25mg) daily.     Side effects:  -You may be drowsy for the remainder of the day.    Diet  -You may resume oral intake 2 hours after you are discharged, as long you  have no swallowing difficulties.    Because you have received sedation for this procedure:  -Limit activity for the remainder of the day.  -Do not drive or operate any equipment for the remainder of the day.  -Do not smoke for at least 6 hours and until you are fully awake and alert.  -Do not drink alcoholic beverage for 24 hours.  -Defer important decision making until the following day.    Go to the Emergency Department if you develop:  -Bleeding  -Weakness or numbness  -Visual, gait or speech disturbance  -New chest pain, palpitations, shortness of breath, or fainting  -Fever    Follow up:  -EKG in 1 week   -Dr. Harvey Blake in 6 weeks.

## 2018-07-25 NOTE — ANESTHESIA PREPROCEDURE EVALUATION
07/25/2018  Erasmo Cherry is a 73 y.o., male.  Pre-operative evaluation for Procedure(s) (LRB):  CARDIOVERSION (N/A)  ECHOCARDIOGRAM,TRANSESOPHAGEAL (N/A)    Erasmo Cherry is a 73 y.o. male     Patient Active Problem List   Diagnosis    History of prostatectomy    Essential hypertension    Prostate CA    Colon polyps    Basal cell carcinoma    Abnormal liver function    Nonalcoholic fatty liver disease    Diabetes mellitus due to abnormal insulin    Elevated LFTs    Persistent atrial fibrillation    Hyperlipidemia    Cardiomyopathy, nonischemic    AF (atrial fibrillation)       Review of patient's allergies indicates:  No Known Allergies    No current facility-administered medications on file prior to encounter.      Current Outpatient Prescriptions on File Prior to Encounter   Medication Sig Dispense Refill    atorvastatin (LIPITOR) 10 MG tablet TAKE 1 TABLET DAILY 90 tablet 0    ELIQUIS 5 mg Tab TAKE 1 TABLET TWICE A  tablet 0    glipiZIDE (GLUCOTROL) 2.5 MG TR24 Take 1 tablet (2.5 mg total) by mouth 3 (three) times daily with meals. 180 tablet 0    JARDIANCE 25 mg Tab TAKE 1 TABLET ONCE DAILY 90 tablet 0    levothyroxine (SYNTHROID) 50 MCG tablet Take 1 tablet (50 mcg total) by mouth once daily. Brand only 90 tablet 1    lisinopril (PRINIVIL,ZESTRIL) 20 MG tablet TAKE 1 TABLET DAILY 90 tablet 3    metFORMIN (GLUCOPHAGE-XR) 750 MG 24 hr tablet TAKE 1 TABLET TWICE A DAY WITH MEALS 180 tablet 3    vitamin E 800 UNIT capsule Take 800 Units by mouth once daily.      FREESTYLE LITE STRIPS Strp USE TO TEST TWICE A DAY AS NEEDED 200 strip 3    VITAMIN D2 50,000 unit capsule TAKE 1 CAPSULE EVERY 7 DAYS 15 capsule 1    [DISCONTINUED] lancets (ONE TOUCH ULTRASOFT LANCETS) Misc 1 each by Misc.(Non-Drug; Combo Route) route 2 (two) times daily as needed. 100 each 2       Past  "Surgical History:   Procedure Laterality Date    BASAL CELL CARCINOMA EXCISION      COLONOSCOPY N/A 4/10/2017    Procedure: COLONOSCOPY;  Surgeon: Rubin Solano MD;  Location: [address]);  Service: Endoscopy;  Laterality: N/A;    PROSTATECTOMY      TONSILLECTOMY         Social History     Social History    Marital status:      Spouse name: N/A    Number of children: N/A    Years of education: N/A     Occupational History     [employer]      Social History Main Topics    Smoking status: Never Smoker    Smokeless tobacco: Never Used    Alcohol use Yes      Comment: socially    Drug use: Unknown    Sexual activity: Not on file      Comment: , 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    INR  Recent Labs      18   0732   INR  1.0   APTT  24.9           Diagnostic Studies:      EKD Echo:  Results for orders placed or performed during the hospital encounter of 17   2D echo with color flow doppler   Result Value Ref Range    EF 40 (A) 55 - 65    Mitral Valve Regurgitation TRIVIAL     Diastolic Dysfunction Yes (A)     Aortic Valve Regurgitation TRIVIAL     Est. PA Systolic Pressure 33.4     Mitral Valve Mobility SYSTOLIC FLATTENING     Tricuspid Valve Regurgitation MILD      Last 3 sets of Vitals    Vitals - 1 value per visit 2018   SYSTOLIC 128 116 149   DIASTOLIC 76 65 95   PULSE 63 65 62   TEMPERATURE - - 98.1   RESPIRATIONS - - 18   SPO2 - - 97   Weight (lb) 201.72 201.5 190   Weight (kg) 91.5 91.4 86.183   HEIGHT 5' 8" 5' 8" 5' 8"   BODY MASS INDEX " 30.67 30.64 28.89   VISIT REPORT - - -   Pain Score  0 - -   Some recent data might be hidden       Pre-op Assessment    I have reviewed the Patient Summary Reports.     I have reviewed the Nursing Notes.   I have reviewed the Medications.     Review of Systems  Anesthesia Hx:  No problems with previous Anesthesia  History of prior surgery of interest to airway management or planning: Previous anesthesia: General Denies Family Hx of Anesthesia complications.   Denies Personal Hx of Anesthesia complications.   Social:  Non-Smoker    Hematology/Oncology:  Hematology Normal       -- Cancer in past history:  surgery  Oncology Comments: Prostate CA; basal cell carcinoma     EENT/Dental:EENT/Dental Normal   Cardiovascular:   Exercise tolerance: good Hypertension Dysrhythmias atrial fibrillation    Pulmonary:   Sleep Apnea, CPAP    Renal/:   Hx prostate CA, s/p prostatectomy   Hepatic/GI:   Liver Disease, Fatty liver   Musculoskeletal:  Musculoskeletal Normal    Neurological:   Guillain-[place]   Endocrine:   Diabetes, type 2    Dermatological:  Skin Normal    Psych:  Psychiatric Normal           Physical Exam  General:  Well nourished    Airway/Jaw/Neck:  Airway Findings: Mouth Opening: Normal Tongue: Normal  General Airway Assessment: Adult, Average  Mallampati: II  Improves to II with phonation.  TM Distance: Normal, at least 6 cm        Eyes/Ears/Nose:  EYES/EARS/NOSE FINDINGS: Normal   Dental:  Dental Findings: In tact   Chest/Lungs:  Chest/Lungs Findings: Clear to auscultation, Normal Respiratory Rate     Heart/Vascular:  Heart Findings: Rate: Normal  Rhythm: Regular Rhythm  Sounds: Normal  Heart murmur: negative Vascular Findings: Normal    Abdomen:  Abdomen Findings: Normal    Musculoskeletal:  Musculoskeletal Findings: Normal   Skin:  Skin Findings: Normal    Mental Status:  Mental Status Findings:  Cooperative, Alert and Oriented         Anesthesia Plan  Type of Anesthesia, risks & benefits discussed:  Anesthesia  Type:  general  Patient's Preference:   Intra-op Monitoring Plan:   Intra-op Monitoring Plan Comments:   Post Op Pain Control Plan:   Post Op Pain Control Plan Comments:   Induction:   IV  Beta Blocker:  Patient is not currently on a Beta-Blocker (No further documentation required).       Informed Consent: Patient understands risks and agrees with Anesthesia plan.  Questions answered. Anesthesia consent signed with patient.  ASA Score: 2     Day of Surgery Review of History & Physical:            Ready For Surgery From Anesthesia Perspective.

## 2018-07-25 NOTE — TRANSFER OF CARE
"Anesthesia Transfer of Care Note    Patient: Erasmo Cherry    Procedure(s) Performed: Procedure(s) (LRB):  CARDIOVERSION (N/A)  ECHOCARDIOGRAM,TRANSESOPHAGEAL (N/A)    Patient location: PACU    Anesthesia Type: general    Transport from OR: Transported from OR on room air with adequate spontaneous ventilation. Transported from OR on 6-10 L/min O2 by face mask with adequate spontaneous ventilation    Post pain: adequate analgesia    Post assessment: no apparent anesthetic complications and tolerated procedure well    Post vital signs: stable    Level of consciousness: awake and alert    Nausea/Vomiting: no nausea/vomiting    Complications: none    Transfer of care protocol was followed      Last vitals:   Visit Vitals  BP (!) 94/59 (BP Location: Right arm, Patient Position: Lying)   Pulse 75   Temp 36.7 °C (98.1 °F) (Temporal)   Resp 15   Ht 5' 8" (1.727 m)   Wt 86.2 kg (190 lb)   SpO2 (!) 93%   BMI 28.89 kg/m²     "

## 2018-07-25 NOTE — ANESTHESIA POSTPROCEDURE EVALUATION
"Anesthesia Post Evaluation    Patient: Erasmo Cherry    Procedure(s) Performed: Procedure(s) (LRB):  CARDIOVERSION (N/A)  ECHOCARDIOGRAM,TRANSESOPHAGEAL (N/A)    Final Anesthesia Type: general  Patient location during evaluation: Cath Lab  Patient participation: Yes- Able to Participate  Level of consciousness: awake and alert  Post-procedure vital signs: reviewed and stable  Pain management: adequate  Airway patency: patent  PONV status at discharge: No PONV  Anesthetic complications: no      Cardiovascular status: blood pressure returned to baseline  Respiratory status: unassisted, spontaneous ventilation and room air  Hydration status: euvolemic          Visit Vitals  /80 (BP Location: Left arm, Patient Position: Lying)   Pulse 70   Temp 35.7 °C (96.2 °F) (Oral)   Resp 18   Ht 5' 8" (1.727 m)   Wt 86.2 kg (190 lb)   SpO2 97%   BMI 28.89 kg/m²       Pain/April Score: Pain Assessment Performed: Yes (7/25/2018 11:47 AM)  Presence of Pain: denies (7/25/2018 11:47 AM)  Pain Rating Prior to Med Admin: 0 (7/25/2018 11:14 AM)  April Score: 9 (7/25/2018 11:27 AM)      "

## 2018-07-25 NOTE — HOSPITAL COURSE
Presented in atrial fibrillation. NEVAEH negative for thrombus. On eliquis. DCCV x1@200J with conversion to NSR. Tolerated procedure well. Discharged home in stable condition. Started on Toprol XL 25mg tablet, 1/2 a tablet for 1 week. Then, increase to 1 tablet (25mg) daily. EKG in 1 week. Follow up Dr. Harvey Blake in 6 weeks.

## 2018-07-25 NOTE — H&P
TRANSESOPHAGEAL ECHOCARDIOGRAPHY   PRE-PROCEDURE NOTE    2018    HPI:     Erasmo Hope is a 74 yo male with past medical hx of HTN, DM, AFib (currently anticoagulated with Eliquis), NICM (EF of 40-45%) here for outpatient NEVAEH/DCCV. NEVAEH requested to re-establish EF and r/o EDGAR/LA thrombus prior to DCCV. Echo 17 EF 40-45% GENARO 46.89. Of note, he has been largely asymptomatic with his Afib. No prior hx of CVA. Took his eliquis this morning.      Dysphagia or odynophagia:  No  Liver Disease, esophageal disease, or known varices:  No  Upper GI Bleeding: No  Snoring:  Yes  Sleep Apnea:  Yes  Prior neck surgery or radiation:  No  History of anesthetic difficulties:  No  Family history of anesthetic difficulties:  No  Last oral intake:  12 hours ago  Able to move neck in all directions:  Yes      Meds:     Scheduled Meds:  PRN Meds:  Continuous Infusions:   sodium chloride 0.9%         Physical Exam:     Vitals:          Constitutional: NAD, conversant  HEENT:   Sclera anicteric, Uvula midline, EOMI, OP clear  Neck:               No JVD, moves to all direction without any limitations  CV:               Irregularly irregular, no murmurs / rubs / gallops, normal S1/S2  Pulm:               CTAB, no wheezes, rales, or ronchi  GI:               Abdomen soft, NTND, +BS  Extremities:              No LE edema, warm with palpable pulses  Neuro:   AAOX3, no focal motor deficits    Mallampati:  4  ASA: 2      Labs:     No results found for this or any previous visit (from the past 336 hour(s)).    No results found for this or any previous visit (from the past 336 hour(s)).    CrCl cannot be calculated (Patient's most recent lab result is older than the maximum 7 days allowed.).      Imaging:  described above    EK2018  Afib with RBBB          Assessment & Plan:     PLAN:  1. NEVAEH for evaluation of LA/EDGAR thrombus prior to DCCV    -The risks, benefits & alternatives of the procedure were explained to the  patient.    -The risks of transesophageal echo include but are not limited to:  Dental trauma, esophageal trauma/perforation, bleeding, laryngospasm/brochospasm, aspiration, sore throat/hoarseness, & dislodgement of the endotracheal tube/nasogastric tube (where applicable).    -The risks of moderate sedation include hypotension, respiratory depression, arrhythmias, bronchospasm, & death.    -Informed consent was obtained & the patient is agreeable to proceed with the procedure.    I will discuss with the attending physician. Attending addendum is to follow.     Further recommendations per attending addendum    Anirudh Adhikari MD, PGY-5  Cardiology Fellow

## 2018-07-25 NOTE — PLAN OF CARE
Problem: Patient Care Overview  Goal: Plan of Care Review  Outcome: Ongoing (interventions implemented as appropriate)  Pt arrived to unit without distress.  Vss. Oriented pt to rm and unit.  Pre op orders and assessment initiated.  Pt remains npo.  Pt in no acute distress and verbalizes no complaints. Will continue to monitor. Call bell within reach.

## 2018-07-25 NOTE — DISCHARGE SUMMARY
[facility]  Cardiac Electrophysiology  Discharge Summary      Patient Name: Erasmo Cherry  MRN: [id number]  Admission Date: 7/25/2018  Hospital Length of Stay: 0 days  Discharge Date and Time:  07/25/2018 11:09 AM  Attending Physician: Harvey Blake MD    Discharging Provider: Jaswinder Stoner MD  Primary Care Physician: Chichi Stack MD    HPI:   Erasmo Hope is a 72 yo male with past medical hx of HTN, DM, AFib (currently anticoagulated with Eliquis), NICM (EF of 40-45%) here for outpatient NEVAEH/DCCV. NEVAEH requested to re-establish EF and r/o EDGAR/LA thrombus prior to DCCV. Echo 1/19/17 EF 40-45% GENARO 46.89. Of note, he has been largely asymptomatic with his Afib. No prior hx of CVA. Took his eliquis this morning.       Procedure(s) (LRB):  CARDIOVERSION (N/A)  ECHOCARDIOGRAM,TRANSESOPHAGEAL (N/A)     Indwelling Lines/Drains at time of discharge:  Lines/Drains/Airways          No matching active lines, drains, or airways          Hospital Course:  Presented in atrial fibrillation. NEVAEH negative for thrombus. On eliquis. DCCV x1@200J with conversion to NSR. Tolerated procedure well. Discharged home in stable condition. Started on Toprol XL 25mg tablet, 1/2 a tablet for 1 week. Then, increase to 1 tablet (25mg) daily. EKG in 1 week. Follow up Dr. Harvey Blake in 6 weeks.     Consults:     Significant Diagnostic Studies: Labs:   CMP     Recent Labs  Lab 07/25/18  0732      K 4.8      CO2 23   *   BUN 22   CREATININE 1.0   CALCIUM 10.0   ANIONGAP 11   ESTGFRAFRICA >60.0   EGFRNONAA >60.0   , CBC     Recent Labs  Lab 07/25/18  0732   WBC 8.57   HGB 16.8   HCT 48.0       and INR   Lab Results   Component Value Date    INR 1.0 07/25/2018    INR 1.0 05/11/2016    INR 1.0 02/07/2013     Radiology: X-Ray: CXR: X-Ray Chest 1 View (CXR): No results found for this visit on 07/25/18.  Cardiac Graphics: ECG: atrial fibrillation --> NSR and Echocardiogram:   2D echo with color flow  doppler:   Results for orders placed or performed during the hospital encounter of 01/19/17   2D echo with color flow doppler   Result Value Ref Range    EF 40 (A) 55 - 65    Mitral Valve Regurgitation TRIVIAL     Diastolic Dysfunction Yes (A)     Aortic Valve Regurgitation TRIVIAL     Est. PA Systolic Pressure 33.4     Mitral Valve Mobility SYSTOLIC FLATTENING     Tricuspid Valve Regurgitation MILD        Pending Diagnostic Studies:     Procedure Component Value Units Date/Time    EKG 12-LEAD [[id number]]     Order Status:  Sent Lab Status:  No result           Final Active Diagnoses:    Diagnosis Date Noted POA    PRINCIPAL PROBLEM:  AF (atrial fibrillation) [I48.91] 07/25/2018 Yes      Problems Resolved During this Admission:    Diagnosis Date Noted Date Resolved POA     No new Assessment & Plan notes have been filed under this hospital service since the last note was generated.  Service: Arrhythmia      Discharged Condition: good    Disposition:     Follow Up:  Follow-up Information     Harvey Blake MD In 6 weeks.    Specialties:  Electrophysiology, Cardiology  Contact information:  [address] [place] [id number]  [phone]                 Patient Instructions:     EKG 12-lead   Standing Status: Future  Standing Exp. Date: 07/25/19   Order Specific Question Answer Comments   Diagnosis Atrial fibrillation [427.31.ICD-9-CM]        Medications:  Reconciled Home Medications:      Medication List      START taking these medications    metoprolol succinate 25 MG 24 hr tablet  Commonly known as:  TOPROL-XL  Take 1 tablet (25 mg total) by mouth once daily.        CONTINUE taking these medications    atorvastatin 10 MG tablet  Commonly known as:  LIPITOR  TAKE 1 TABLET DAILY     ELIQUIS 5 mg Tab  Generic drug:  apixaban  TAKE 1 TABLET TWICE A DAY     FREESTYLE LITE STRIPS Strp  Generic drug:  blood sugar diagnostic  USE TO TEST TWICE A DAY AS NEEDED     glipiZIDE 2.5 MG Tr24  Commonly known as:   GLUCOTROL  Take 1 tablet (2.5 mg total) by mouth 3 (three) times daily with meals.     JARDIANCE 25 mg Tab  Generic drug:  empagliflozin  TAKE 1 TABLET ONCE DAILY     levothyroxine 50 MCG tablet  Commonly known as:  SYNTHROID  Take 1 tablet (50 mcg total) by mouth once daily. Brand only     lisinopril 20 MG tablet  Commonly known as:  PRINIVIL,ZESTRIL  TAKE 1 TABLET DAILY     metFORMIN 750 MG 24 hr tablet  Commonly known as:  GLUCOPHAGE-XR  TAKE 1 TABLET TWICE A DAY WITH MEALS     VITAMIN D2 50,000 unit Cap  Generic drug:  ergocalciferol  TAKE 1 CAPSULE EVERY 7 DAYS     vitamin E 800 UNIT capsule  Take 800 Units by mouth once daily.            Time spent on the discharge of patient: 5 minutes  - EKG in 1 week  - Follow up with Dr. Blake in 6 weeks    Jaswinder Stoner MD  Cardiac Electrophysiology  [facility]

## 2018-07-25 NOTE — NURSING
Pt d/c'd to home per md orders.  Vss.  piv removed intact and without difficulty.  Instructed pt on home medications, post procedure precautions and follow up visits.  Pt verbalizes understanding.  Pt in no acute distress and verbalizes no complaints.

## 2018-07-25 NOTE — PLAN OF CARE
Problem: Patient Care Overview  Goal: Plan of Care Review  Outcome: Ongoing (interventions implemented as appropriate)  Pt transferred from recovery.  Vss. Post op orders and assessment initiated.  Pt aao, tolerating po. Pt in no acute distress and verbalizes no complaints.  Call bell within reach.

## 2018-07-25 NOTE — NURSING TRANSFER
Nursing Transfer Note      7/25/2018     Transfer To: ep pacu 2 to sscu 6  Transfer via stretcher    Transfer with none    Transported by karon martínez    Medicines sent: none    Chart send with patient: Yes    Notified: friend    Patient reassessed at: 7/25/18 1130    Upon arrival to floor: patient oriented to room, call bell in reach and bed in lowest position

## 2018-07-30 ENCOUNTER — TELEPHONE (OUTPATIENT)
Dept: ELECTROPHYSIOLOGY | Facility: CLINIC | Age: 73
End: 2018-07-30

## 2018-07-30 NOTE — TELEPHONE ENCOUNTER
----- Message from John Durbin MA sent at 7/30/2018 12:38 PM CDT -----  Contact: patient called  Wednesday post op please see below  ----- Message -----  From: Cherie Arnold MA  Sent: 7/30/2018  12:36 PM  To: Hayden Gabriel Staff    The patient need an EKG and a f/u visit with . Please call the at [phone]. Thank you.

## 2018-07-30 NOTE — TELEPHONE ENCOUNTER
Returned patient call. Appointments scheduled Post DCCV with patient for follow-up . and mailed to patient.

## 2018-08-02 ENCOUNTER — HOSPITAL ENCOUNTER (OUTPATIENT)
Dept: CARDIOLOGY | Facility: CLINIC | Age: 73
Discharge: HOME | End: 2018-08-02
Payer: MEDICARE

## 2018-08-02 DIAGNOSIS — I48.91 ATRIAL FIBRILLATION, UNSPECIFIED TYPE: ICD-10-CM

## 2018-08-02 PROCEDURE — 93010 ELECTROCARDIOGRAM REPORT: CPT | Mod: S$PBB,,, | Performed by: INTERNAL MEDICINE

## 2018-08-02 PROCEDURE — 93005 ELECTROCARDIOGRAM TRACING: CPT | Mod: PBBFAC | Performed by: INTERNAL MEDICINE

## 2018-09-05 ENCOUNTER — OFFICE VISIT (OUTPATIENT)
Dept: ELECTROPHYSIOLOGY | Facility: CLINIC | Age: 73
End: 2018-09-05
Payer: MEDICARE

## 2018-09-05 ENCOUNTER — HOSPITAL ENCOUNTER (OUTPATIENT)
Dept: CARDIOLOGY | Facility: CLINIC | Age: 73
Discharge: HOME | End: 2018-09-05
Payer: MEDICARE

## 2018-09-05 VITALS
DIASTOLIC BLOOD PRESSURE: 66 MMHG | HEIGHT: 68 IN | SYSTOLIC BLOOD PRESSURE: 122 MMHG | HEART RATE: 51 BPM | BODY MASS INDEX: 30.74 KG/M2 | WEIGHT: 202.81 LBS

## 2018-09-05 DIAGNOSIS — I48.91 ATRIAL FIBRILLATION, UNSPECIFIED TYPE: ICD-10-CM

## 2018-09-05 DIAGNOSIS — E13.9 DIABETES MELLITUS DUE TO ABNORMAL INSULIN: ICD-10-CM

## 2018-09-05 DIAGNOSIS — I45.10 RBBB: ICD-10-CM

## 2018-09-05 DIAGNOSIS — I48.19 PERSISTENT ATRIAL FIBRILLATION: Primary | ICD-10-CM

## 2018-09-05 DIAGNOSIS — I42.8 CARDIOMYOPATHY, NONISCHEMIC: ICD-10-CM

## 2018-09-05 DIAGNOSIS — I10 ESSENTIAL HYPERTENSION: ICD-10-CM

## 2018-09-05 PROCEDURE — 93005 ELECTROCARDIOGRAM TRACING: CPT | Mod: PBBFAC | Performed by: INTERNAL MEDICINE

## 2018-09-05 PROCEDURE — 93010 ELECTROCARDIOGRAM REPORT: CPT | Mod: S$PBB,,, | Performed by: INTERNAL MEDICINE

## 2018-09-05 PROCEDURE — 99999 PR PBB SHADOW E&M-EST. PATIENT-LVL III: CPT | Mod: PBBFAC,,, | Performed by: INTERNAL MEDICINE

## 2018-09-05 PROCEDURE — 99213 OFFICE O/P EST LOW 20 MIN: CPT | Mod: PBBFAC | Performed by: INTERNAL MEDICINE

## 2018-09-05 PROCEDURE — 99214 OFFICE O/P EST MOD 30 MIN: CPT | Mod: S$PBB,,, | Performed by: INTERNAL MEDICINE

## 2018-09-05 NOTE — PROGRESS NOTES
Subjective:    Patient ID:  Erasmo Cherry is a 73 y.o. male who presents for follow-up of Persistent atrial fibrillation      HPI 72 yo male with Htn, DM, atrial fibrillation, colon polyps, nonischemic cardiomyopathy, RBBB.  Presented to PCP for routine visit, ECG 12/14/16 revealed atrial fibrillation, rate controlled.  He was asymptomatic.  At f/u he remained in atrial fibrillation with controlled ventricular response.  He reported feeling fine.  Denies palpitations, shortness of breath, fatigue, syncope.  He is the  of [employer] at [employer].  We initiated Eliquis 5 mg BID.  Echo 1/19/17 EF 40-45% GENARO 46.89  I recommended DCCV.  He deferred.  Pet stress 3/30/17 negative for ischemia.    Ultimately proceeded with DCCV 7/25/18.  Toprol added.  One week later was back out of rhythm.    ECG reveals atrial fibrillation with ventricular response in the 50's, RBBB.  Continues to feel well.  Walks extensively without difficulty.  Using stairs instead of elevator as much as possible.  Denies dyspnea, palpitations, dizziness, syncope.            Review of Systems   Constitution: Negative. Negative for weakness and malaise/fatigue.   Cardiovascular: Negative for chest pain, dyspnea on exertion, irregular heartbeat, leg swelling, near-syncope, orthopnea, palpitations, paroxysmal nocturnal dyspnea and syncope.   Respiratory: Negative for cough and shortness of breath.    Neurological: Negative for dizziness and light-headedness.   All other systems reviewed and are negative.       Objective:    Physical Exam   Constitutional: He is oriented to person, place, and time. He appears well-developed and well-nourished.   Eyes: Conjunctivae are normal. No scleral icterus.   Neck: No JVD present. No tracheal deviation present.   Cardiovascular: Normal rate and normal heart sounds. An irregularly irregular rhythm present. PMI is not displaced.   Pulmonary/Chest: Effort normal and breath sounds normal. No  respiratory distress.   Abdominal: Soft. There is no hepatosplenomegaly. There is no tenderness.   Musculoskeletal: He exhibits no edema (lower extremity) or tenderness.   Neurological: He is alert and oriented to person, place, and time.   Skin: Skin is warm and dry. No rash noted.   Psychiatric: He has a normal mood and affect. His behavior is normal.         Assessment:       1. Persistent atrial fibrillation    2. Cardiomyopathy, nonischemic    3. Essential hypertension    4. Diabetes mellitus due to abnormal insulin    5. RBBB         Plan:           Persistent atrial fibrillation.  He is asymptomatic, but has depressed EF.  We discussed management options, and whether to pursue a rate control vs rhythm control strategy.  Given the depressed EF, and more recent literature, I would favor the latter.  Discussed options, including PVI vs trial of amiodarone and DCCV and reassess EF.  Also offered continuing with beta blocker and ace inhibitor at this time.  Repeat echo in 3 months. If EF has normalized, given the medications, then pursuing a rate control strategy.  He prefers this, and I believe it is reasonable.    If EF remains diminished, I recommended rhythm control.  His preference would be trial of amiodarone and reassessing EF.

## 2018-09-21 ENCOUNTER — TELEPHONE (OUTPATIENT)
Dept: SLEEP MEDICINE | Facility: CLINIC | Age: 73
End: 2018-09-21

## 2018-09-21 ENCOUNTER — LAB VISIT (OUTPATIENT)
Dept: LAB | Facility: OTHER | Age: 73
End: 2018-09-21
Attending: INTERNAL MEDICINE
Payer: MEDICARE

## 2018-09-21 DIAGNOSIS — E13.9 DIABETES MELLITUS DUE TO ABNORMAL INSULIN: ICD-10-CM

## 2018-09-21 DIAGNOSIS — I10 ESSENTIAL HYPERTENSION: ICD-10-CM

## 2018-09-21 DIAGNOSIS — E11.65 TYPE 2 DIABETES MELLITUS WITH HYPERGLYCEMIA, WITHOUT LONG-TERM CURRENT USE OF INSULIN: ICD-10-CM

## 2018-09-21 DIAGNOSIS — E78.5 HYPERLIPIDEMIA, UNSPECIFIED HYPERLIPIDEMIA TYPE: ICD-10-CM

## 2018-09-21 DIAGNOSIS — Z85.46 H/O PROSTATE CANCER: ICD-10-CM

## 2018-09-21 DIAGNOSIS — I48.21 PERMANENT ATRIAL FIBRILLATION: ICD-10-CM

## 2018-09-21 LAB
ALBUMIN SERPL BCP-MCNC: 4.5 G/DL
ALP SERPL-CCNC: 49 U/L
ALT SERPL W/O P-5'-P-CCNC: 19 U/L
ANION GAP SERPL CALC-SCNC: 14 MMOL/L
AST SERPL-CCNC: 25 U/L
BILIRUB SERPL-MCNC: 2 MG/DL
BUN SERPL-MCNC: 23 MG/DL
CALCIUM SERPL-MCNC: 9.8 MG/DL
CHLORIDE SERPL-SCNC: 104 MMOL/L
CHOLEST SERPL-MCNC: 115 MG/DL
CHOLEST/HDLC SERPL: 2.5 {RATIO}
CO2 SERPL-SCNC: 22 MMOL/L
CREAT SERPL-MCNC: 1 MG/DL
EST. GFR  (AFRICAN AMERICAN): >60 ML/MIN/1.73 M^2
EST. GFR  (NON AFRICAN AMERICAN): >60 ML/MIN/1.73 M^2
ESTIMATED AVG GLUCOSE: 183 MG/DL
GLUCOSE SERPL-MCNC: 103 MG/DL
HBA1C MFR BLD HPLC: 8 %
HDLC SERPL-MCNC: 46 MG/DL
HDLC SERPL: 40 %
LDLC SERPL CALC-MCNC: 51.2 MG/DL
NONHDLC SERPL-MCNC: 69 MG/DL
POTASSIUM SERPL-SCNC: 4.3 MMOL/L
PROT SERPL-MCNC: 7.5 G/DL
SODIUM SERPL-SCNC: 140 MMOL/L
T4 FREE SERPL-MCNC: 1.17 NG/DL
TRIGL SERPL-MCNC: 89 MG/DL
TSH SERPL DL<=0.005 MIU/L-ACNC: 1.8 UIU/ML

## 2018-09-21 PROCEDURE — 84439 ASSAY OF FREE THYROXINE: CPT

## 2018-09-21 PROCEDURE — 84443 ASSAY THYROID STIM HORMONE: CPT

## 2018-09-21 PROCEDURE — 83036 HEMOGLOBIN GLYCOSYLATED A1C: CPT

## 2018-09-21 PROCEDURE — 36415 COLL VENOUS BLD VENIPUNCTURE: CPT

## 2018-09-21 PROCEDURE — 80061 LIPID PANEL: CPT

## 2018-09-21 PROCEDURE — 80053 COMPREHEN METABOLIC PANEL: CPT

## 2018-09-21 NOTE — TELEPHONE ENCOUNTER
Please advise pt to contact his DME who provided machine directly for machine advise. Per our records he goes to [facility].

## 2018-09-21 NOTE — TELEPHONE ENCOUNTER
----- Message from Whit Robb sent at 9/21/2018  9:10 AM CDT -----  Contact: Pt   Name of Who is Calling: KIRILL FAITH [[id number]]      What is the request in detail: Patient states he's cpap isn't working and would like a callback in regards to that. Please contact to further discuss and advise      Can the clinic reply by [facility]: No       What Number to Call Back if not in [facility]: [phone]

## 2018-09-27 ENCOUNTER — TELEPHONE (OUTPATIENT)
Dept: SLEEP MEDICINE | Facility: CLINIC | Age: 73
End: 2018-09-27

## 2018-09-27 PROBLEM — E78.5 HYPERLIPIDEMIA: Status: RESOLVED | Noted: 2017-08-25 | Resolved: 2018-09-27

## 2018-09-27 NOTE — TELEPHONE ENCOUNTER
----- Message from Annette Fernandez sent at 9/27/2018 11:51 AM CDT -----  Regarding: EP referral  Dr. Stack's office wanting to try and get patient in this week if possible. His CPAP machine broke. Please advise.    Thanks!  Annette

## 2018-11-08 ENCOUNTER — OFFICE VISIT (OUTPATIENT)
Dept: SLEEP MEDICINE | Facility: CLINIC | Age: 73
End: 2018-11-08
Payer: MEDICARE

## 2018-11-08 VITALS
HEART RATE: 57 BPM | HEIGHT: 68 IN | BODY MASS INDEX: 30.14 KG/M2 | WEIGHT: 198.88 LBS | DIASTOLIC BLOOD PRESSURE: 74 MMHG | SYSTOLIC BLOOD PRESSURE: 134 MMHG

## 2018-11-08 DIAGNOSIS — G47.33 OBSTRUCTIVE SLEEP APNEA: Primary | ICD-10-CM

## 2018-11-08 PROCEDURE — 99999 PR PBB SHADOW E&M-EST. PATIENT-LVL III: CPT | Mod: PBBFAC,,, | Performed by: NURSE PRACTITIONER

## 2018-11-08 PROCEDURE — 99214 OFFICE O/P EST MOD 30 MIN: CPT | Mod: S$PBB,,, | Performed by: NURSE PRACTITIONER

## 2018-11-08 PROCEDURE — 99213 OFFICE O/P EST LOW 20 MIN: CPT | Mod: PBBFAC | Performed by: NURSE PRACTITIONER

## 2018-11-08 NOTE — PROGRESS NOTES
Erasmo Blair Jo Ann seen in follow-up for ABE management and CPAP equipment check. Last seen in clinic by Dr. Llanes 11/02/2017. This is his initial visit with me.     INTERVAL HISTORY:    11/08/2018 BULL Riggs NP: Continues to use CPAP without break in use. Denies breakthrough symptoms of snoring or witnessed apneas. Sleep is refreshing. Denies oral/nasal issues with Airfit N10. Continues to receive supplies from [facility]. About one month ago current CPAP started malfunctioning - machine would not turn on for a few nights. Per pt he was advised to get an appointment in Sleep Clinic prior to Rx for new machine. After a few nights of no CPAP use, he turned machine on just to see if it would work and it turned back on. Machine was issued in 2009 and has reached useful lifetime. Pt also interested in separate machine that is smaller for travel (travels regularly for work).     ESS 1    CPAP Interrogation: Elite II CPAP 9 cm, Used Hours 4793, Average Use: 7:21 hours, Predicted AHI: 3.8     11/02/2017 Dr. Llanes This 73 y.o. male patient returns for the management of obstructive sleep apnea.    Doing ok with CPAP, nightly use;  No episodes of Afib; xarelto  ABE symptoms: snoring and witnessed apnea  These symptoms are resolved with CPAP.  He benefits from CPAP use.    [facility]ME [facility]    CPAP is a Resmed ELITE II: 2496 hrs; set at ramp 4, CPAP 9 cm    PRIOR SLEEP STUDY 5/1/09: +ABE, baseline AHI 10.2  TITRATION SLEEP STUDY [facility] 6-10-09: Titrated from 4 to 9 cm to eliminate ABE; supine REM seen; final AHI 0    ESS = 3    He has been using CPAP successfully but needs new supplies;  No discontinuation in using CPAP since starting in 2009.    SLEEP ROUTINE:   Bed partner: none   Time to bed: 9-10 pm   Sleep onset latency: 20-30 mins   Disruptions or awakenings: 3-4   Wakeup time: 5:30-6:30 am   Perceived sleep quality: fair   Daytime naps: none    Past Medical History:   Diagnosis Date    Anticoagulant  long-term use     Arthritis     Atrial fibrillation     Basal cell carcinoma     right upper back, left upper arm     Colon polyps     tubular adenoma, repeat in 2016    Diabetes mellitus     Guillain-Burnett     patient denies history of this condition    Hyperlipidemia 8/25/2017    Hypertension     Prostate CA     Prostate cancer     Sleep apnea     Thyroid disease        Past Surgical History:   Procedure Laterality Date    BASAL CELL CARCINOMA EXCISION      BIOPSY LIVER AND ULTRASOUND N/A 6/2/2016    Performed by [facility] OR 2ND FLR    BIOPSY, LIVER, WITH US GUIDANCE N/A 2/7/2013    Performed by [facility] OR 2ND FLR    CARDIOVERSION N/A 7/25/2018    Procedure: CARDIOVERSION;  Surgeon: Harvey Blake MD;  Location: [facility] CATH LAB;  Service: Cardiology;  Laterality: N/A;  AF, NEVAEH/DCCV, MAC, SK, 3 Prep    CARDIOVERSION N/A 7/25/2018    Performed by Harvey Blake MD at [facility] CATH LAB    COLONOSCOPY N/A 4/10/2017    Procedure: COLONOSCOPY;  Surgeon: Rubin Solano MD;  Location: [facility] (4TH FLR);  Service: Endoscopy;  Laterality: N/A;    COLONOSCOPY N/A 4/10/2017    Performed by Rubin Solano MD at [facility] ENDO (4TH FLR)    ECHOCARDIOGRAM,TRANSESOPHAGEAL N/A 7/25/2018    Performed by Harvey [facility] at [facility] CATH LAB    PROSTATECTOMY      TONSILLECTOMY         Family History   Problem Relation Age of Onset    Diabetes Mother     Heart disease Father     Asthma Sister     Diabetes Sister     Skin cancer Neg Hx     Melanoma Neg Hx        Social History     Tobacco Use    Smoking status: Never Smoker    Smokeless tobacco: Never Used   Substance Use Topics    Alcohol use: Yes     Comment: socially    Drug use: Not on file       ALLERGIES: Reviewed in EPIC    CURRENT MEDICATIONS: Reviewed in EPIC    REVIEW OF SYSTEMS:  Sleep related symptoms as per HPI;    Denies weight gain;    Otherwise, a balance of systems reviewed is negative.    PHYSICAL EXAM:  BP  "134/74 (BP Location: Right arm, Patient Position: Sitting)   Pulse (!) 57   Ht 5' 8" (1.727 m)   Wt 90.2 kg (198 lb 13.7 oz)   BMI 30.24 kg/m²       ASSESSMENT:    1. Obstructive Sleep Apnea, mild by AHI (testing 2009). The patient symptomatically has snoring and witnessed apnea and recent finding of atrial fibrillation.11/02/2017: Patient has used CPAP since initial diagnosis. Patient in need of DME supplier and new CPAP supplies; Prior CPAP setting determined by titration sleep study; set at 9 cm; His machine is in good working order.11/08/2018: Continues to use CPAP nightly without break in usage. The patient is experiencing symptomatic benefit with continued resolution of snoring and witnessed apneas. Current machine issued in 2009 malfunctioning and needs to be replaced.     He has medical co-morbidities of atrial fibrillation, hypertension, and obesity.          PLAN:    Treatment options discussed. In light of chronic atrial fibrillation, will plan to continue CPAP as opposed to switching to oral appliance    1. Nasal mask per patient's preference  2. New CPAP set at 9 cm, Dreamstation. Discussed trying detaching humidifier off Dreamstation machine when travelling. If still not compact/convenient enough, will provide pt with Rx for travel CPAP machine  3. DME = [facility]     Precautions: The patient was advised to abstain from driving should they feel sleepy or drowsy.    RTC 31 - 90 days after PAP      "

## 2018-11-08 NOTE — LETTER
November 8, 2018      Chichi Stack MD  [address]  [address]  [facility] [address]           [facility]  [address]  [facility] [address]  Phone: [phone]          Patient: Erasmo Cherry   MR Number: [id number]   YOB: 1945   Date of Visit: 11/8/2018       Dear Dr. Chichi Stack:    Thank you for referring Erasmo Cherry to me for evaluation. Attached you will find relevant portions of my assessment and plan of care.    If you have questions, please do not hesitate to call me. I look forward to following Erasmo Cherry along with you.    Sincerely,    Jeferson Riggs, REZA    Enclosure  CC:  No Recipients    If you would like to receive this communication electronically, please contact [email or web address] or [phone] to request more information on [facility] access.    For providers and/or their staff who would like to refer a patient to [facility], please contact us through our one-stop-shop provider referral line, [facility], at [phone].    If you feel you have received this communication in error or would no longer like to receive these types of communications, please e-mail [email or web address]

## 2018-11-30 ENCOUNTER — TELEPHONE (OUTPATIENT)
Dept: SLEEP MEDICINE | Facility: CLINIC | Age: 73
End: 2018-11-30

## 2018-11-30 NOTE — TELEPHONE ENCOUNTER
Please notify pt that I forwarded patient's message to our Medicare DME patient liason (Alee Mejía).    According to CPAP machine order on 11/08/2018 that I ordered:  CPAP ordered 11/08/2018.   On order marked: Vendor: [employer]   Expected Date of Delivery: 11/15/2018    Advise pt to directly call [facility] to follow-up.

## 2018-11-30 NOTE — TELEPHONE ENCOUNTER
Called pt to let him know that he has to contact [facility] to follow up.Pt stated that he will call but he doesn't want to use [facility].

## 2018-11-30 NOTE — TELEPHONE ENCOUNTER
----- Message from Lashonda Goldman sent at 11/30/2018  8:43 AM CST -----  Name of Who is Calling: KIRILL THOMPSON [[id number]]    What is the request in detail: Pt states he has not heard from [facility]      Can the clinic reply by [facility]:    No       What Number [facility] Back [facility]: #[phone]

## 2018-12-03 ENCOUNTER — HOSPITAL ENCOUNTER (OUTPATIENT)
Dept: CARDIOLOGY | Facility: CLINIC | Age: 73
Discharge: HOME | End: 2018-12-03
Attending: INTERNAL MEDICINE
Payer: MEDICARE

## 2018-12-03 ENCOUNTER — TELEPHONE (OUTPATIENT)
Dept: SLEEP MEDICINE | Facility: CLINIC | Age: 73
End: 2018-12-03

## 2018-12-03 VITALS
WEIGHT: 198 LBS | HEIGHT: 68 IN | BODY MASS INDEX: 30.01 KG/M2 | HEART RATE: 48 BPM | DIASTOLIC BLOOD PRESSURE: 64 MMHG | SYSTOLIC BLOOD PRESSURE: 130 MMHG

## 2018-12-03 DIAGNOSIS — I48.19 PERSISTENT ATRIAL FIBRILLATION: ICD-10-CM

## 2018-12-03 LAB
ASCENDING AORTA: 3.71 CM
AV INDEX (PROSTH): 0.6
AV MEAN GRADIENT: 4.12 MMHG
AV PEAK GRADIENT: 6.86 MMHG
AV VALVE AREA: 1.95 CM2
BSA FOR ECHO PROCEDURE: 2.08 M2
CV ECHO LV RWT: 0.32 CM
DOP CALC AO PEAK VEL: 1.31 M/S
DOP CALC AO VTI: 27.76 CM
DOP CALC LVOT AREA: 3.27 CM2
DOP CALC LVOT DIAMETER: 2.04 CM
DOP CALC LVOT STROKE VOLUME: 54.2 CM3
DOP CALCLVOT PEAK VEL VTI: 16.59 CM
ECHO LV POSTERIOR WALL: 0.82 CM (ref 0.6–1.1)
FRACTIONAL SHORTENING: 40 % (ref 28–44)
INTERVENTRICULAR SEPTUM: 0.82 CM (ref 0.6–1.1)
LA MAJOR: 6.89 CM
LA MINOR: 6.05 CM
LA WIDTH: 4.19 CM
LEFT ATRIUM SIZE: 4.63 CM
LEFT ATRIUM VOLUME INDEX: 51.1 ML/M2
LEFT ATRIUM VOLUME: 106.24 CM3
LEFT INTERNAL DIMENSION IN SYSTOLE: 3.02 CM (ref 2.1–4)
LEFT VENTRICLE DIASTOLIC VOLUME INDEX: 58.39 ML/M2
LEFT VENTRICLE DIASTOLIC VOLUME: 121.45 ML
LEFT VENTRICLE MASS INDEX: 68.8 G/M2
LEFT VENTRICLE SYSTOLIC VOLUME INDEX: 17.1 ML/M2
LEFT VENTRICLE SYSTOLIC VOLUME: 35.48 ML
LEFT VENTRICULAR INTERNAL DIMENSION IN DIASTOLE: 5.06 CM (ref 3.5–6)
LEFT VENTRICULAR MASS: 143.05 G
PISA TR MAX VEL: 2.42 M/S
RA MAJOR: 7.07 CM
RA PRESSURE: 8 MMHG
RA WIDTH: 4.94 CM
RIGHT VENTRICULAR END-DIASTOLIC DIMENSION: 4.5 CM
RV TISSUE DOPPLER FREE WALL SYSTOLIC VELOCITY 1 (APICAL 4 CHAMBER VIEW): 10 M/S
SINUS: 3.17 CM
STJ: 2.72 CM
TDI LATERAL: 13
TDI SEPTAL: 11
TDI: 12
TR MAX PG: 23.43 MMHG
TRICUSPID ANNULAR PLANE SYSTOLIC EXCURSION: 1.88 CM
TV REST PULMONARY ARTERY PRESSURE: 31.43 MMHG

## 2018-12-03 PROCEDURE — 93306 TTE W/DOPPLER COMPLETE: CPT | Mod: PBBFAC | Performed by: INTERNAL MEDICINE

## 2018-12-03 NOTE — TELEPHONE ENCOUNTER
----- Message from Alee Mejía sent at 12/3/2018 10:30 AM CST -----  Regarding: cpap  I will call [facility] and check status and call patient.

## 2018-12-04 ENCOUNTER — PATIENT MESSAGE (OUTPATIENT)
Dept: SLEEP MEDICINE | Facility: CLINIC | Age: 73
End: 2018-12-04

## 2018-12-04 ENCOUNTER — TELEPHONE (OUTPATIENT)
Dept: ELECTROPHYSIOLOGY | Facility: CLINIC | Age: 73
End: 2018-12-04

## 2018-12-05 ENCOUNTER — TELEPHONE (OUTPATIENT)
Dept: SLEEP MEDICINE | Facility: CLINIC | Age: 73
End: 2018-12-05

## 2018-12-05 ENCOUNTER — TELEPHONE (OUTPATIENT)
Dept: ELECTROPHYSIOLOGY | Facility: CLINIC | Age: 73
End: 2018-12-05

## 2018-12-05 NOTE — TELEPHONE ENCOUNTER
Called pt to get the name of the office he was seen at prior to having sleep study done. [facility] [place]

## 2018-12-05 NOTE — TELEPHONE ENCOUNTER
----- Message from John Durbin MA sent at 12/5/2018 11:58 AM CST -----  Contact: Porsha [phone] with Dr. Stack's office  Spoke to pt and scheduled 12/19. Pt accepted appt but said he is in a meeting and will check his schedule after and call back if he needs to r/s. I informed pt I will be out for the next 2 days so he stated he will ask for a message to be sent to you.  ----- Message -----  From: Estefani Gibbs RN  Sent: 12/4/2018   5:05 PM  To: John Durbin MA     Can you please schedule appt. Dr Blake is recommending amiodarone, but patient wanted to discuss with his primary care. Now, primary care wants him to be seen. It will have to be a double book in the next couple of weeks. This is Dr Bardales and Dr Blake is aware.   Thanks  ----- Message -----  From: Yamileth Ashley MA  Sent: 12/4/2018   5:04 PM  To: Estefani Gibbs RN        ----- Message -----  From: Nani Call  Sent: 12/4/2018   4:30 PM  To: Hayden Gabriel Staff    Please call [facility] to anil an appt for the pt, he is in Afib. Soonest appt I got was in Feb.    Thanks

## 2018-12-19 ENCOUNTER — OFFICE VISIT (OUTPATIENT)
Dept: ELECTROPHYSIOLOGY | Facility: CLINIC | Age: 73
End: 2018-12-19
Payer: MEDICARE

## 2018-12-19 ENCOUNTER — HOSPITAL ENCOUNTER (OUTPATIENT)
Dept: CARDIOLOGY | Facility: CLINIC | Age: 73
Discharge: HOME | End: 2018-12-19
Payer: MEDICARE

## 2018-12-19 VITALS
WEIGHT: 201.75 LBS | HEIGHT: 68 IN | SYSTOLIC BLOOD PRESSURE: 112 MMHG | DIASTOLIC BLOOD PRESSURE: 62 MMHG | BODY MASS INDEX: 30.58 KG/M2 | HEART RATE: 61 BPM

## 2018-12-19 DIAGNOSIS — I48.91 ATRIAL FIBRILLATION, UNSPECIFIED TYPE: ICD-10-CM

## 2018-12-19 DIAGNOSIS — I48.19 PERSISTENT ATRIAL FIBRILLATION: Primary | ICD-10-CM

## 2018-12-19 DIAGNOSIS — I45.10 RBBB: ICD-10-CM

## 2018-12-19 DIAGNOSIS — I10 ESSENTIAL HYPERTENSION: ICD-10-CM

## 2018-12-19 DIAGNOSIS — I42.8 CARDIOMYOPATHY, NONISCHEMIC: ICD-10-CM

## 2018-12-19 DIAGNOSIS — E13.9 DIABETES MELLITUS DUE TO ABNORMAL INSULIN: ICD-10-CM

## 2018-12-19 PROCEDURE — 99214 OFFICE O/P EST MOD 30 MIN: CPT | Mod: S$PBB,,, | Performed by: INTERNAL MEDICINE

## 2018-12-19 PROCEDURE — 99999 PR PBB SHADOW E&M-EST. PATIENT-LVL III: CPT | Mod: PBBFAC,,, | Performed by: INTERNAL MEDICINE

## 2018-12-19 PROCEDURE — 99213 OFFICE O/P EST LOW 20 MIN: CPT | Mod: PBBFAC,25 | Performed by: INTERNAL MEDICINE

## 2018-12-19 PROCEDURE — 93010 ELECTROCARDIOGRAM REPORT: CPT | Mod: S$PBB,,, | Performed by: INTERNAL MEDICINE

## 2018-12-19 PROCEDURE — 93005 ELECTROCARDIOGRAM TRACING: CPT | Mod: PBBFAC | Performed by: INTERNAL MEDICINE

## 2018-12-19 RX ORDER — AMIODARONE HYDROCHLORIDE 200 MG/1
200 TABLET ORAL DAILY
Qty: 90 TABLET | Refills: 3 | Status: SHIPPED | OUTPATIENT
Start: 2018-12-19 | End: 2019-03-25

## 2018-12-19 NOTE — PROGRESS NOTES
2Subjective:    Patient ID:  Erasmo Cherry is a 73 y.o. male who presents for follow-up of Persistent atrial fibrillation      HPI 74 yo male with Htn, DM, atrial fibrillation, colon polyps, nonischemic cardiomyopathy, RBBB.  Presented to PCP for routine visit, ECG 12/14/16 revealed atrial fibrillation, rate controlled.  He was asymptomatic.  At f/u he remained in atrial fibrillation with controlled ventricular response.  He reported feeling fine.  Denies palpitations, shortness of breath, fatigue, syncope.  He is the  of [employer] at [facility].  We initiated Eliquis 5 mg BID.  Echo 1/19/17 EF 40-45% GENARO 46.89  I recommended DCCV.  He deferred.  Pet stress 3/30/17 negative for ischemia.     Ultimately proceeded with DCCV 7/25/18.  Toprol added.  One week later was back out of rhythm.    We discussed options, including PVI, amiodarone or continue rate control and reassess EF.    Echo 12/3/18 EF 40%, normal left atrial size.    Of note has undergone liver biopsy x 2, diagnosed with WHEAT.    Has been compliant with Eliquis.        Review of Systems   Constitution: Negative. Negative for weakness and malaise/fatigue.   Cardiovascular: Negative for chest pain, dyspnea on exertion, irregular heartbeat, leg swelling, near-syncope, orthopnea, palpitations, paroxysmal nocturnal dyspnea and syncope.   Respiratory: Negative for cough and shortness of breath.    Neurological: Negative for dizziness and light-headedness.   All other systems reviewed and are negative.       Objective:    Physical Exam   Constitutional: He is oriented to person, place, and time. He appears well-developed and well-nourished.   Eyes: Conjunctivae are normal. No scleral icterus.   Neck: No JVD present. No tracheal deviation present.   Cardiovascular: Normal rate and normal heart sounds. An irregularly irregular rhythm present. PMI is not displaced.   Pulmonary/Chest: Effort normal and breath sounds normal. No respiratory  distress.   Abdominal: Soft. There is no hepatosplenomegaly. There is no tenderness.   Musculoskeletal: He exhibits no edema (lower extremity) or tenderness.   Neurological: He is alert and oriented to person, place, and time.   Skin: Skin is warm and dry. No rash noted.   Psychiatric: He has a normal mood and affect. His behavior is normal.         Assessment:       1. Persistent atrial fibrillation    2. RBBB    3. Essential hypertension    4. Cardiomyopathy, nonischemic    5. 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    As EF has not improved, I endorsed an attempt at rhythm control and re-assessing.  Options are PVI or amiodarone.  He prefers, and I agree (short term) with the latter.  Amiodarone 200 mg daily.  DCCV in one month.  Defer NEVAEH if remains compliant with Eliquis.    If maintains nsr for two months after, repeat echo.  If EF improved, I would encourage PVI at that point.

## 2019-01-02 ENCOUNTER — TELEPHONE (OUTPATIENT)
Dept: ELECTROPHYSIOLOGY | Facility: CLINIC | Age: 74
End: 2019-01-02

## 2019-01-02 NOTE — TELEPHONE ENCOUNTER
Spoke with Dr Cherry. We already had him scheduled for 2/6/19. He confirmed that he started the amiodarone on 12/30/2018. Also confirmed that he has been 100% compliant with Eliquis. He will call me if he misses any doses of Eliquis prior to DCCV. Will send instructions through his patient portal, as requested.

## 2019-01-02 NOTE — TELEPHONE ENCOUNTER
----- Message from Kimber Ortiz MA sent at 1/2/2019  2:07 PM CST -----  Contact: self  Pt. is returning your call. Please call [phone]  pt.He is calling to make an appt. For a procedure after being on med Amiodarone- for a month. Last visit 12/19/18.

## 2019-01-09 ENCOUNTER — PATIENT MESSAGE (OUTPATIENT)
Dept: SLEEP MEDICINE | Facility: CLINIC | Age: 74
End: 2019-01-09

## 2019-01-24 ENCOUNTER — TELEPHONE (OUTPATIENT)
Dept: ELECTROPHYSIOLOGY | Facility: CLINIC | Age: 74
End: 2019-01-24

## 2019-01-24 DIAGNOSIS — I48.19 PERSISTENT ATRIAL FIBRILLATION: Primary | ICD-10-CM

## 2019-01-24 NOTE — TELEPHONE ENCOUNTER
----- Message from John Durbin MA sent at 1/24/2019 12:34 PM CST -----  Contact: Patient      ----- Message -----  From: Lucero Landry  Sent: 1/24/2019  12:03 PM  To: Hayden Wu    [facility] is retuning a call. Please call him back  @ [phone]. Thanks, Lucero

## 2019-01-24 NOTE — TELEPHONE ENCOUNTER
Spoke with patient. Patient would like to ensure he has transportation secured for Cardioversion on 2-6-19. Patient will notify this nurse on today as to outcome.    Oksana PARKER CCM

## 2019-01-24 NOTE — PROGRESS NOTES
CARDIOVERSION EDUCATION CHECK LIST    PRE PROCEDURE TESTING    2-1-19 at 8AM  Pre - procedure labs have been ordered for you at:  [facility]  · Be sure to arrive at your scheduled time. YOU DO NOT HAVE TO FAST FOR THIS LAB WORK!      DAY OF PROCEDURE     2-6-19 @ 6 AM   Report to Cardiology Waiting Room on 3rd floor of the hospital  · Do not eat or drink anything after 12 mn on the night before your procedure.      Medications:  Do not take your oral diabetic medications (GLUCOPHAGE, GLUCOTROL, JARDIANCE) on the morning of your procedure (2-6-19).    · You may take your usual morning medications with a sip of water    FOLLOW-UP EKG TO BE DONE 1 WEEK AFTER CARDIOVERSION ON 2-13-19 AT 730AM AT [facility].    Directions to the Cardiology Waiting Room  If you park in the Parking Garage:  Take elevators to the 2nd floor  Walk up ramp and turn right by Gold Elevators  Take elevator to the 3rd floor  Upon exiting the elevator, turn away from the clinic areas  Walk long ayon around to front of hospital to area with windows overlooking [facility]  Check in at Reception Desk  OR  If family is dropping you off:  Have them drop you off at the front of the Hospital  (Near the ER, where all the flags are hung).  Take the E elevators to the 3rd floor.  Check in at the Reception Desk in the waiting room.        · YOU WILL BE GOING HOME THE SAME DAY AS YOUR PROCEDURE  · YOU WILL NEED SOMEONE TO DRIVE YOU HOME AFTER YOUR PROCEDURE   · YOUR PAIN DURING YOUR PROCEDURE WILL BE MANAGED BY THE ANESTHESIA TEAM      Any need to reschedule or cancel procedures, or any questions regarding your procedure should be addressed directly with the Arrhythmia Department Nurses at the following phone number: [phone]    THE ABOVE INSTRUCTIONS WERE GIVEN TO THE PATIENT VERBALLY AND THEY VERBALIZED UNDERSTANDING. THEY DO NOT REQUIRE ANY SPECIAL NEEDS AND DO NOT HAVE ANY LEARNING BARRIERS.

## 2019-01-24 NOTE — TELEPHONE ENCOUNTER
Spoke with patient. States he already spoke with Marissa earlier today and has no other questions or concerns.

## 2019-01-24 NOTE — TELEPHONE ENCOUNTER
Spoke with patient. Went over the following with patient: pre-operative lab work date and time, EP procedure date, arrival time, pre-operative hold of medications. All questions answered. Advised pt to call clinic if pt had any questions or concerns.       Oksana PARKER CCM

## 2019-02-01 ENCOUNTER — LAB VISIT (OUTPATIENT)
Dept: LAB | Facility: OTHER | Age: 74
End: 2019-02-01
Payer: MEDICARE

## 2019-02-01 DIAGNOSIS — E13.9 DIABETES MELLITUS DUE TO ABNORMAL INSULIN: ICD-10-CM

## 2019-02-01 DIAGNOSIS — E03.9 HYPOTHYROIDISM, UNSPECIFIED TYPE: ICD-10-CM

## 2019-02-01 DIAGNOSIS — I48.19 PERSISTENT ATRIAL FIBRILLATION: ICD-10-CM

## 2019-02-01 LAB
ANION GAP SERPL CALC-SCNC: 15 MMOL/L
APTT BLDCRRT: 33.6 SEC
BASOPHILS # BLD AUTO: 0.02 K/UL
BASOPHILS NFR BLD: 0.3 %
BUN SERPL-MCNC: 21 MG/DL
CALCIUM SERPL-MCNC: 9.9 MG/DL
CHLORIDE SERPL-SCNC: 102 MMOL/L
CO2 SERPL-SCNC: 21 MMOL/L
CREAT SERPL-MCNC: 1.2 MG/DL
DIFFERENTIAL METHOD: NORMAL
EOSINOPHIL # BLD AUTO: 0.1 K/UL
EOSINOPHIL NFR BLD: 1.5 %
ERYTHROCYTE [DISTWIDTH] IN BLOOD BY AUTOMATED COUNT: 12.9 %
EST. GFR  (AFRICAN AMERICAN): >60 ML/MIN/1.73 M^2
EST. GFR  (NON AFRICAN AMERICAN): >60 ML/MIN/1.73 M^2
ESTIMATED AVG GLUCOSE: 206 MG/DL
GLUCOSE SERPL-MCNC: 158 MG/DL
HBA1C MFR BLD HPLC: 8.8 %
HCT VFR BLD AUTO: 49 %
HGB BLD-MCNC: 16.7 G/DL
INR PPP: 1
LYMPHOCYTES # BLD AUTO: 2.7 K/UL
LYMPHOCYTES NFR BLD: 35.7 %
MCH RBC QN AUTO: 30.6 PG
MCHC RBC AUTO-ENTMCNC: 34.1 G/DL
MCV RBC AUTO: 90 FL
MONOCYTES # BLD AUTO: 0.6 K/UL
MONOCYTES NFR BLD: 7.7 %
NEUTROPHILS # BLD AUTO: 4.1 K/UL
NEUTROPHILS NFR BLD: 54.7 %
PLATELET # BLD AUTO: 187 K/UL
PMV BLD AUTO: 11 FL
POTASSIUM SERPL-SCNC: 4.5 MMOL/L
PROTHROMBIN TIME: 11.3 SEC
RBC # BLD AUTO: 5.45 M/UL
SODIUM SERPL-SCNC: 138 MMOL/L
T4 FREE SERPL-MCNC: 1.06 NG/DL
TSH SERPL DL<=0.005 MIU/L-ACNC: 5.85 UIU/ML
WBC # BLD AUTO: 7.43 K/UL

## 2019-02-01 PROCEDURE — 84443 ASSAY THYROID STIM HORMONE: CPT

## 2019-02-01 PROCEDURE — 85610 PROTHROMBIN TIME: CPT

## 2019-02-01 PROCEDURE — 84439 ASSAY OF FREE THYROXINE: CPT

## 2019-02-01 PROCEDURE — 80048 BASIC METABOLIC PNL TOTAL CA: CPT

## 2019-02-01 PROCEDURE — 85025 COMPLETE CBC W/AUTO DIFF WBC: CPT

## 2019-02-01 PROCEDURE — 83036 HEMOGLOBIN GLYCOSYLATED A1C: CPT

## 2019-02-01 PROCEDURE — 85730 THROMBOPLASTIN TIME PARTIAL: CPT

## 2019-02-01 PROCEDURE — 36415 COLL VENOUS BLD VENIPUNCTURE: CPT

## 2019-02-05 ENCOUNTER — TELEPHONE (OUTPATIENT)
Dept: ELECTROPHYSIOLOGY | Facility: CLINIC | Age: 74
End: 2019-02-05

## 2019-02-05 ENCOUNTER — ANESTHESIA EVENT (OUTPATIENT)
Dept: MEDSURG UNIT | Facility: HOSPITAL | Age: 74
End: 2019-02-05
Payer: MEDICARE

## 2019-02-05 NOTE — TELEPHONE ENCOUNTER
Spoke to patient    CONFIRMED procedure arrival time of 6am, 3rd floor SSCU for Cardioversion  Reiterated instructions including:  -Directions to check in desk  -NPO after midnight night prior to procedure  -High importance of HOLDING Glucophage, Glucotrol, and Jardiance in the morning.   -Confirmed compliance of Eliquis. States he has been 100% compliant. NEVAEH has been deferred.      Patient verbalizes understanding of above and appreciates call.

## 2019-02-06 ENCOUNTER — ANESTHESIA (OUTPATIENT)
Dept: MEDSURG UNIT | Facility: HOSPITAL | Age: 74
End: 2019-02-06
Payer: MEDICARE

## 2019-02-06 ENCOUNTER — HOSPITAL ENCOUNTER (OUTPATIENT)
Facility: HOSPITAL | Age: 74
Discharge: HOME | End: 2019-02-06
Attending: INTERNAL MEDICINE | Admitting: INTERNAL MEDICINE
Payer: MEDICARE

## 2019-02-06 VITALS
WEIGHT: 195 LBS | SYSTOLIC BLOOD PRESSURE: 124 MMHG | OXYGEN SATURATION: 97 % | HEART RATE: 64 BPM | BODY MASS INDEX: 29.55 KG/M2 | RESPIRATION RATE: 16 BRPM | DIASTOLIC BLOOD PRESSURE: 81 MMHG | TEMPERATURE: 97 F | HEIGHT: 68 IN

## 2019-02-06 DIAGNOSIS — I48.91 ATRIAL FIBRILLATION: ICD-10-CM

## 2019-02-06 DIAGNOSIS — I48.19 PERSISTENT ATRIAL FIBRILLATION: Primary | ICD-10-CM

## 2019-02-06 LAB
POCT GLUCOSE: 166 MG/DL (ref 70–110)
POCT GLUCOSE: 179 MG/DL (ref 70–110)

## 2019-02-06 PROCEDURE — 92960 CARDIOVERSION ELECTRIC EXT: CPT | Mod: ,,, | Performed by: INTERNAL MEDICINE

## 2019-02-06 PROCEDURE — 37000008 HC ANESTHESIA 1ST 15 MINUTES: Performed by: INTERNAL MEDICINE

## 2019-02-06 PROCEDURE — 63600175 PHARM REV CODE 636 W HCPCS: Performed by: NURSE ANESTHETIST, CERTIFIED REGISTERED

## 2019-02-06 PROCEDURE — 25000003 PHARM REV CODE 250: Performed by: NURSE ANESTHETIST, CERTIFIED REGISTERED

## 2019-02-06 PROCEDURE — 93005 ELECTROCARDIOGRAM TRACING: CPT | Mod: 59

## 2019-02-06 PROCEDURE — 93005 ELECTROCARDIOGRAM TRACING: CPT

## 2019-02-06 PROCEDURE — 82962 GLUCOSE BLOOD TEST: CPT

## 2019-02-06 PROCEDURE — D9220A PRA ANESTHESIA: ICD-10-PCS | Mod: ANES,,, | Performed by: ANESTHESIOLOGY

## 2019-02-06 PROCEDURE — 82962 GLUCOSE BLOOD TEST: CPT | Performed by: INTERNAL MEDICINE

## 2019-02-06 PROCEDURE — D9220A PRA ANESTHESIA: ICD-10-PCS | Mod: CRNA,,, | Performed by: NURSE ANESTHETIST, CERTIFIED REGISTERED

## 2019-02-06 PROCEDURE — D9220A PRA ANESTHESIA: Mod: ANES,,, | Performed by: ANESTHESIOLOGY

## 2019-02-06 PROCEDURE — 93010 ELECTROCARDIOGRAM REPORT: CPT | Mod: ,,, | Performed by: INTERNAL MEDICINE

## 2019-02-06 PROCEDURE — 93010 EKG 12-LEAD: ICD-10-PCS | Mod: 76,,, | Performed by: INTERNAL MEDICINE

## 2019-02-06 PROCEDURE — 25000003 PHARM REV CODE 250: Performed by: INTERNAL MEDICINE

## 2019-02-06 PROCEDURE — 92960 PR CARDIOVERSION, ELECTIVE;EXTERN: ICD-10-PCS | Mod: ,,, | Performed by: INTERNAL MEDICINE

## 2019-02-06 PROCEDURE — 37000009 HC ANESTHESIA EA ADD 15 MINS: Performed by: INTERNAL MEDICINE

## 2019-02-06 PROCEDURE — D9220A PRA ANESTHESIA: Mod: CRNA,,, | Performed by: NURSE ANESTHETIST, CERTIFIED REGISTERED

## 2019-02-06 PROCEDURE — 92960 CARDIOVERSION ELECTRIC EXT: CPT | Performed by: INTERNAL MEDICINE

## 2019-02-06 RX ORDER — LIDOCAINE HCL/PF 100 MG/5ML
SYRINGE (ML) INTRAVENOUS
Status: DISCONTINUED | OUTPATIENT
Start: 2019-02-06 | End: 2019-02-06

## 2019-02-06 RX ORDER — PROPOFOL 10 MG/ML
VIAL (ML) INTRAVENOUS
Status: DISCONTINUED | OUTPATIENT
Start: 2019-02-06 | End: 2019-02-06

## 2019-02-06 RX ORDER — PROPOFOL 10 MG/ML
VIAL (ML) INTRAVENOUS CONTINUOUS PRN
Status: DISCONTINUED | OUTPATIENT
Start: 2019-02-06 | End: 2019-02-06

## 2019-02-06 RX ORDER — SODIUM CHLORIDE 9 MG/ML
INJECTION, SOLUTION INTRAVENOUS CONTINUOUS PRN
Status: DISCONTINUED | OUTPATIENT
Start: 2019-02-06 | End: 2019-02-06

## 2019-02-06 RX ORDER — SODIUM CHLORIDE 0.9 % (FLUSH) 0.9 %
3 SYRINGE (ML) INJECTION
Status: DISCONTINUED | OUTPATIENT
Start: 2019-02-06 | End: 2019-02-06 | Stop reason: HOSPADM

## 2019-02-06 RX ORDER — SODIUM CHLORIDE 0.9 % (FLUSH) 0.9 %
5 SYRINGE (ML) INJECTION
Status: ACTIVE | OUTPATIENT
Start: 2019-02-06

## 2019-02-06 RX ORDER — SILVER SULFADIAZINE 10 G/1000G
CREAM TOPICAL
Status: DISCONTINUED | OUTPATIENT
Start: 2019-02-06 | End: 2019-02-06 | Stop reason: HOSPADM

## 2019-02-06 RX ADMIN — LIDOCAINE HYDROCHLORIDE 75 MG: 20 INJECTION, SOLUTION INTRAVENOUS at 08:02

## 2019-02-06 RX ADMIN — PROPOFOL 100 MG: 10 INJECTION, EMULSION INTRAVENOUS at 08:02

## 2019-02-06 RX ADMIN — PROPOFOL 200 MCG/KG/MIN: 10 INJECTION, EMULSION INTRAVENOUS at 08:02

## 2019-02-06 RX ADMIN — SODIUM CHLORIDE: 0.9 INJECTION, SOLUTION INTRAVENOUS at 08:02

## 2019-02-06 NOTE — ANESTHESIA POSTPROCEDURE EVALUATION
"Anesthesia Post Evaluation    Patient: Erasmo Cherry    Procedure(s) Performed: Procedure(s) (LRB):  CARDIOVERSION (N/A)    Final Anesthesia Type: general  Patient location during evaluation: PACU  Patient participation: Yes- Able to Participate  Level of consciousness: awake and alert and oriented  Post-procedure vital signs: reviewed and stable  Pain management: adequate  Airway patency: patent  PONV status at discharge: No PONV  Anesthetic complications: no      Cardiovascular status: blood pressure returned to baseline  Respiratory status: unassisted  Hydration status: euvolemic  Follow-up not needed.        Visit Vitals  /81 (BP Location: Left arm, Patient Position: Lying)   Pulse 64   Temp 36.1 °C (97 °F) (Oral)   Resp 16   Ht 5' 8" (1.727 m)   Wt 88.5 kg (195 lb)   SpO2 97%   BMI 29.65 kg/m²       Pain/April Score: No Data Recorded      "

## 2019-02-06 NOTE — DISCHARGE SUMMARY
[facility]  Cardiac Electrophysiology  Discharge Summary      Patient Name: Erasmo Cherry  MRN: [id number]  Admission Date: 2/6/2019  Hospital Length of Stay: 0 days  Discharge Date and Time: 2/6/2019 11:38 AM  Attending Physician: Harvey Blake MD    Discharging Provider: Cathy Hickman NP  Primary Care Physician: Chichi Stack MD    HPI: Mr. Cherry is a 73 year old male with a PMHx of HTN, DM, atrial fibrillation, colon polyps, nonischemic cardiomyopathy, RBBB, and WHEAT (undergone liver biopsy x 2).   Presented to PCP for routine visit, ECG 12/14/16 revealed atrial fibrillation, rate controlled.  He was asymptomatic.  At f/u he remained in atrial fibrillation with controlled ventricular response.  He reported feeling fine.  Denies palpitations, shortness of breath, fatigue, syncope.  He is the  of Comprehensive [employer] at \[facility]\"".  We initiated Eliquis 5 mg BID.  Echo 1/19/17 EF 40-45% GENARO 46.89  Dr. Blake recommended DCCV.  He deferred.  Pet stress 3/30/17 negative for ischemia.  Ultimately proceeded with DCCV 7/25/18. Toprol added. One week later was back out of rhythm.  Dr. Blake discussed options, including PVI, amiodarone or continue rate control and reassess EF.  He prefers the latter (short term) and Dr. Blake agreed.   Amiodarone 200 mg daily initiated.   DCCV in one month. Defer NEVAEH if remains compliant with Eliquis.  Echo 12/3/18 EF 40%, normal left atrial size.     He presents today for DCCV with Dr. Blake. NEVAEH defered per Dr. Blake as patient has been 100% compliant with his eliquis. Patient denies any chest pain, fatigue, palpitations, SOB, WILLETT, dizziness, light headedness, weakness, syncope, or near syncopal episodes. He denies any bleeding, infections, rashes, or surgeries in the past 30 days. He is currently taking amiodarone 200 mg daily, lisinopril, Toprol-XL 25 mg daily, and eliquis 5 mg BID (last dose 2/6/19 patient reports full compliance  with eliquis, he has been taking medication as prescribed for > 1 month, denies missing any doses, he is 100% compliant). Patient denies Hx of CVA/TIA.       I have personally reviewed the patient's EKG today, which shows AF at 48 bpm.  ms.    Procedure(s) (LRB):  CARDIOVERSION (N/A)     Indwelling Lines/Drains at time of discharge:  Lines/Drains/Airways          None        Hospital Course: Patient presented in atrial fibrillation. Patient on Eliquis. DCCV with restoration of normal sinus rhythm (see procedure note). Patient tolerated procedure well with no acute complications. Post procedure EKG showed sinus bradycardia with a ventricular rate of 57 bpm. Discussed post procedure EKG and discharge plans with Dr. Blake. Plan to continue home medications including amiodarone 200 mg daily, lisinopril, Toprol-XL 25 mg daily, and eliquis 5 mg BID.   Follow up EKG in 1 week and follow up with Dr. Blake in 4 weeks.  Discharge plans/instructions discussed with patient who verbalized understanding and agreement of plans of care. Patient verbalized he has a ride home. No further questions or concerns voiced at this time. Discharged home in stable condition.     Physical Exam   Constitutional: He is oriented to person, place, and time. He appears well-developed and well-nourished. No distress.   HENT:   Head: Normocephalic and atraumatic.   Mouth/Throat: No oropharyngeal exudate.   Eyes: Conjunctivae are normal. Pupils are equal, round, and reactive to light. Right eye exhibits no discharge. Left eye exhibits no discharge.   Neck: Normal range of motion. Neck supple.   Cardiovascular: Normal heart sounds, intact distal pulses and normal pulses. Regular rhythm present. No extrasystoles are present. Bradycardia present. PMI is not displaced. Exam reveals no gallop and no friction rub.   No murmur heard.  Pulses:       Radial pulses are 2+ on the right side, and 2+ on the left side.        Dorsalis pedis pulses are 2+ on  the right side, and 2+ on the left side.   Pulmonary/Chest: Effort normal and breath sounds normal. No accessory muscle usage. No respiratory distress. He has no decreased breath sounds. He has no wheezes. He has no rhonchi. He has no rales. He exhibits no tenderness.   Abdominal: Soft. Bowel sounds are normal. He exhibits no distension and no mass. There is no tenderness. There is no rebound and no guarding.   Musculoskeletal: Normal range of motion. He exhibits no edema.   Neurological: He is alert and oriented to person, place, and time. He has normal strength. He is not disoriented. No cranial nerve deficit or sensory deficit. He exhibits normal muscle tone. Coordination and gait normal.   Skin: Skin is warm and dry. No rash noted. He is not diaphoretic. No erythema.   Psychiatric: He has a normal mood and affect. His behavior is normal. Judgment and thought content normal.   Nursing note and vitals reviewed.    Consults:   Anesthesia.    Significant Diagnostic Studies: Labs from 2/1/19 through today reviewed.  INR   Lab Results   Component Value Date    INR 1.0 02/01/2019    INR 1.0 07/25/2018    INR 1.0 05/11/2016     Cardiac Graphics: Echocardiogram:   2D echo with color flow doppler:   Results for orders placed or performed during the hospital encounter of 01/19/17   2D echo with color flow doppler   Result Value Ref Range    QEF 40 (A) 55 - 65    Mitral Valve Regurgitation TRIVIAL     Diastolic Dysfunction Yes (A)     Aortic Valve Regurgitation TRIVIAL     Est. PA Systolic Pressure 33.4     Mitral Valve Mobility SYSTOLIC FLATTENING     Tricuspid Valve Regurgitation MILD     and Transthoracic echo (TTE) complete (Cupid Only):   Results for orders placed or performed during the hospital encounter of 12/03/18   Transthoracic echo (TTE) complete   Result Value Ref Range    Ascending aorta 3.71 cm    STJ 2.72 cm    AV mean gradient 4.12 mmHg    Ao peak mercedes 1.31 m/s    Ao VTI 27.76 cm    IVS 0.82 0.6 - 1.1 cm     LA size 4.63 cm    Left Atrium Major Axis 6.89 cm    Left Atrium Minor Axis 6.05 cm    LVIDD 5.06 3.5 - 6.0 cm    LVIDS 3.02 2.1 - 4.0 cm    LVOT diameter 2.04 cm    LVOT peak VTI 16.59 cm    PW 0.82 0.6 - 1.1 cm    RA Major Axis 7.07 cm    RA Width 4.94 cm    RVDD 4.50 cm    Sinus 3.17 cm    TAPSE 1.88 cm    TR Max Juan 2.42 m/s    LA WIDTH 4.19 cm    LV Diastolic Volume 121.45 mL    LV Systolic Volume 35.48 mL    FS 40 %    LA volume 106.24 cm3    LV mass 143.05 g    Left Ventricle Relative Wall Thickness 0.32 cm    AV valve area 1.95 cm2    AV index (prosthetic) 0.60     LVOT area 3.27 cm2    LVOT stroke volume 54.20 cm3    AV peak gradient 6.86 mmHg    Triscuspid Valve Regurgitation Peak Gradient 23.43 mmHg    BSA 2.08 m2    LV Systolic Volume Index 17.1 mL/m2    LV Diastolic Volume Index 58.39 mL/m2    LA Volume Index 51.1 mL/m2    LV Mass Index 68.8 g/m2    TDI SEPTAL 11.00     Right Atrial Pressure (from IVC) 8 mmHg    TDI LATERAL 13.00     RV S' 10.00 m/s    Mean e' 12.00     TV rest pulmonary artery pressure 31.43 mmHg     Final Active Diagnoses:    Diagnosis Date Noted POA    PRINCIPAL PROBLEM:  Persistent atrial fibrillation [I48.1] 01/17/2017 Yes      Problems Resolved During this Admission:     No new Assessment & Plan notes have been filed under this hospital service since the last note was generated.  Service: Arrhythmia    Discharged Condition: stable    Disposition: Home or Self Care    Follow Up:  Follow-up Information     EKG 1 [facility] In 1 week.    Specialty:  Cardiology  Why:  s/p Cardioversion           Harvey Blake MD In 1 month.    Specialties:  Electrophysiology, Cardiology  Why:  s/p Cardioversion  Contact information:  [address] [place] [id number] [phone]                 Patient Instructions:      Diet Cardiac     No driving until:   Order Comments: No driving or operating heavy machinery for 24-48 hours after your procedure because you received sedation.     Other  restrictions (specify):   Order Comments: Medications:  -Continue to take your home medications as listed on your medication list after you are discharged.  -If you have problems or side effects caused by your medications, call your Physician.    New Medications:  None.    Diet  -You may resume oral intake after you are discharged, as long you have no swallowing difficulties.    Side effects:  -You may be drowsy for the remainder of the day because you received sedation.    Because you have received sedation for this procedure:  -Limit activity for the remainder of the day.  -Do not drive or operate any equipment for the remainder of the day.  -Do not smoke for at least 6 hours and until you are fully awake and alert.  -Do not drink alcoholic beverage for 24 hours.  -Defer important decision making until the following day.    Go to the Emergency Department if you develop:  -Bleeding  -Weakness or numbness  -Visual, gait or speech disturbance  -New chest pain, palpitations, shortness of breath, rapid heart beat, or fainting  -Fever    Follow up:  -EKG in 1 week.  -Dr. Blake in 1 month.     Notify your health care provider if you experience any of the following:   Order Comments: For any concerning medical symptoms.     Notify your health care provider if you experience any of the following:  increased confusion or weakness     Notify your health care provider if you experience any of the following:  persistent dizziness, light-headedness, or visual disturbances     Notify your health care provider if you experience any of the following:  worsening rash     Notify your health care provider if you experience any of the following:  severe persistent headache     Notify your health care provider if you experience any of the following:  difficulty breathing or increased cough     Notify your health care provider if you experience any of the following:  redness, tenderness, or signs of infection (pain, swelling, redness,  odor or green/yellow discharge around incision site)     Notify your health care provider if you experience any of the following:  severe uncontrolled pain     Notify your health care provider if you experience any of the following:  persistent nausea and vomiting or diarrhea     Notify your health care provider if you experience any of the following:  temperature >100.4     Medications:  Reconciled Home Medications:      Medication List      CONTINUE taking these medications    amiodarone 200 MG Tab  Commonly known as:  PACERONE  Take 1 tablet (200 mg total) by mouth once daily.     atorvastatin 10 MG tablet  Commonly known as:  LIPITOR  TAKE 1 TABLET DAILY     ELIQUIS 5 mg Tab  Generic drug:  apixaban  TAKE 1 TABLET TWICE A DAY     FREESTYLE LITE STRIPS Strp  Generic drug:  blood sugar diagnostic  USE TO TEST TWICE A DAY AS NEEDED     glipiZIDE 2.5 MG Tr24  Commonly known as:  GLUCOTROL  TAKE 1 TABLET THREE TIMES A DAY WITH MEALS     JARDIANCE 25 mg Tab  Generic drug:  empagliflozin  TAKE 1 TABLET ONCE DAILY     levothyroxine 75 MCG tablet  Commonly known as:  SYNTHROID  Take 1 tablet (75 mcg total) by mouth before breakfast.     lisinopril 20 MG tablet  Commonly known as:  PRINIVIL,ZESTRIL  TAKE 1 TABLET DAILY     metFORMIN 750 MG 24 hr tablet  Commonly known as:  GLUCOPHAGE-XR  TAKE 1 TABLET TWICE A DAY WITH MEALS     metoprolol succinate 25 MG 24 hr tablet  Commonly known as:  TOPROL-XL  Take 1 tablet (25 mg total) by mouth once daily.     VITAMIN D2 50,000 unit Cap  Generic drug:  ergocalciferol  TAKE 1 CAPSULE EVERY 7 DAYS     vitamin E 800 UNIT capsule  Take 800 Units by mouth once daily.          Plan:  -Continue all home medications especially amiodarone 200 mg daily.  -Follow up EKG in 1 week.  -Follow up with Dr. Blake in 1 month.    Time spent on the discharge of patient: 14 minutes    Cathy Hickamn NP  Cardiac Electrophysiology  [facility]    Attending: Harvey Blake MD

## 2019-02-06 NOTE — ASSESSMENT & PLAN NOTE
-DCCV  -NEVAEH deferred per Dr. Blake as patient reports full compliance with eliquis, he has been taking medication as prescribed for > 1 month, denies missing any doses, he is 100% compliant. Patient denies Hx of CVA/TIA.   -Anesthesia for sedation.

## 2019-02-06 NOTE — SUBJECTIVE & OBJECTIVE
Past Medical History:   Diagnosis Date    Anticoagulant long-term use     Arthritis     Atrial fibrillation     Basal cell carcinoma     right upper back, left upper arm     Colon polyps     tubular adenoma, repeat in 2016    Diabetes mellitus     Guillain-[place]     patient denies history of this condition    Hyperlipidemia 8/25/2017    Hypertension     Prostate CA     Prostate cancer     Sleep apnea     Thyroid disease        Past Surgical History:   Procedure Laterality Date    BASAL CELL CARCINOMA EXCISION      BIOPSY LIVER AND ULTRASOUND N/A 6/2/2016    Performed by [facility] at [facility] OR 2ND FLR    BIOPSY, LIVER, WITH US GUIDANCE N/A 2/7/2013    Performed by [facility] at [facility] OR 2ND FLR    CARDIOVERSION N/A 7/25/2018    Performed by Harvey Blake MD at [facility] CATH LAB    COLONOSCOPY N/A 4/10/2017    Performed by Rubin Solano MD at [facility] ENDO (4TH FLR)    ECHOCARDIOGRAM,TRANSESOPHAGEAL N/A 7/25/2018    Performed by Harvey Blake MD at [facility] CATH LAB    PROSTATECTOMY      TONSILLECTOMY         Review of patient's allergies indicates:  No Known Allergies    No current facility-administered medications on file prior to encounter.      Current Outpatient Medications on File Prior to Encounter   Medication Sig    amiodarone (PACERONE) 200 MG Tab Take 1 tablet (200 mg total) by mouth once daily.    atorvastatin (LIPITOR) 10 MG tablet TAKE 1 TABLET DAILY    ELIQUIS 5 mg Tab TAKE 1 TABLET TWICE A DAY    glipiZIDE (GLUCOTROL) 2.5 MG TR24 TAKE 1 TABLET THREE TIMES A DAY WITH MEALS    JARDIANCE 25 mg Tab TAKE 1 TABLET ONCE DAILY    lisinopril (PRINIVIL,ZESTRIL) 20 MG tablet TAKE 1 TABLET DAILY    metFORMIN (GLUCOPHAGE-XR) 750 MG 24 hr tablet TAKE 1 TABLET TWICE A DAY WITH MEALS    metoprolol succinate (TOPROL-XL) 25 MG 24 hr tablet Take 1 tablet (25 mg total) by mouth once daily.    VITAMIN D2 50,000 unit capsule TAKE 1 CAPSULE EVERY 7 DAYS    vitamin E 800 UNIT capsule  Take 800 Units by mouth once daily.    FREESTYLE LITE STRIPS Strp USE TO TEST TWICE A DAY AS NEEDED    [DISCONTINUED] lancets (ONE TOUCH ULTRASOFT LANCETS) Misc 1 each by Misc.(Non-Drug; Combo Route) route 2 (two) times daily as needed.     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   HENT: Negative for congestion and nosebleeds.    Eyes: Negative for blurred vision.   Cardiovascular: Negative for chest pain, dyspnea on exertion, irregular heartbeat, leg swelling, near-syncope, orthopnea, palpitations, paroxysmal nocturnal dyspnea and syncope.   Respiratory: Negative for cough, hemoptysis, shortness of breath, sleep disturbances due to breathing, sputum production and wheezing.    Endocrine: Negative for polyphagia.   Hematologic/Lymphatic: Negative for bleeding problem. Does not bruise/bleed easily.   Skin: Negative for itching and rash.   Musculoskeletal: Negative for back pain, joint swelling, muscle cramps and muscle weakness.   Gastrointestinal: Negative for bloating, abdominal pain, hematemesis, hematochezia, nausea and vomiting.   Genitourinary: Negative for dysuria and hematuria.   Neurological: Negative for dizziness, focal weakness, headaches, light-headedness, loss of balance and numbness.   Psychiatric/Behavioral: Negative for altered mental status.     Objective:     Vital Signs (Most Recent):  Temp: 97 °F (36.1 °C) (02/06/19 0600)  Pulse: (!) 54 (02/06/19 0600)  Resp: 20 (02/06/19 0600)  BP: (!) 152/81 (02/06/19 0605)  SpO2: 96 % (02/06/19 0600) Vital Signs (24h  Range):  Temp:  [97 °F (36.1 °C)] 97 °F (36.1 °C)  Pulse:  [54] 54  Resp:  [20] 20  SpO2:  [96 %] 96 %  BP: (149-152)/(75-81) 152/81       Weight: 88.5 kg (195 lb)  Body mass index is 29.65 kg/m².    SpO2: 96 %  O2 Device (Oxygen Therapy): room air    Physical Exam   Constitutional: He is oriented to person, place, and time. He appears well-developed and well-nourished. No distress.   HENT:   Head: Normocephalic and atraumatic.   Mouth/Throat: No oropharyngeal exudate.   Eyes: Conjunctivae are normal. Pupils are equal, round, and reactive to light. Right eye exhibits no discharge. Left eye exhibits no discharge.   Neck: Normal range of motion. Neck supple.   Cardiovascular: Normal heart sounds, intact distal pulses and normal pulses. An irregularly irregular rhythm present.  No extrasystoles are present. Bradycardia present. PMI is not displaced. Exam reveals no gallop and no friction rub.   No murmur heard.  Pulses:       Radial pulses are 2+ on the right side, and 2+ on the left side.        Dorsalis pedis pulses are 2+ on the right side, and 2+ on the left side.   Pulmonary/Chest: Effort normal and breath sounds normal. No accessory muscle usage. No respiratory distress. He has no decreased breath sounds. He has no wheezes. He has no rhonchi. He has no rales. He exhibits no tenderness.   Abdominal: Soft. Bowel sounds are normal. He exhibits no distension and no mass. There is no tenderness. There is no rebound and no guarding.   Musculoskeletal: Normal range of motion. He exhibits no edema.   Neurological: He is alert and oriented to person, place, and time. He has normal strength. He is not disoriented. No cranial nerve deficit or sensory deficit. He exhibits normal muscle tone. Coordination and gait normal.   Skin: Skin is warm and dry. No rash noted. He is not diaphoretic. No erythema.   Psychiatric: He has a normal mood and affect. His behavior is normal. Judgment and thought content normal.   Nursing note  and vitals reviewed.      Significant Labs: BMP: No results for input(s): GLU, NA, K, CL, CO2, BUN, CREATININE, CALCIUM, MG in the last 48 hours., CMP: No results for input(s): NA, K, CL, CO2, GLU, BUN, CREATININE, CALCIUM, PROT, ALBUMIN, BILITOT, ALKPHOS, AST, ALT, ANIONGAP, ESTGFRAFRICA, EGFRNONAA in the last 48 hours., CBC: No results for input(s): WBC, HGB, HCT, PLT in the last 48 hours. and INR: No results for input(s): INR, PROTIME in the last 48 hours.    Significant Imaging: Cardiac Cath: **, CT scan: CT ABDOMEN PELVIS WITH CONTRAST: No results found for this visit on 02/06/19. and CT ABDOMEN PELVIS WITHOUT CONTRAST: No results found for this visit on 02/06/19., Echocardiogram:   2D echo with color flow doppler:   Results for orders placed or performed during the hospital encounter of 01/19/17   2D echo with color flow doppler   Result Value Ref Range    QEF 40 (A) 55 - 65    Mitral Valve Regurgitation TRIVIAL     Diastolic Dysfunction Yes (A)     Aortic Valve Regurgitation TRIVIAL     Est. PA Systolic Pressure 33.4     Mitral Valve Mobility SYSTOLIC FLATTENING     Tricuspid Valve Regurgitation MILD     and Transthoracic echo (TTE) complete (Cupid Only):   Results for orders placed or performed during the hospital encounter of 12/03/18   Transthoracic echo (TTE) complete   Result Value Ref Range    Ascending aorta 3.71 cm    STJ 2.72 cm    AV mean gradient 4.12 mmHg    Ao peak juan 1.31 m/s    Ao VTI 27.76 cm    IVS 0.82 0.6 - 1.1 cm    LA size 4.63 cm    Left Atrium Major Axis 6.89 cm    Left Atrium Minor Axis 6.05 cm    LVIDD 5.06 3.5 - 6.0 cm    LVIDS 3.02 2.1 - 4.0 cm    LVOT diameter 2.04 cm    LVOT peak VTI 16.59 cm    PW 0.82 0.6 - 1.1 cm    RA Major Axis 7.07 cm    RA Width 4.94 cm    RVDD 4.50 cm    Sinus 3.17 cm    TAPSE 1.88 cm    TR Max Juan 2.42 m/s    LA WIDTH 4.19 cm    LV Diastolic Volume 121.45 mL    LV Systolic Volume 35.48 mL    FS 40 %    LA volume 106.24 cm3    LV mass 143.05 g    Left  Ventricle Relative Wall Thickness 0.32 cm    AV valve area 1.95 cm2    AV index (prosthetic) 0.60     LVOT area 3.27 cm2    LVOT stroke volume 54.20 cm3    AV peak gradient 6.86 mmHg    Triscuspid Valve Regurgitation Peak Gradient 23.43 mmHg    BSA 2.08 m2    LV Systolic Volume Index 17.1 mL/m2    LV Diastolic Volume Index 58.39 mL/m2    LA Volume Index 51.1 mL/m2    LV Mass Index 68.8 g/m2    TDI SEPTAL 11.00     Right Atrial Pressure (from IVC) 8 mmHg    TDI LATERAL 13.00     RV S' 10.00 m/s    Mean e' 12.00     TV rest pulmonary artery pressure 31.43 mmHg    and Ejection fraction: No results for input(s): EF in the last 168 hours.

## 2019-02-06 NOTE — H&P
[facility]  Cardiac Electrophysiology  History and Physical     Admission Date: 2/6/2019  Code Status: No Order   Attending Provider: Harvey Blake MD   Principal Problem:<principal problem not specified>    Subjective:     Chief Complaint:  Atrial fibrillation     HPI: Mr. Cherry is a 73 year old male with a PMHx of HTN, DM, atrial fibrillation, colon polyps, nonischemic cardiomyopathy, RBBB, and WHEAT (undergone liver biopsy x 2).   Presented to PCP for routine visit, ECG 12/14/16 revealed atrial fibrillation, rate controlled.  He was asymptomatic.  At f/u he remained in atrial fibrillation with controlled ventricular response.  He reported feeling fine.  Denies palpitations, shortness of breath, fatigue, syncope.  He is the  of [employer] at [employer].  We initiated Eliquis 5 mg BID.  Echo 1/19/17 EF 40-45% GENARO 46.89  Dr. Blake recommended DCCV.  He deferred.  Pet stress 3/30/17 negative for ischemia.  Ultimately proceeded with DCCV 7/25/18. Toprol added. One week later was back out of rhythm.  Dr. Blake discussed options, including PVI, amiodarone or continue rate control and reassess EF.  He prefers the latter (short term) and Dr. Blake agreed.   Amiodarone 200 mg daily initiated.   DCCV in one month. Defer NEVAEH if remains compliant with Eliquis.  Echo 12/3/18 EF 40%, normal left atrial size.    He presents today for DCCV with Dr. Blake. NEVAEH defered per Dr. Blake as patient has been 100% compliant with his eliquis. Patient denies any chest pain, fatigue, palpitations, SOB, WILLETT, dizziness, light headedness, weakness, syncope, or near syncopal episodes. He denies any bleeding, infections, rashes, or surgeries in the past 30 days. He is currently taking amiodarone 200 mg daily, lisinopril, Toprol-XL 25 mg daily, and eliquis 5 mg BID (last dose 2/6/19 patient reports full compliance with eliquis, he has been taking medication as prescribed for > 1 month, denies missing any  doses, he is 100% compliant). Patient denies Hx of CVA/TIA.      I have personally reviewed the patient's EKG today, which shows AF at 48 bpm.  ms.    Past Medical History:   Diagnosis Date    Anticoagulant long-term use     Arthritis     Atrial fibrillation     Basal cell carcinoma     right upper back, left upper arm     Colon polyps     tubular adenoma, repeat in 2016    Diabetes mellitus     Guillain-[place]     patient denies history of this condition    Hyperlipidemia 8/25/2017    Hypertension     Prostate CA     Prostate cancer     Sleep apnea     Thyroid disease        Past Surgical History:   Procedure Laterality Date    BASAL CELL CARCINOMA EXCISION      BIOPSY LIVER AND ULTRASOUND N/A 6/2/2016    Performed by [facility] Diagnostic Provider at [facility] OR 2ND FLR    BIOPSY, LIVER, WITH US GUIDANCE N/A 2/7/2013    Performed by [facility] at [facility] OR 2ND FLR    CARDIOVERSION N/A 7/25/2018    Performed by Harvey Blake MD at [facility] CATH LAB    COLONOSCOPY N/A 4/10/2017    Performed by Rubin Solano MD at [facility] ENDO (4TH FLR)    ECHOCARDIOGRAM,TRANSESOPHAGEAL N/A 7/25/2018    Performed by Harvey Blake MD at [facility] CATH LAB    PROSTATECTOMY      TONSILLECTOMY         Review of patient's allergies indicates:  No Known Allergies    No current facility-administered medications on file prior to encounter.      Current Outpatient Medications on File Prior to Encounter   Medication Sig    amiodarone (PACERONE) 200 MG Tab Take 1 tablet (200 mg total) by mouth once daily.    atorvastatin (LIPITOR) 10 MG tablet TAKE 1 TABLET DAILY    ELIQUIS 5 mg Tab TAKE 1 TABLET TWICE A DAY    glipiZIDE (GLUCOTROL) 2.5 MG TR24 TAKE 1 TABLET THREE TIMES A DAY WITH MEALS    JARDIANCE 25 mg Tab TAKE 1 TABLET ONCE DAILY    lisinopril (PRINIVIL,ZESTRIL) 20 MG tablet TAKE 1 TABLET DAILY    metFORMIN (GLUCOPHAGE-XR) 750 MG 24 hr tablet TAKE 1 TABLET TWICE A DAY WITH MEALS    metoprolol succinate (TOPROL-XL) 25 MG  24 hr tablet Take 1 tablet (25 mg total) by mouth once daily.    VITAMIN D2 50,000 unit capsule TAKE 1 CAPSULE EVERY 7 DAYS    vitamin E 800 UNIT capsule Take 800 Units by mouth once daily.    FREESTYLE LITE STRIPS Strp USE TO TEST TWICE A DAY AS NEEDED    [DISCONTINUED] lancets (ONE TOUCH ULTRASOFT LANCETS) Misc 1 each by Misc.(Non-Drug; Combo Route) route 2 (two) times daily as needed.     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   HENT: Negative for congestion and nosebleeds.    Eyes: Negative for blurred vision.   Cardiovascular: Negative for chest pain, dyspnea on exertion, irregular heartbeat, leg swelling, near-syncope, orthopnea, palpitations, paroxysmal nocturnal dyspnea and syncope.   Respiratory: Negative for cough, hemoptysis, shortness of breath, sleep disturbances due to breathing, sputum production and wheezing.    Endocrine: Negative for polyphagia.   Hematologic/Lymphatic: Negative for bleeding problem. Does not bruise/bleed easily.   Skin: Negative for itching and rash.   Musculoskeletal: Negative for back pain, joint swelling, muscle cramps and muscle weakness.   Gastrointestinal: Negative for bloating, abdominal pain, hematemesis, hematochezia, nausea and vomiting.   Genitourinary: Negative for dysuria and hematuria.   Neurological: Negative for dizziness, focal weakness, headaches, light-headedness, loss of balance and numbness.   Psychiatric/Behavioral: Negative for altered mental status.     Objective:     Vital Signs (Most Recent):  Temp: 97 °F (36.1  °C) (02/06/19 0600)  Pulse: (!) 54 (02/06/19 0600)  Resp: 20 (02/06/19 0600)  BP: (!) 152/81 (02/06/19 0605)  SpO2: 96 % (02/06/19 0600) Vital Signs (24h Range):  Temp:  [97 °F (36.1 °C)] 97 °F (36.1 °C)  Pulse:  [54] 54  Resp:  [20] 20  SpO2:  [96 %] 96 %  BP: (149-152)/(75-81) 152/81     Weight: 88.5 kg (195 lb)  Body mass index is 29.65 kg/m².    SpO2: 96 %  O2 Device (Oxygen Therapy): room air    Physical Exam   Constitutional: He is oriented to person, place, and time. He appears well-developed and well-nourished. No distress.   HENT:   Head: Normocephalic and atraumatic.   Mouth/Throat: No oropharyngeal exudate.   Eyes: Conjunctivae are normal. Pupils are equal, round, and reactive to light. Right eye exhibits no discharge. Left eye exhibits no discharge.   Neck: Normal range of motion. Neck supple.   Cardiovascular: Normal heart sounds, intact distal pulses and normal pulses. An irregularly irregular rhythm present.  No extrasystoles are present. Bradycardia present. PMI is not displaced. Exam reveals no gallop and no friction rub.   No murmur heard.  Pulses:       Radial pulses are 2+ on the right side, and 2+ on the left side.        Dorsalis pedis pulses are 2+ on the right side, and 2+ on the left side.   Pulmonary/Chest: Effort normal and breath sounds normal. No accessory muscle usage. No respiratory distress. He has no decreased breath sounds. He has no wheezes. He has no rhonchi. He has no rales. He exhibits no tenderness.   Abdominal: Soft. Bowel sounds are normal. He exhibits no distension and no mass. There is no tenderness. There is no rebound and no guarding.   Musculoskeletal: Normal range of motion. He exhibits no edema.   Neurological: He is alert and oriented to person, place, and time. He has normal strength. He is not disoriented. No cranial nerve deficit or sensory deficit. He exhibits normal muscle tone. Coordination and gait normal.   Skin: Skin is warm and dry. No rash noted. He is not  diaphoretic. No erythema.   Psychiatric: He has a normal mood and affect. His behavior is normal. Judgment and thought content normal.   Nursing note and vitals reviewed.    Significant Labs: Labs from 2/1/19 reviewed.    Significant Imaging: Echocardiogram:   2D echo with color flow doppler:   Results for orders placed or performed during the hospital encounter of 01/19/17   2D echo with color flow doppler   Result Value Ref Range    QEF 40 (A) 55 - 65    Mitral Valve Regurgitation TRIVIAL     Diastolic Dysfunction Yes (A)     Aortic Valve Regurgitation TRIVIAL     Est. PA Systolic Pressure 33.4     Mitral Valve Mobility SYSTOLIC FLATTENING     Tricuspid Valve Regurgitation MILD     and Transthoracic echo (TTE) complete (Cupid Only):   Results for orders placed or performed during the hospital encounter of 12/03/18   Transthoracic echo (TTE) complete   Result Value Ref Range    Ascending aorta 3.71 cm    STJ 2.72 cm    AV mean gradient 4.12 mmHg    Ao peak juan 1.31 m/s    Ao VTI 27.76 cm    IVS 0.82 0.6 - 1.1 cm    LA size 4.63 cm    Left Atrium Major Axis 6.89 cm    Left Atrium Minor Axis 6.05 cm    LVIDD 5.06 3.5 - 6.0 cm    LVIDS 3.02 2.1 - 4.0 cm    LVOT diameter 2.04 cm    LVOT peak VTI 16.59 cm    PW 0.82 0.6 - 1.1 cm    RA Major Axis 7.07 cm    RA Width 4.94 cm    RVDD 4.50 cm    Sinus 3.17 cm    TAPSE 1.88 cm    TR Max Juan 2.42 m/s    LA WIDTH 4.19 cm    LV Diastolic Volume 121.45 mL    LV Systolic Volume 35.48 mL    FS 40 %    LA volume 106.24 cm3    LV mass 143.05 g    Left Ventricle Relative Wall Thickness 0.32 cm    AV valve area 1.95 cm2    AV index (prosthetic) 0.60     LVOT area 3.27 cm2    LVOT stroke volume 54.20 cm3    AV peak gradient 6.86 mmHg    Triscuspid Valve Regurgitation Peak Gradient 23.43 mmHg    BSA 2.08 m2    LV Systolic Volume Index 17.1 mL/m2    LV Diastolic Volume Index 58.39 mL/m2    LA Volume Index 51.1 mL/m2    LV Mass Index 68.8 g/m2    TDI SEPTAL 11.00     Right Atrial Pressure  (from IVC) 8 mmHg    TDI LATERAL 13.00     RV S' 10.00 m/s    Mean e' 12.00     TV rest pulmonary artery pressure 31.43 mmHg       Assessment and Plan:     Persistent atrial fibrillation    -DCCV  -NEVAEH deferred per Dr. Blake as patient reports full compliance with eliquis, he has been taking medication as prescribed for > 1 month, denies missing any doses, he is 100% compliant. Patient denies Hx of CVA/TIA.   -Anesthesia for sedation.         Prior to procedure, extensive discussion with patient regarding risks and benefits of DCCV, and patient would like to proceed. Dr. Blake at bedside speaking with patient. Notified patient he will need a ride home after the procedure-He states Ms. Dianne Harman will be driving him home.  The patient voices understanding and all questions have been answered. No further questions/concerns voiced at this time.     Cathy Hickman NP  Cardiac Electrophysiology  [facility]    Attending: Harvey Blake MD

## 2019-02-06 NOTE — HPI
Mr. Cherry is a 73 year old male with a PMHx of HTN, DM, atrial fibrillation, colon polyps, nonischemic cardiomyopathy, RBBB, and WHEAT (undergone liver biopsy x 2).   Presented to PCP for routine visit, ECG 12/14/16 revealed atrial fibrillation, rate controlled.  He was asymptomatic.  At f/u he remained in atrial fibrillation with controlled ventricular response.  He reported feeling fine.  Denies palpitations, shortness of breath, fatigue, syncope.  He is the  of Comprehensive [employer] at [facility] [employer].  We initiated Eliquis 5 mg BID.  Echo 1/19/17 EF 40-45% GENARO 46.89  Dr. Blake recommended DCCV.  He deferred.  Pet stress 3/30/17 negative for ischemia.  Ultimately proceeded with DCCV 7/25/18. Toprol added. One week later was back out of rhythm.  Dr. Blake discussed options, including PVI, amiodarone or continue rate control and reassess EF.  He prefers the latter (short term) and Dr. Blake agreed.   Amiodarone 200 mg daily initiated.   DCCV in one month. Defer NEVAEH if remains compliant with Eliquis.  Echo 12/3/18 EF 40%, normal left atrial size.  He presents today for DCCV with Dr. Blake. NEVAEH defered per Dr. Blake as patient has been 100% compliant with his eliquis. Patient denies any chest pain, fatigue, palpitations, SOB, WILLETT, dizziness, light headedness, weakness, syncope, or near syncopal episodes. He denies any bleeding, infections, rashes, or surgeries in the past 30 days. He is currently taking amiodarone 200 mg daily, lisinopril, Toprol-XL 25 mg daily, and eliquis 5 mg BID (last dose 2/6/19 patient reports full compliance with eliquis, he has been taking medication as prescribed for > 1 month, denies missing any doses, he is 100% compliant). Patient denies Hx of CVA/TIA.    I have personally reviewed the patient's EKG today, which shows AF at 48 bpm.  ms.

## 2019-02-06 NOTE — TRANSFER OF CARE
"Anesthesia Transfer of Care Note    Patient: Erasmo Cherry    Procedure(s) Performed: Procedure(s) (LRB):  CARDIOVERSION (N/A)    Patient location: OPS    Anesthesia Type: general    Transport from OR: Transported from OR on room air with adequate spontaneous ventilation    Post pain: adequate analgesia    Post assessment: no apparent anesthetic complications and tolerated procedure well    Post vital signs: stable    Level of consciousness: awake, alert and oriented    Nausea/Vomiting: no nausea/vomiting    Complications: none    Transfer of care protocol was followed      Last vitals:   Visit Vitals  BP (!) 152/81 (BP Location: Left arm, Patient Position: Lying)   Pulse (!) 54   Temp 36.1 °C (97 °F) (Oral)   Resp 20   Ht 5' 8" (1.727 m)   Wt 88.5 kg (195 lb)   SpO2 96%   BMI 29.65 kg/m²     "

## 2019-02-06 NOTE — ANESTHESIA PREPROCEDURE EVALUATION
02/06/2019  Erasmo Cherry is a 73 y.o., male with pmh listed below here for cardioversion.     Past Medical History:   Diagnosis Date    Anticoagulant long-term use     Arthritis     Atrial fibrillation     Basal cell carcinoma     right upper back, left upper arm     Colon polyps     tubular adenoma, repeat in 2016    Diabetes mellitus     Guillain[place]     patient denies history of this condition    Hyperlipidemia 8/25/2017    Hypertension     Prostate CA     Prostate cancer     Sleep apnea     Thyroid disease            Anesthesia Evaluation    I have reviewed the Patient Summary Reports.     I have reviewed the Medications.     Review of Systems  Anesthesia Hx:  No problems with previous Anesthesia   Denies Personal Hx of Anesthesia complications.   Cardiovascular:   Hypertension Dysrhythmias atrial fibrillation    Pulmonary:   Sleep Apnea    Hepatic/GI:   Liver Disease, (WHEAT)    Neurological:   Neuromuscular Disease, (history of guillion barre)    Endocrine:   Diabetes        Physical Exam  General:  Well nourished    Airway/Jaw/Neck:  Airway Findings: Mouth Opening: Normal Tongue: Normal  General Airway Assessment: Adult  Jaw/Neck Findings:  Neck ROM: Normal ROM      Dental:  Dental Findings: In tact   Chest/Lungs:  Chest/Lungs Findings: Clear to auscultation, Normal Respiratory Rate     Heart/Vascular:  Heart Findings: Rate: Normal  Rhythm: Irregularly Irregular  Sounds: Normal        Mental Status:  Mental Status Findings: Normal        Anesthesia Plan  Type of Anesthesia, risks & benefits discussed:  Anesthesia Type:  general, MAC  Patient's Preference:   Intra-op Monitoring Plan: standard ASA monitors  Intra-op Monitoring Plan Comments:   Post Op Pain Control Plan: multimodal analgesia, IV/PO Opioids PRN and per primary service following discharge from PACU  Post Op Pain Control  Plan Comments:   Induction:   IV  Beta Blocker:  Patient is on a Beta-Blocker and has received one dose within the past 24 hours (No further documentation required).       Informed Consent: Patient understands risks and agrees with Anesthesia plan.  Questions answered. Anesthesia consent signed with patient.  ASA Score: 3     Day of Surgery Review of History & Physical: I have interviewed and examined the patient. I have reviewed the patient's H&P dated: 12/19/18. There are no significant changes.  H&P update referred to the surgeon.         Ready For Surgery From Anesthesia Perspective.

## 2019-02-13 ENCOUNTER — HOSPITAL ENCOUNTER (OUTPATIENT)
Dept: CARDIOLOGY | Facility: CLINIC | Age: 74
Discharge: HOME | End: 2019-02-13
Payer: MEDICARE

## 2019-02-13 DIAGNOSIS — I48.19 PERSISTENT ATRIAL FIBRILLATION: ICD-10-CM

## 2019-02-13 PROCEDURE — 93010 ELECTROCARDIOGRAM REPORT: CPT | Mod: S$PBB,,, | Performed by: INTERNAL MEDICINE

## 2019-02-13 PROCEDURE — 93005 ELECTROCARDIOGRAM TRACING: CPT | Mod: PBBFAC | Performed by: INTERNAL MEDICINE

## 2019-02-13 PROCEDURE — 93010 RHYTHM STRIP: ICD-10-PCS | Mod: S$PBB,,, | Performed by: INTERNAL MEDICINE

## 2019-03-01 NOTE — PROGRESS NOTES
Mr. Cherry is a patient of Dr. Blake and was last seen in clinic 12/19/2018.      Subjective:   Patient ID:  Erasmo Cherry is a 73 y.o. male who presents for follow-up of Atrial Fibrillation  .     HPI:    Mr. Cherry is a 73 y.o. male with HTN, DM, atrial fibrillation, colon polyps, nonischemic cardiomyopathy, RBBB here for follow up after cardioversion.    Background:    Presented to PCP for routine visit, ECG 12/14/2016 revealed atrial fibrillation, rate controlled. He was asymptomatic.  At f/u he remained in atrial fibrillation with controlled ventricular response.  He reported feeling fine.  Denies palpitations, shortness of breath, fatigue, syncope.  He is the  of [facility] [employer] at [facility].  Of note has undergone liver biopsy x 2, diagnosed with WHEAT.  We initiated Eliquis 5 mg BID.  Echo 1/19/2017 EF 40-45% GENARO 46.89  I recommended DCCV.  He deferred.  Pet stress 3/30/2017 negative for ischemia.  Ultimately proceeded with DCCV 7/25/2018.  Toprol added. One week later was back out of rhythm.  As EF has not improved, I endorsed an attempt at rhythm control and re-assessing.  Options are PVI or amiodarone. He prefers, and I agree (short term) with the latter.    Plan was for Amiodarone 200 mg daily. DCCV in one month.  Defer NEVAEH if remains compliant with Eliquis. If maintains nsr for two months after, repeat echo.  If EF improved, I would encourage PVI at that point.     Update (03/06/2019):    Underwent successful DCCV on 2/6/2018.     Today he says he did not feel any symptom change in SR vs AF. He is not unsurprised to be in AF. Mr. Cherry denies chest pain with exertion or at rest, palpitations, SOB, WILLETT, dizziness, or syncope. No recent CHF exacerbations.    He is currently taking eliquis 5mg BID for stroke prophylaxis and denies significant bleeding episodes. He is currently being treated with amiodarone 200mg daily for rhythm control and metoprolol succinate 25mg daily for HR control.   Kidney function is stable, with a creatinine of 1.2 on 2/1/2019.    I have personally reviewed the patient's EKG today, which shows AF with slow ventricular response at 46bpm.    Recent Cardiac Tests:    2D Echo (12/3/2018):  · Mildly decreased left ventricular systolic function. The estimated ejection fraction is 40%.   · Normal right ventricular systolic function.  · Left ventricular diastolic dysfunction.  · Trace aortic regurgitation.  · Mild mitral regurgitation.  · Mild tricuspid regurgitation.  · Trace pulmonic regurgitation.  · The estimated PA systolic pressure is 31.43 mm Hg  · Intermediate central venous pressure (8 mm Hg).    Current Outpatient Medications   Medication Sig    amiodarone (PACERONE) 200 MG Tab Take 1 tablet (200 mg total) by mouth once daily.    atorvastatin (LIPITOR) 10 MG tablet TAKE 1 TABLET DAILY    ELIQUIS 5 mg Tab TAKE 1 TABLET TWICE A DAY    FREESTYLE LITE STRIPS Strp USE TO TEST TWICE A DAY AS NEEDED    glipiZIDE (GLUCOTROL) 2.5 MG TR24 TAKE 1 TABLET THREE TIMES A DAY WITH MEALS    JARDIANCE 25 mg Tab TAKE 1 TABLET ONCE DAILY    levothyroxine (SYNTHROID) 75 MCG tablet Take 1 tablet (75 mcg total) by mouth before breakfast.    lisinopril (PRINIVIL,ZESTRIL) 20 MG tablet TAKE 1 TABLET DAILY    metFORMIN (GLUCOPHAGE-XR) 750 MG 24 hr tablet TAKE 1 TABLET TWICE A DAY WITH MEALS    metoprolol succinate (TOPROL-XL) 25 MG 24 hr tablet Take 1 tablet (25 mg total) by mouth once daily.    VITAMIN D2 50,000 unit capsule TAKE 1 CAPSULE EVERY 7 DAYS    vitamin E 800 UNIT capsule Take 800 Units by mouth once daily.     No current facility-administered medications for this visit.      Facility-Administered Medications Ordered in Other Visits   Medication    sodium chloride 0.9% flush 5 mL     Review of Systems   Constitution: Negative for malaise/fatigue.   Cardiovascular: Negative for chest pain, dyspnea on exertion, irregular heartbeat, leg swelling and palpitations.   Respiratory:  "Negative for shortness of breath.    Hematologic/Lymphatic: Negative for bleeding problem.   Skin: Negative for rash.   Musculoskeletal: Negative for myalgias.   Gastrointestinal: Negative for hematemesis, hematochezia and nausea.   Genitourinary: Negative for hematuria.   Neurological: Negative for light-headedness.   Psychiatric/Behavioral: Negative for altered mental status.   Allergic/Immunologic: Negative for persistent infections.     Objective:        BP (!) 144/70   Pulse (!) 46   Resp (!) 22   Ht 5' 8" (1.727 m)   Wt 92.2 kg (203 lb 4.2 oz)   BMI 30.91 kg/m²     Physical Exam   Constitutional: He is oriented to person, place, and time. He appears well-developed and well-nourished.   HENT:   Head: Normocephalic.   Nose: Nose normal.   Eyes: Pupils are equal, round, and reactive to light.   Cardiovascular: S1 normal and S2 normal. An irregularly irregular rhythm present. Bradycardia present.   No murmur heard.  Pulses:       Radial pulses are 2+ on the right side, and 2+ on the left side.   Pulmonary/Chest: Breath sounds normal. No respiratory distress.   Abdominal: Normal appearance.   Musculoskeletal: Normal range of motion. He exhibits no edema.   Neurological: He is alert and oriented to person, place, and time.   Skin: Skin is warm and dry. No erythema.   Psychiatric: He has a normal mood and affect. His speech is normal and behavior is normal.   Nursing note and vitals reviewed.    Lab Results   Component Value Date     02/01/2019    K 4.5 02/01/2019    BUN 21 02/01/2019    CREATININE 1.2 02/01/2019    ALT 17 12/03/2018    AST 23 12/03/2018    HGB 16.7 02/01/2019    HCT 49.0 02/01/2019    TSH 5.848 (H) 02/01/2019    LDLCALC 51.2 (L) 09/21/2018       Recent Labs   Lab 05/11/16  1537 07/25/18  0732 02/01/19  0705   INR 1.0 1.0 1.0       Assessment:     1. Persistent atrial fibrillation    2. Cardiomyopathy, nonischemic    3. Essential hypertension      Plan:     In summary, Mr. Cherry is a 73 " y.o. male with HTN, DM, atrial fibrillation, colon polyps, nonischemic cardiomyopathy, RBBB here for follow up after cardioversion.  He is in AF now despite DCCV with amiodarone. He is asymptomatic. However, he has a history of cardiomyopathy with a negative stress test. We discussed options. His AF is rate controlled. If his cardiomyopathy persists, then ablation is a reasonable choice.  Will obtain updated echo. He is willing to proceed with PVI if his EF remains depressed. Will confirm plan with Dr. Blake. In the meantime, patient to continue amiodarone and eliquis.    Echo.  Continue current medications.  Will follow up pending test results.    *A copy of this note has been sent to Dr. Blake*    Follow-up as scheduled.    ------------------------------------------------------------------    WILMA Alanis, NP-C  [facility]

## 2019-03-04 ENCOUNTER — PATIENT MESSAGE (OUTPATIENT)
Dept: SLEEP MEDICINE | Facility: CLINIC | Age: 74
End: 2019-03-04

## 2019-03-06 ENCOUNTER — OFFICE VISIT (OUTPATIENT)
Dept: ELECTROPHYSIOLOGY | Facility: CLINIC | Age: 74
End: 2019-03-06
Payer: MEDICARE

## 2019-03-06 ENCOUNTER — HOSPITAL ENCOUNTER (OUTPATIENT)
Dept: CARDIOLOGY | Facility: CLINIC | Age: 74
Discharge: HOME | End: 2019-03-06
Payer: MEDICARE

## 2019-03-06 ENCOUNTER — TELEPHONE (OUTPATIENT)
Dept: ELECTROPHYSIOLOGY | Facility: CLINIC | Age: 74
End: 2019-03-06

## 2019-03-06 VITALS
BODY MASS INDEX: 30.8 KG/M2 | WEIGHT: 203.25 LBS | RESPIRATION RATE: 22 BRPM | DIASTOLIC BLOOD PRESSURE: 70 MMHG | HEIGHT: 68 IN | HEART RATE: 46 BPM | SYSTOLIC BLOOD PRESSURE: 144 MMHG

## 2019-03-06 DIAGNOSIS — I48.19 PERSISTENT ATRIAL FIBRILLATION: Primary | ICD-10-CM

## 2019-03-06 DIAGNOSIS — I42.8 CARDIOMYOPATHY, NONISCHEMIC: ICD-10-CM

## 2019-03-06 DIAGNOSIS — I10 ESSENTIAL HYPERTENSION: ICD-10-CM

## 2019-03-06 DIAGNOSIS — I48.91 ATRIAL FIBRILLATION, UNSPECIFIED TYPE: ICD-10-CM

## 2019-03-06 PROCEDURE — 99214 OFFICE O/P EST MOD 30 MIN: CPT | Mod: S$PBB,,, | Performed by: NURSE PRACTITIONER

## 2019-03-06 PROCEDURE — 93005 ELECTROCARDIOGRAM TRACING: CPT | Mod: PBBFAC | Performed by: INTERNAL MEDICINE

## 2019-03-06 PROCEDURE — 99999 PR PBB SHADOW E&M-EST. PATIENT-LVL III: CPT | Mod: PBBFAC,,, | Performed by: NURSE PRACTITIONER

## 2019-03-06 PROCEDURE — 99213 OFFICE O/P EST LOW 20 MIN: CPT | Mod: PBBFAC,25 | Performed by: NURSE PRACTITIONER

## 2019-03-06 PROCEDURE — 93010 RHYTHM STRIP: ICD-10-PCS | Mod: S$PBB,,, | Performed by: INTERNAL MEDICINE

## 2019-03-06 PROCEDURE — 93010 ELECTROCARDIOGRAM REPORT: CPT | Mod: S$PBB,,, | Performed by: INTERNAL MEDICINE

## 2019-03-06 PROCEDURE — 99999 PR PBB SHADOW E&M-EST. PATIENT-LVL III: ICD-10-PCS | Mod: PBBFAC,,, | Performed by: NURSE PRACTITIONER

## 2019-03-06 PROCEDURE — 99214 PR OFFICE/OUTPT VISIT, EST, LEVL IV, 30-39 MIN: ICD-10-PCS | Mod: S$PBB,,, | Performed by: NURSE PRACTITIONER

## 2019-03-06 NOTE — Clinical Note
Back in asymptomatic AF despite amiodarone. He is willing to proceed with PVI if he continues to have a cardiomyopathy.

## 2019-03-13 ENCOUNTER — PATIENT MESSAGE (OUTPATIENT)
Dept: SLEEP MEDICINE | Facility: CLINIC | Age: 74
End: 2019-03-13

## 2019-03-13 DIAGNOSIS — G47.33 OBSTRUCTIVE SLEEP APNEA: Primary | ICD-10-CM

## 2019-03-15 ENCOUNTER — TELEPHONE (OUTPATIENT)
Dept: SLEEP MEDICINE | Facility: OTHER | Age: 74
End: 2019-03-15

## 2019-03-19 ENCOUNTER — TELEPHONE (OUTPATIENT)
Dept: SLEEP MEDICINE | Facility: OTHER | Age: 74
End: 2019-03-19

## 2019-03-21 ENCOUNTER — PATIENT MESSAGE (OUTPATIENT)
Dept: SLEEP MEDICINE | Facility: CLINIC | Age: 74
End: 2019-03-21

## 2019-03-23 ENCOUNTER — HOSPITAL ENCOUNTER (OUTPATIENT)
Dept: SLEEP MEDICINE | Facility: OTHER | Age: 74
Discharge: HOME | End: 2019-03-23
Attending: NURSE PRACTITIONER
Payer: MEDICARE

## 2019-03-23 DIAGNOSIS — G47.33 OBSTRUCTIVE SLEEP APNEA: ICD-10-CM

## 2019-03-23 PROCEDURE — 95810 POLYSOM 6/> YRS 4/> PARAM: CPT | Mod: 26,,, | Performed by: PSYCHIATRY & NEUROLOGY

## 2019-03-23 PROCEDURE — 95810 PR POLYSOMNOGRAPHY, 4 OR MORE: ICD-10-PCS | Mod: 26,,, | Performed by: PSYCHIATRY & NEUROLOGY

## 2019-03-23 PROCEDURE — 95810 POLYSOM 6/> YRS 4/> PARAM: CPT

## 2019-03-24 NOTE — PROGRESS NOTES
"Baseline Requalification PSG study was performed on Erasmo Cherry. The following was explained to the pt either prior to or during the set-up procedure: the set-up procedure (what each wire is for), time to bed, time of waking in morning, reasons tech might be needing to enter room during the night, and the possibility of meeting criteria for a split -night study with CPAP mask. It was also explained to the pt that the physician will analyze the study and the results will be sent to their PCP. A "Thank You" letter was also given to the pt upon leaving the lab that thoroughly explains the next steps in communication regarding this study and of treatment, if needed.     EKG:  apparent Atrial Fibrillation?, Bradycardia?, V. Couplet?    Difficulties:  TIR Checked chin lead connections on pt and headbox; TIR Reinsert/adjust nasal sensors due to poor signals    Pt did not meet criteria for a split-night study.  "

## 2019-03-25 ENCOUNTER — HOSPITAL ENCOUNTER (OUTPATIENT)
Dept: CARDIOLOGY | Facility: CLINIC | Age: 74
Discharge: HOME | End: 2019-03-25
Attending: NURSE PRACTITIONER
Payer: MEDICARE

## 2019-03-25 ENCOUNTER — TELEPHONE (OUTPATIENT)
Dept: ELECTROPHYSIOLOGY | Facility: CLINIC | Age: 74
End: 2019-03-25

## 2019-03-25 VITALS
WEIGHT: 198 LBS | HEART RATE: 60 BPM | DIASTOLIC BLOOD PRESSURE: 68 MMHG | SYSTOLIC BLOOD PRESSURE: 120 MMHG | BODY MASS INDEX: 30.01 KG/M2 | HEIGHT: 68 IN

## 2019-03-25 DIAGNOSIS — I48.19 PERSISTENT ATRIAL FIBRILLATION: Primary | ICD-10-CM

## 2019-03-25 DIAGNOSIS — I42.8 CARDIOMYOPATHY, NONISCHEMIC: ICD-10-CM

## 2019-03-25 DIAGNOSIS — I48.19 PERSISTENT ATRIAL FIBRILLATION: ICD-10-CM

## 2019-03-25 LAB
ASCENDING AORTA: 3.54 CM
AV INDEX (PROSTH): 0.46
AV MEAN GRADIENT: 6.4 MMHG
AV PEAK GRADIENT: 12.82 MMHG
AV VALVE AREA: 1.55 CM2
AV VELOCITY RATIO: 0.47
BSA FOR ECHO PROCEDURE: 2.08 M2
CV ECHO LV RWT: 0.35 CM
DOP CALC AO PEAK VEL: 1.79 M/S
DOP CALC AO VTI: 32.02 CM
DOP CALC LVOT AREA: 3.36 CM2
DOP CALC LVOT DIAMETER: 2.07 CM
DOP CALC LVOT PEAK VEL: 0.85 M/S
DOP CALC LVOT STROKE VOLUME: 49.48 CM3
DOP CALCLVOT PEAK VEL VTI: 14.71 CM
E WAVE DECELERATION TIME: 272.92 MSEC
E/A RATIO: 1.58
E/E' RATIO: 6.3
ECHO LV POSTERIOR WALL: 0.89 CM (ref 0.6–1.1)
FRACTIONAL SHORTENING: 31 % (ref 28–44)
INTERVENTRICULAR SEPTUM: 0.96 CM (ref 0.6–1.1)
LA MAJOR: 6.75 CM
LA MINOR: 6.54 CM
LA WIDTH: 4.17 CM
LEFT ATRIUM SIZE: 4.02 CM
LEFT ATRIUM VOLUME INDEX: 46.5 ML/M2
LEFT ATRIUM VOLUME: 94.66 CM3
LEFT INTERNAL DIMENSION IN SYSTOLE: 3.53 CM (ref 2.1–4)
LEFT VENTRICLE DIASTOLIC VOLUME INDEX: 61.92 ML/M2
LEFT VENTRICLE DIASTOLIC VOLUME: 126.02 ML
LEFT VENTRICLE MASS INDEX: 84.4 G/M2
LEFT VENTRICLE SYSTOLIC VOLUME INDEX: 25.5 ML/M2
LEFT VENTRICLE SYSTOLIC VOLUME: 51.93 ML
LEFT VENTRICULAR INTERNAL DIMENSION IN DIASTOLE: 5.14 CM (ref 3.5–6)
LEFT VENTRICULAR MASS: 171.77 G
LV LATERAL E/E' RATIO: 4.85
LV SEPTAL E/E' RATIO: 9
MV PEAK A VEL: 0.4 M/S
MV PEAK E VEL: 0.63 M/S
PISA TR MAX VEL: 2.56 M/S
RA MAJOR: 5.93 CM
RA PRESSURE: 15 MMHG
RA WIDTH: 4.69 CM
RIGHT VENTRICULAR END-DIASTOLIC DIMENSION: 4.63 CM
SINUS: 3.01 CM
STJ: 2.82 CM
TDI LATERAL: 0.13
TDI SEPTAL: 0.07
TDI: 0.1
TR MAX PG: 26.21 MMHG
TRICUSPID ANNULAR PLANE SYSTOLIC EXCURSION: 1.68 CM
TV REST PULMONARY ARTERY PRESSURE: 41 MMHG

## 2019-03-25 PROCEDURE — 93306 TRANSTHORACIC ECHO (TTE) COMPLETE (CUPID ONLY): ICD-10-PCS | Mod: 26,S$PBB,, | Performed by: INTERNAL MEDICINE

## 2019-03-25 PROCEDURE — 93306 TTE W/DOPPLER COMPLETE: CPT | Mod: PBBFAC | Performed by: INTERNAL MEDICINE

## 2019-04-01 ENCOUNTER — CLINICAL SUPPORT (OUTPATIENT)
Dept: CARDIOLOGY | Facility: HOSPITAL | Age: 74
End: 2019-04-01
Attending: NURSE PRACTITIONER
Payer: MEDICARE

## 2019-04-01 DIAGNOSIS — I48.19 PERSISTENT ATRIAL FIBRILLATION: ICD-10-CM

## 2019-04-01 PROCEDURE — 93227 XTRNL ECG REC<48 HR R&I: CPT | Mod: ,,, | Performed by: INTERNAL MEDICINE

## 2019-04-01 PROCEDURE — 93226 XTRNL ECG REC<48 HR SCAN A/R: CPT

## 2019-04-01 PROCEDURE — 93227 HOLTER MONITOR - 24 HOUR (CUPID ONLY): ICD-10-PCS | Mod: ,,, | Performed by: INTERNAL MEDICINE

## 2019-04-02 ENCOUNTER — PATIENT MESSAGE (OUTPATIENT)
Dept: SLEEP MEDICINE | Facility: CLINIC | Age: 74
End: 2019-04-02

## 2019-04-02 DIAGNOSIS — G47.33 OSA (OBSTRUCTIVE SLEEP APNEA): Primary | ICD-10-CM

## 2019-04-02 NOTE — TELEPHONE ENCOUNTER
Requal PSG 03/23/2019 203 lb. The overall AHI was 11.6 with an oxygen jose of 88.0%. The supine AHI was 32.1 and the REM AHI was 18.2.     APAP ordered

## 2019-04-02 NOTE — PROCEDURES
"Dear Referring Provider,    The sleep study that you ordered is complete. You have ordered sleep LAB services to perform the sleep study for Erasmo Cherry.     Please find Sleep Study result in "Chart Review" under the "Media tab."     As the ordering provider, you are responsible for reviewing the results and implementing a treatment plan with your patient. If you need a Sleep Medicine provider to explain the sleep study findings and arrange treatment for the patient, please refer patient for consultation to our Sleep Clinic via [facility] with Ambulatory Consult Sleep.    To do that please place an order for an "Ambulatory Consult Sleep" - it will go to our clinic work queue for our Medical Assistant to contact the patient for an appointment.     For any questions, please contact our clinic staff at [phone] to talk to clinical staff.      "

## 2019-04-10 LAB
OHS CV EVENT MONITOR DAY: 0
OHS CV HOLTER LENGTH DECIMAL HOURS: 24
OHS CV HOLTER LENGTH HOURS: 24
OHS CV HOLTER LENGTH MINUTES: 0

## 2019-04-11 ENCOUNTER — TELEPHONE (OUTPATIENT)
Dept: ELECTROPHYSIOLOGY | Facility: CLINIC | Age: 74
End: 2019-04-11

## 2019-04-11 DIAGNOSIS — I48.19 PERSISTENT ATRIAL FIBRILLATION: Primary | ICD-10-CM

## 2019-05-30 ENCOUNTER — LAB VISIT (OUTPATIENT)
Dept: LAB | Facility: OTHER | Age: 74
End: 2019-05-30
Attending: INTERNAL MEDICINE
Payer: MEDICARE

## 2019-05-30 DIAGNOSIS — Z12.5 SCREENING FOR PROSTATE CANCER: ICD-10-CM

## 2019-05-30 LAB — COMPLEXED PSA SERPL-MCNC: <0.01 NG/ML (ref 0–4)

## 2019-05-30 PROCEDURE — 84153 ASSAY OF PSA TOTAL: CPT

## 2019-05-30 PROCEDURE — 36415 COLL VENOUS BLD VENIPUNCTURE: CPT

## 2019-07-02 RX ORDER — METOPROLOL SUCCINATE 25 MG/1
TABLET, EXTENDED RELEASE ORAL
Qty: 90 TABLET | Refills: 3 | OUTPATIENT
Start: 2019-07-02

## 2019-08-15 DIAGNOSIS — I48.0 PAF (PAROXYSMAL ATRIAL FIBRILLATION): Primary | ICD-10-CM

## 2019-08-15 RX ORDER — METOPROLOL SUCCINATE 25 MG/1
25 TABLET, EXTENDED RELEASE ORAL DAILY
Qty: 90 TABLET | Refills: 3 | OUTPATIENT
Start: 2019-08-15

## 2019-08-15 RX ORDER — METOPROLOL SUCCINATE 25 MG/1
25 TABLET, EXTENDED RELEASE ORAL DAILY
Qty: 30 TABLET | Refills: 11 | Status: SHIPPED | OUTPATIENT
Start: 2019-08-15 | End: 2020-08-04

## 2019-08-28 ENCOUNTER — CLINICAL SUPPORT (OUTPATIENT)
Dept: CARDIOLOGY | Facility: HOSPITAL | Age: 74
End: 2019-08-28
Attending: NURSE PRACTITIONER
Payer: MEDICARE

## 2019-08-28 DIAGNOSIS — I48.19 PERSISTENT ATRIAL FIBRILLATION: ICD-10-CM

## 2019-08-28 PROCEDURE — 93227 XTRNL ECG REC<48 HR R&I: CPT | Mod: ,,, | Performed by: INTERNAL MEDICINE

## 2019-08-28 PROCEDURE — 93227 HOLTER MONITOR - 24 HOUR (CUPID ONLY): ICD-10-PCS | Mod: ,,, | Performed by: INTERNAL MEDICINE

## 2019-08-28 PROCEDURE — 93226 XTRNL ECG REC<48 HR SCAN A/R: CPT

## 2019-09-09 ENCOUNTER — LAB VISIT (OUTPATIENT)
Dept: LAB | Facility: OTHER | Age: 74
End: 2019-09-09
Attending: INTERNAL MEDICINE
Payer: MEDICARE

## 2019-09-09 DIAGNOSIS — I10 ESSENTIAL HYPERTENSION: ICD-10-CM

## 2019-09-09 DIAGNOSIS — K76.0 NONALCOHOLIC FATTY LIVER DISEASE: ICD-10-CM

## 2019-09-09 DIAGNOSIS — E13.9 DIABETES MELLITUS DUE TO ABNORMAL INSULIN: ICD-10-CM

## 2019-09-09 DIAGNOSIS — I48.19 PERSISTENT ATRIAL FIBRILLATION: ICD-10-CM

## 2019-09-09 LAB
ALBUMIN SERPL BCP-MCNC: 4.5 G/DL (ref 3.5–5.2)
ALP SERPL-CCNC: 55 U/L (ref 55–135)
ALT SERPL W/O P-5'-P-CCNC: 21 U/L (ref 10–44)
ANION GAP SERPL CALC-SCNC: 13 MMOL/L (ref 8–16)
AST SERPL-CCNC: 24 U/L (ref 10–40)
BILIRUB SERPL-MCNC: 3 MG/DL (ref 0.1–1)
BUN SERPL-MCNC: 16 MG/DL (ref 8–23)
CALCIUM SERPL-MCNC: 10.2 MG/DL (ref 8.7–10.5)
CHLORIDE SERPL-SCNC: 105 MMOL/L (ref 95–110)
CHOLEST SERPL-MCNC: 106 MG/DL (ref 120–199)
CHOLEST/HDLC SERPL: 2.3 {RATIO} (ref 2–5)
CO2 SERPL-SCNC: 23 MMOL/L (ref 23–29)
CREAT SERPL-MCNC: 1 MG/DL (ref 0.5–1.4)
EST. GFR  (AFRICAN AMERICAN): >60 ML/MIN/1.73 M^2
EST. GFR  (NON AFRICAN AMERICAN): >60 ML/MIN/1.73 M^2
ESTIMATED AVG GLUCOSE: 151 MG/DL (ref 68–131)
GLUCOSE SERPL-MCNC: 96 MG/DL (ref 70–110)
HBA1C MFR BLD HPLC: 6.9 % (ref 4–5.6)
HDLC SERPL-MCNC: 47 MG/DL (ref 40–75)
HDLC SERPL: 44.3 % (ref 20–50)
LDLC SERPL CALC-MCNC: 44.8 MG/DL (ref 63–159)
NONHDLC SERPL-MCNC: 59 MG/DL
POTASSIUM SERPL-SCNC: 4 MMOL/L (ref 3.5–5.1)
PROT SERPL-MCNC: 7.3 G/DL (ref 6–8.4)
SODIUM SERPL-SCNC: 141 MMOL/L (ref 136–145)
T4 FREE SERPL-MCNC: 1.21 NG/DL (ref 0.71–1.51)
TRIGL SERPL-MCNC: 71 MG/DL (ref 30–150)
TSH SERPL DL<=0.005 MIU/L-ACNC: 1.83 UIU/ML (ref 0.4–4)

## 2019-09-09 PROCEDURE — 84439 ASSAY OF FREE THYROXINE: CPT

## 2019-09-09 PROCEDURE — 80061 LIPID PANEL: CPT

## 2019-09-09 PROCEDURE — 36415 COLL VENOUS BLD VENIPUNCTURE: CPT

## 2019-09-09 PROCEDURE — 80053 COMPREHEN METABOLIC PANEL: CPT

## 2019-09-09 PROCEDURE — 83036 HEMOGLOBIN GLYCOSYLATED A1C: CPT

## 2019-09-09 PROCEDURE — 84443 ASSAY THYROID STIM HORMONE: CPT

## 2019-09-10 NOTE — PROGRESS NOTES
Subjective:    Patient ID:  Erasmo Cherry is a 74 y.o. male who presents for follow-up of Atrial Fibrillation      HPI 73 yo male with HTN, DM, atrial fibrillation, colon polyps, nonischemic cardiomyopathy, RBBB.     Background:  He is the  of [facility] at [facility].    Presented to PCP for routine visit, ECG 12/14/2016 revealed atrial fibrillation, rate controlled. He was asymptomatic.  At f/u he remained in atrial fibrillation with controlled ventricular response.  He reported feeling fine.  Denies palpitations, shortness of breath, fatigue, syncope.    Of note has undergone liver biopsy x 2, diagnosed with WHEAT.  We initiated Eliquis 5 mg BID.  Echo 1/19/2017 EF 40-45% GENARO 46.89  I recommended DCCV.  He deferred.  Pet stress 3/30/2017 negative for ischemia.  Ultimately proceeded with DCCV 7/25/2018.  Toprol added. One week later was back out of rhythm.  As EF has not improved, I endorsed an attempt at rhythm control and re-assessing.  Options are PVI or amiodarone. He prefers, and I agree (short term) with the latter.     Plan was for Amiodarone 200 mg daily. DCCV in one month.    Underwent successful DCCV on 2/6/2018.   Was back out of rhythm at follow-up.  Amiodarone discontinued.    Update:  Continues to feel well.  Has resumed biking.  Denies dyspnea on exertion.  Echo 3/25/19 EF 55% PASP 41mmHg  Holter 8/28/19 AF with average ventricular response of 56 bpm    Review of Systems   Constitution: Negative. Negative for malaise/fatigue.   Cardiovascular: Negative for chest pain, dyspnea on exertion, irregular heartbeat, leg swelling, near-syncope, orthopnea, palpitations, paroxysmal nocturnal dyspnea and syncope.   Respiratory: Negative for cough and shortness of breath.    Neurological: Negative for dizziness, light-headedness and weakness.   All other systems reviewed and are negative.       Objective:    Physical Exam   Constitutional: He is oriented to person, place, and time. He appears  well-developed and well-nourished.   Eyes: Conjunctivae are normal. No scleral icterus.   Neck: No JVD present. No tracheal deviation present.   Cardiovascular: Normal rate and normal heart sounds. An irregularly irregular rhythm present. PMI is not displaced.   Pulmonary/Chest: Effort normal and breath sounds normal. No respiratory distress.   Abdominal: Soft. There is no hepatosplenomegaly. There is no tenderness.   Musculoskeletal: He exhibits no edema (lower extremity) or tenderness.   Neurological: He is alert and oriented to person, place, and time.   Skin: Skin is warm and dry. No rash noted.   Psychiatric: He has a normal mood and affect. His behavior is normal.         Assessment:       1. Persistent atrial fibrillation    2. Cardiomyopathy, nonischemic    3. Essential hypertension    4. RBBB    5. Prostate CA    6. Diabetes mellitus due to abnormal insulin    7. Obstructive sleep apnea         Plan:           Doing great.  Remains rate controlled, asymptomatic.  EF is normal.  Continue current medications.  F/u in one year.

## 2019-09-11 ENCOUNTER — HOSPITAL ENCOUNTER (OUTPATIENT)
Dept: CARDIOLOGY | Facility: CLINIC | Age: 74
Discharge: HOME | End: 2019-09-11
Payer: MEDICARE

## 2019-09-11 ENCOUNTER — OFFICE VISIT (OUTPATIENT)
Dept: ELECTROPHYSIOLOGY | Facility: CLINIC | Age: 74
End: 2019-09-11
Payer: MEDICARE

## 2019-09-11 VITALS
BODY MASS INDEX: 28.34 KG/M2 | HEIGHT: 68 IN | HEART RATE: 58 BPM | DIASTOLIC BLOOD PRESSURE: 71 MMHG | SYSTOLIC BLOOD PRESSURE: 108 MMHG | WEIGHT: 187 LBS

## 2019-09-11 DIAGNOSIS — I42.8 CARDIOMYOPATHY, NONISCHEMIC: ICD-10-CM

## 2019-09-11 DIAGNOSIS — C61 PROSTATE CA: ICD-10-CM

## 2019-09-11 DIAGNOSIS — I45.10 RBBB: ICD-10-CM

## 2019-09-11 DIAGNOSIS — E13.9 DIABETES MELLITUS DUE TO ABNORMAL INSULIN: ICD-10-CM

## 2019-09-11 DIAGNOSIS — I48.19 PERSISTENT ATRIAL FIBRILLATION: Primary | ICD-10-CM

## 2019-09-11 DIAGNOSIS — G47.33 OBSTRUCTIVE SLEEP APNEA: ICD-10-CM

## 2019-09-11 DIAGNOSIS — I10 ESSENTIAL HYPERTENSION: ICD-10-CM

## 2019-09-11 DIAGNOSIS — I48.91 ATRIAL FIBRILLATION, UNSPECIFIED TYPE: ICD-10-CM

## 2019-09-11 PROCEDURE — 99214 PR OFFICE/OUTPT VISIT, EST, LEVL IV, 30-39 MIN: ICD-10-PCS | Mod: S$PBB,,, | Performed by: INTERNAL MEDICINE

## 2019-09-11 PROCEDURE — 99999 PR PBB SHADOW E&M-EST. PATIENT-LVL III: CPT | Mod: PBBFAC,,, | Performed by: INTERNAL MEDICINE

## 2019-09-11 PROCEDURE — 99999 PR PBB SHADOW E&M-EST. PATIENT-LVL III: ICD-10-PCS | Mod: PBBFAC,,, | Performed by: INTERNAL MEDICINE

## 2019-09-11 PROCEDURE — 93010 ELECTROCARDIOGRAM REPORT: CPT | Mod: S$PBB,,, | Performed by: INTERNAL MEDICINE

## 2019-09-11 PROCEDURE — 99214 OFFICE O/P EST MOD 30 MIN: CPT | Mod: S$PBB,,, | Performed by: INTERNAL MEDICINE

## 2019-09-11 PROCEDURE — 99213 OFFICE O/P EST LOW 20 MIN: CPT | Mod: PBBFAC,25 | Performed by: INTERNAL MEDICINE

## 2019-09-11 PROCEDURE — 93010 RHYTHM STRIP: ICD-10-PCS | Mod: S$PBB,,, | Performed by: INTERNAL MEDICINE

## 2019-09-11 PROCEDURE — 93005 ELECTROCARDIOGRAM TRACING: CPT | Mod: PBBFAC | Performed by: INTERNAL MEDICINE

## 2020-10-22 ENCOUNTER — PATIENT MESSAGE (OUTPATIENT)
Dept: SLEEP MEDICINE | Facility: CLINIC | Age: 75
End: 2020-10-22

## 2020-10-28 ENCOUNTER — TELEPHONE (OUTPATIENT)
Dept: SLEEP MEDICINE | Facility: CLINIC | Age: 75
End: 2020-10-28

## 2020-10-28 NOTE — TELEPHONE ENCOUNTER
----- Message from Angle Hagen CRT sent at 10/27/2020  4:56 PM CDT -----  This message came from [employer]'s Manager. He will address his staff on this matter.      PT placed the order on 9/15 but it was not eligible to ship until 10/23.  Since PT has Medicare, we are required to confirm the order with the PT if it has been past 14 days which is why we were calling.  I will make sure the agents are stating who they are, where they are from, and why they are calling at the beginning of every call.           Thanks -    Nick Gmoez    [facility]     [phone]  ----- Message -----  From: Jeferson Riggs NP  Sent: 10/25/2020  10:19 AM CDT  To: GALINDO Elena,     A patient of mine sent me this concerning message. When you have a moment, would you mind addressing it? I'm not sure if it is an option to simply opt for email/text only for re-supply for him, but also this is one of the first complaints I've heard since we switched over to the new third party company. Has this been an issue?     Pt message:    ----  Recieved a call yesterday evening and I answered that I was Dr. Cherry - He asked for Erasmo without introducing himself and I told him my first name was Erasmo.  He then asked for my birthdate which I did not give him because I have recently had SPAM calls.  I asked him why he was calling and he said it was concerning my order.  I asked - ordering what?  He did not answer and at the end he did say [facility].  I told him I would verify with [facility].    A few weeks ago I recieved an email/text about placing the order for my CPAP equipment, but was then told I was not elgible due to the time needed between orders.  Something odd going on here.  Subject:  Possible CPAP supplies     I prefer to place the CPAP equipment order via text or email.  Especially if anyone that calls does not identify themself.     Can you put me in touch with the individuals that manage CPAP  supplies.  Thank You.    ----          -sv

## 2020-10-28 NOTE — TELEPHONE ENCOUNTER
----- Message from Angle Hagen CRT sent at 10/27/2020  3:52 PM CDT -----  Thanks Elizabeth. I will bring this to [employer]'s manger and ask him to address this and straighten out this matter.   ----- Message -----  From: Jeferson Riggs NP  Sent: 10/25/2020  10:19 AM CDT  To: GALINDO Elena,     A patient of mine sent me this concerning message. When you have a moment, would you mind addressing it? I'm not sure if it is an option to simply opt for email/text only for re-supply for him, but also this is one of the first complaints I've heard since we switched over to the new third party company. Has this been an issue?     Pt message:    ----  Recieved a call yesterday evening and I answered that I was Dr. Cherry - He asked for Erasmo without introducing himself and I told him my first name was Erasmo.  He then asked for my birthdate which I did not give him because I have recently had SPAM calls.  I asked him why he was calling and he said it was concerning my order.  I asked - ordering what?  He did not answer and at the end he did say [facility].  I told him I would verify with [facility].    A few weeks ago I recieved an email/text about placing the order for my CPAP equipment, but was then told I was not elgible due to the time needed between orders.  Something odd going on here.  Subject:  Possible CPAP supplies     I prefer to place the CPAP equipment order via text or email.  Especially if anyone that calls does not identify themself.     Can you put me in touch with the individuals that manage CPAP supplies.  Thank You.    ----          -sv

## 2020-11-12 PROBLEM — E11.65 TYPE 2 DIABETES MELLITUS WITH HYPERGLYCEMIA, WITHOUT LONG-TERM CURRENT USE OF INSULIN: Status: ACTIVE | Noted: 2020-11-12

## 2020-11-13 ENCOUNTER — TELEPHONE (OUTPATIENT)
Dept: ELECTROPHYSIOLOGY | Facility: CLINIC | Age: 75
End: 2020-11-13

## 2020-11-13 NOTE — TELEPHONE ENCOUNTER
Spoke with pt to r/s appt which was scheduled incorrectly.  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

## 2020-11-24 ENCOUNTER — PES CALL (OUTPATIENT)
Dept: ADMINISTRATIVE | Facility: CLINIC | Age: 75
End: 2020-11-24

## 2020-12-09 ENCOUNTER — OFFICE VISIT (OUTPATIENT)
Dept: PODIATRY | Facility: CLINIC | Age: 75
End: 2020-12-09
Payer: MEDICARE

## 2020-12-09 VITALS
HEART RATE: 56 BPM | WEIGHT: 197 LBS | DIASTOLIC BLOOD PRESSURE: 72 MMHG | SYSTOLIC BLOOD PRESSURE: 176 MMHG | BODY MASS INDEX: 30.92 KG/M2 | HEIGHT: 67 IN

## 2020-12-09 DIAGNOSIS — E11.42 TYPE 2 DIABETES MELLITUS WITH DIABETIC POLYNEUROPATHY, WITHOUT LONG-TERM CURRENT USE OF INSULIN: ICD-10-CM

## 2020-12-09 DIAGNOSIS — E11.65 TYPE 2 DIABETES MELLITUS WITH HYPERGLYCEMIA, WITHOUT LONG-TERM CURRENT USE OF INSULIN: Primary | ICD-10-CM

## 2020-12-09 DIAGNOSIS — M20.42 HAMMER TOES OF BOTH FEET: ICD-10-CM

## 2020-12-09 DIAGNOSIS — Z79.01 LONG TERM CURRENT USE OF ANTICOAGULANT THERAPY: ICD-10-CM

## 2020-12-09 DIAGNOSIS — M20.41 HAMMER TOES OF BOTH FEET: ICD-10-CM

## 2020-12-09 PROCEDURE — 99999 PR PBB SHADOW E&M-EST. PATIENT-LVL IV: CPT | Mod: PBBFAC,,, | Performed by: PODIATRIST

## 2020-12-09 PROCEDURE — 99999 PR PBB SHADOW E&M-EST. PATIENT-LVL IV: ICD-10-PCS | Mod: PBBFAC,,, | Performed by: PODIATRIST

## 2020-12-09 PROCEDURE — 99202 PR OFFICE/OUTPT VISIT, NEW, LEVL II, 15-29 MIN: ICD-10-PCS | Mod: S$PBB,,, | Performed by: PODIATRIST

## 2020-12-09 PROCEDURE — 99214 OFFICE O/P EST MOD 30 MIN: CPT | Mod: PBBFAC,PN | Performed by: PODIATRIST

## 2020-12-09 PROCEDURE — 99202 OFFICE O/P NEW SF 15 MIN: CPT | Mod: S$PBB,,, | Performed by: PODIATRIST

## 2020-12-09 NOTE — PROGRESS NOTES
Subjective:      Patient ID: Erasmo Cherry is a 75 y.o. male.    Chief Complaint: Diabetes Mellitus (Dr Chichi Stack MD 11-) and Diabetic Foot Exam    Diabetes, increased risk amputation needing evaluation/management/optomization of foot care.    No current complaints    Chief Complaint   Patient presents with    Diabetes Mellitus     Dr Chichi Stack MD 11-    Diabetic Foot Exam        Review of Systems   Constitution: Negative for chills, diaphoresis, fever, malaise/fatigue and night sweats.   Cardiovascular: Negative for claudication, cyanosis, leg swelling and syncope.   Skin: Negative for color change, dry skin, nail changes, rash, suspicious lesions and unusual hair distribution.   Musculoskeletal: Negative for falls, joint pain, joint swelling, muscle cramps, muscle weakness and stiffness.   Gastrointestinal: Negative for constipation, diarrhea, nausea and vomiting.   Neurological: Positive for paresthesias and sensory change. Negative for brief paralysis, disturbances in coordination, focal weakness, numbness and tremors.           Objective:      Physical Exam  Constitutional:       General: He is not in acute distress.     Appearance: He is well-developed. He is not diaphoretic.   Cardiovascular:      Pulses:           Popliteal pulses are 2+ on the right side and 2+ on the left side.        Dorsalis pedis pulses are 2+ on the right side and 2+ on the left side.        Posterior tibial pulses are 2+ on the right side and 2+ on the left side.      Comments: Capillary refill 3 seconds all toes/distal feet, all toes/both feet warm to touch.      Negative lymphadenopathy bilateral popliteal fossa and tarsal tunnel.      Negavie lower extremity edema bilateral.    Musculoskeletal:      Right ankle: He exhibits normal range of motion, no swelling, no ecchymosis, no deformity, no laceration and normal pulse. Achilles tendon normal. Achilles tendon exhibits no pain, no defect  and normal Ryan's test results.      Comments: Patient has hammertoes of digits    2-5 bilateral               partially reducible without symptom today.    Otherwise, Normal angle, base, station of gait. All ten toes without clubbing, cyanosis, or signs of ischemia.  No pain to palpation bilateral lower extremities.  Range of motion, stability, muscle strength, and muscle tone normal bilateral feet and legs.      Lymphadenopathy:      Lower Body: No right inguinal adenopathy. No left inguinal adenopathy.      Comments: Negative lymphadenopathy bilateral popliteal fossa and tarsal tunnel.    Negative lymphangitic streaking bilateral feet/ankles/legs.   Skin:     General: Skin is warm and dry.      Capillary Refill: Capillary refill takes 2 to 3 seconds.      Coloration: Skin is not pale.      Findings: No abrasion, bruising, burn, ecchymosis, erythema, laceration, lesion or rash.      Nails: There is no clubbing.        Comments:   Skin is normal age and health appropriate color, turgor, texture, and temperature bilateral lower extremities without ulceration, hyperpigmentation, discoloration, masses nodules or cords palpated.  No ecchymosis, erythema, edema, or cardinal signs of infection bilateral lower extremities.    All toenails normal color and trophic qualities.    Neurological:      Mental Status: He is alert and oriented to person, place, and time.      Sensory: No sensory deficit.      Motor: No tremor, atrophy or abnormal muscle tone.      Gait: Gait normal.      Deep Tendon Reflexes:      Reflex Scores:       Patellar reflexes are 2+ on the right side and 2+ on the left side.       Achilles reflexes are 2+ on the right side and 2+ on the left side.     Comments: Negative tinel sign to percussion sural, superficial peroneal, deep peroneal, saphenous, and posterior tibial nerves right and left ankles and feet.    Paresthesias, intermittently bilateral feet with no clearly identified trigger or source.      Psychiatric:         Behavior: Behavior is cooperative.               Assessment:       Encounter Diagnoses   Name Primary?    Type 2 diabetes mellitus with hyperglycemia, without long-term current use of insulin Yes    Type 2 diabetes mellitus with diabetic polyneuropathy, without long-term current use of insulin     Long term current use of anticoagulant therapy     Hammer toes of both feet          Plan:       Erasmo was seen today for diabetes mellitus and diabetic foot exam.    Diagnoses and all orders for this visit:    Type 2 diabetes mellitus with hyperglycemia, without long-term current use of insulin    Type 2 diabetes mellitus with diabetic polyneuropathy, without long-term current use of insulin    Long term current use of anticoagulant therapy    Hammer toes of both feet      I counseled the patient on his conditions, their implications and medical management.        - Shoe inspection. Diabetic Foot Education. Patient reminded of the importance of good nutrition and blood sugar control to help prevent podiatric complications of diabetes. Patient instructed on proper foot hygeine. We discussed wearing proper shoe gear, daily foot inspections, never walking without protective shoe gear, never putting sharp instruments to feet, routine podiatric visits at least annually.      Discussed conservative treatment with shoes of adequate dimensions, material, and style to alleviate symptoms and delay or prevent surgical intervention.    Declines DM shoes/inserts today due to no symptoms or findings.        Follow up in about 1 year (around 12/9/2021).

## 2020-12-09 NOTE — LETTER
December 9, 2020      Chichi Stack MD  [address]  [address]  [facility] [address]           [facility] - Podiatry  [address] [address]  Phone: [phone]  Fax: [phone]          Patient: Erasmo Cherry   MR Number: [id number]   YOB: 1945   Date of Visit: 12/9/2020       Dear Dr. Chichi Stack:    Thank you for referring Erasmo Cherry to me for evaluation. Attached you will find relevant portions of my assessment and plan of care.    If you have questions, please do not hesitate to call me. I look forward to following Erasmo Cherry along with you.    Sincerely,    Antonio Boateng, DPINGE    Enclosure  CC:  No Recipients    If you would like to receive this communication electronically, please contact [email or web address] or [phone] to request more information on [employer] Link access.    For providers and/or their staff who would like to refer a patient to [facility], please contact us through our one-stop-shop provider referral line, [facility], at [phone].    If you feel you have received this communication in error or would no longer like to receive these types of communications, please e-mail [email or web address]

## 2020-12-16 NOTE — PROGRESS NOTES
Mr. Cherry is a patient of Dr. Blake and was last seen in clinic 9/11/2029.      Subjective:   Patient ID:  Erasmo Cherry is a 75 y.o. male who presents for follow-up of Atrial Fibrillation  .     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     Background:    He is the  of [employer] at [facility].    Presented to PCP for routine visit, ECG 12/14/2016 revealed atrial fibrillation, rate controlled. He was asymptomatic.  At f/u he remained in atrial fibrillation with controlled ventricular response.  He reported feeling fine.  Denies palpitations, shortness of breath, fatigue, syncope.    Of note has undergone liver biopsy x 2, diagnosed with WHEAT.  We initiated Eliquis 5 mg BID.  Echo 1/19/2017 EF 40-45% GENARO 46.89  I recommended DCCV.  He deferred.  Pet stress 3/30/2017 negative for ischemia.  Ultimately proceeded with DCCV 7/25/2018.  Toprol added. One week later was back out of rhythm.  As EF has not improved, I endorsed an attempt at rhythm control and re-assessing.  Options are PVI or amiodarone. He prefers, and I agree (short term) with the latter.     Plan was for Amiodarone 200 mg daily. DCCV in one month.    Underwent successful DCCV on 2/6/2018.   Was back out of rhythm at follow-up.  Amiodarone discontinued.    Continues to feel well.  Has resumed biking. Denies dyspnea on exertion.  Echo 3/25/19 EF 55% PASP 41mmHg  Holter 8/28/19 AF with average ventricular response of 56 bpm    Remains rate controlled, asymptomatic. EF is normal.  Continue current medications.  F/u in one year.     Update (12/17/2020):    Today he says he feels well. Rides bike. Did have a fall a few months ago due to mechanical issue on bike. Says he recovered well. Mr. Cherry reports no chest pain with exertion or at rest, palpitations, SOB, WILLETT, dizziness, or syncope.    He is currently taking eliquis 5mg BID for stroke  prophylaxis and denies significant bleeding episodes. He is currently being treated with metoprolol succinate 25mg daily for HR control.  Kidney function is stable, with a creatinine of 1 on 9/9/2019.    I have personally reviewed the patient's EKG today, which shows AF with RBBB at 48bpm. QRS is 124. QTc is 464.    Relevant Cardiac Test Results:    2D Echo (3/25/2019):  · Normal left ventricular systolic function. The estimated ejection fraction is 55%  · No wall motion abnormalities.  · Biatrial enlargement.  · Normal right ventricular systolic function.  · Mild aortic regurgitation.  · Mild tricuspid regurgitation.  · Moderate right ventricular enlargement.  · The estimated PA systolic pressure is 41 mm Hg  · Pulmonary hypertension present.  · Elevated central venous pressure (15 mm Hg).  · Atrial fibrillation observed.     Current Outpatient Medications   Medication Sig    atorvastatin (LIPITOR) 20 MG tablet TAKE 1 TABLET DAILY    blood sugar diagnostic (FREESTYLE LITE STRIPS) Strp USE TO TEST TWICE A DAY AS NEEDED    dulaglutide (TRULICITY) 1.5 mg/0.5 mL pen injector Inject 1.5 mg into the skin once a week. 90 day supply if possible    ELIQUIS 5 mg Tab TAKE 1 TABLET TWICE A DAY    ergocalciferol (VITAMIN D2) 50,000 unit Cap TAKE 1 CAPSULE EVERY 7 DAYS    FREESTYLE AURE 14 DAY READER Misc TEST BLOOD SUGAR BID.    FREESTYLE AURE 14 DAY SENSOR Kit TEST BLOOD SUGAR BID    glipiZIDE (GLUCOTROL) 2.5 MG TR24 TAKE 1 TABLET THREE TIMES A DAY WITH MEALS    JARDIANCE 25 mg Tab TAKE 1 TABLET DAILY    levothyroxine (SYNTHROID) 88 MCG tablet TAKE 1 TABLET DAILY BEFORE BREAKFAST    metFORMIN (GLUCOPHAGE-XR) 750 MG 24 hr tablet TAKE 1 TABLET TWICE A DAY WITH MEALS    TOPROL XL 25 mg 24 hr tablet TAKE 1 TABLET DAILY    vitamin E 800 UNIT capsule Take 800 Units by mouth once daily.    lisinopril (PRINIVIL,ZESTRIL) 20 MG tablet TAKE 1 TABLET DAILY     No current facility-administered medications for this visit.   "    Facility-Administered Medications Ordered in Other Visits   Medication    sodium chloride 0.9% flush 5 mL     Review of Systems   Constitution: Negative for malaise/fatigue.   Cardiovascular: Negative for chest pain, dyspnea on exertion, irregular heartbeat, leg swelling and palpitations.   Respiratory: Negative for shortness of breath.    Hematologic/Lymphatic: Negative for bleeding problem.   Skin: Negative for rash.   Musculoskeletal: Negative for myalgias.   Gastrointestinal: Negative for hematemesis, hematochezia and nausea.   Genitourinary: Negative for hematuria.   Neurological: Negative for light-headedness.   Psychiatric/Behavioral: Negative for altered mental status.   Allergic/Immunologic: Negative for persistent infections.     Objective:        /77   Pulse (!) 46   Ht 5' 8" (1.727 m)   Wt 87.3 kg (192 lb 7.4 oz)   BMI 29.26 kg/m²     Physical Exam   Constitutional: He is oriented to person, place, and time. He appears well-developed and well-nourished.   HENT:   Head: Normocephalic.   Nose: Nose normal.   Eyes: Pupils are equal, round, and reactive to light.   Cardiovascular: An irregularly irregular rhythm present. Bradycardia present.   Pulmonary/Chest: Breath sounds normal. No respiratory distress.   Abdominal: Normal appearance.   Musculoskeletal: Normal range of motion.         General: No edema.   Neurological: He is alert and oriented to person, place, and time.   Skin: Skin is warm and dry. No erythema.   Psychiatric: He has a normal mood and affect. His speech is normal and behavior is normal.   Nursing note and vitals reviewed.    Lab Results   Component Value Date     09/09/2019    K 4.0 09/09/2019    BUN 16 09/09/2019    CREATININE 1.0 09/09/2019    ALT 21 09/09/2019    AST 24 09/09/2019    HGB 16.7 02/01/2019    HCT 49.0 02/01/2019    TSH 1.832 09/09/2019    LDLCALC 44.8 (L) 09/09/2019       Recent Labs   Lab 07/25/18  0732 02/01/19  0705   INR 1.0 1.0     "     Assessment:     1. Persistent atrial fibrillation    2. Cardiomyopathy, nonischemic    3. Essential hypertension    4. Obstructive sleep apnea    5. 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   He is here for annual follow up. He feels well. Ubaldo but denies LH. Will reduce metoprolol. Holter in 1 mo to assess heart rate curve. On eliquis for CVA prophylaxis (CHADSVASc 3).  Denies CHF symptom. Exercises regularly. RTC 1 yr.    Reduce metoprolol to 12.5mg daily  Holter in 1 mo.  RTC 1 yr, sooner if needed.    *A copy of this note has been sent to Dr. Blake*    Follow up in about 1 year (around 12/17/2021).    ------------------------------------------------------------------    WILMA Alanis, NP-C  Cardiac Electrophysiology

## 2020-12-17 ENCOUNTER — OFFICE VISIT (OUTPATIENT)
Dept: ELECTROPHYSIOLOGY | Facility: CLINIC | Age: 75
End: 2020-12-17
Payer: MEDICARE

## 2020-12-17 VITALS
BODY MASS INDEX: 29.17 KG/M2 | HEART RATE: 46 BPM | DIASTOLIC BLOOD PRESSURE: 77 MMHG | WEIGHT: 192.44 LBS | SYSTOLIC BLOOD PRESSURE: 134 MMHG | HEIGHT: 68 IN

## 2020-12-17 DIAGNOSIS — I10 ESSENTIAL HYPERTENSION: ICD-10-CM

## 2020-12-17 DIAGNOSIS — I48.19 PERSISTENT ATRIAL FIBRILLATION: Primary | ICD-10-CM

## 2020-12-17 DIAGNOSIS — I42.8 CARDIOMYOPATHY, NONISCHEMIC: ICD-10-CM

## 2020-12-17 DIAGNOSIS — G47.33 OBSTRUCTIVE SLEEP APNEA: ICD-10-CM

## 2020-12-17 DIAGNOSIS — I49.8 OTHER SPECIFIED CARDIAC ARRHYTHMIAS: Primary | ICD-10-CM

## 2020-12-17 DIAGNOSIS — I49.8 OTHER SPECIFIED CARDIAC ARRHYTHMIAS: ICD-10-CM

## 2020-12-17 DIAGNOSIS — I48.0 PAF (PAROXYSMAL ATRIAL FIBRILLATION): ICD-10-CM

## 2020-12-17 PROCEDURE — 99999 PR PBB SHADOW E&M-EST. PATIENT-LVL IV: CPT | Mod: PBBFAC,,, | Performed by: NURSE PRACTITIONER

## 2020-12-17 PROCEDURE — 93010 RHYTHM STRIP: ICD-10-PCS | Mod: S$PBB,,, | Performed by: INTERNAL MEDICINE

## 2020-12-17 PROCEDURE — 99214 OFFICE O/P EST MOD 30 MIN: CPT | Mod: S$PBB,,, | Performed by: NURSE PRACTITIONER

## 2020-12-17 PROCEDURE — 93010 ELECTROCARDIOGRAM REPORT: CPT | Mod: S$PBB,,, | Performed by: INTERNAL MEDICINE

## 2020-12-17 PROCEDURE — 99214 PR OFFICE/OUTPT VISIT, EST, LEVL IV, 30-39 MIN: ICD-10-PCS | Mod: S$PBB,,, | Performed by: NURSE PRACTITIONER

## 2020-12-17 PROCEDURE — 99999 PR PBB SHADOW E&M-EST. PATIENT-LVL IV: ICD-10-PCS | Mod: PBBFAC,,, | Performed by: NURSE PRACTITIONER

## 2020-12-17 PROCEDURE — 93005 ELECTROCARDIOGRAM TRACING: CPT | Mod: PBBFAC | Performed by: INTERNAL MEDICINE

## 2020-12-17 PROCEDURE — 99214 OFFICE O/P EST MOD 30 MIN: CPT | Mod: PBBFAC | Performed by: NURSE PRACTITIONER

## 2020-12-17 RX ORDER — METOPROLOL SUCCINATE 25 MG/1
12.5 TABLET, EXTENDED RELEASE ORAL DAILY
Qty: 30 TABLET | Refills: 11
Start: 2020-12-17 | End: 2021-01-13 | Stop reason: SDUPTHER

## 2020-12-17 NOTE — LETTER
December 17, 2020      Chichi Stack MD  [address]  [address]  [facility] [address]           [id number]  [address]  [place] [address]  Phone: [phone]  Fax: [phone]          Patient: Erasmo Cherry   MR Number: [id number]   YOB: 1945   Date of Visit: 12/17/2020       Dear Dr. Chichi Stack:    Thank you for referring Erasmo Cherry to me for evaluation. Attached you will find relevant portions of my assessment and plan of care.    If you have questions, please do not hesitate to call me. I look forward to following Erasmo Cherry along with you.    Sincerely,    Maxine Barrera, NP    Enclosure  CC:  No Recipients    If you would like to receive this communication electronically, please contact [email or web address] or [phone] to request more information on [employer] access.    For providers and/or their staff who would like to refer a patient to [facility], please contact us through our one-stop-shop provider referral line, [facility], at [phone].    If you feel you have received this communication in error or would no longer like to receive these types of communications, please e-mail [email or web address]

## 2020-12-21 ENCOUNTER — OFFICE VISIT (OUTPATIENT)
Dept: DERMATOLOGY | Facility: CLINIC | Age: 75
End: 2020-12-21
Payer: MEDICARE

## 2020-12-21 DIAGNOSIS — L82.1 SK (SEBORRHEIC KERATOSIS): ICD-10-CM

## 2020-12-21 DIAGNOSIS — Z85.828 PERSONAL HISTORY OF SKIN CANCER: ICD-10-CM

## 2020-12-21 DIAGNOSIS — L91.8 SKIN TAG: ICD-10-CM

## 2020-12-21 DIAGNOSIS — L90.5 SCAR: ICD-10-CM

## 2020-12-21 DIAGNOSIS — L81.4 LENTIGO: ICD-10-CM

## 2020-12-21 DIAGNOSIS — L57.0 AK (ACTINIC KERATOSIS): Primary | ICD-10-CM

## 2020-12-21 DIAGNOSIS — D48.5 NEOPLASM OF UNCERTAIN BEHAVIOR OF SKIN: ICD-10-CM

## 2020-12-21 PROCEDURE — 17000 PR DESTRUCTION(LASER SURGERY,CRYOSURGERY,CHEMOSURGERY),PREMALIGNANT LESIONS,FIRST LESION: ICD-10-PCS | Mod: 59,S$PBB,, | Performed by: DERMATOLOGY

## 2020-12-21 PROCEDURE — 88305 TISSUE EXAM BY PATHOLOGIST: CPT | Mod: 26,,, | Performed by: PATHOLOGY

## 2020-12-21 PROCEDURE — 99999 PR PBB SHADOW E&M-EST. PATIENT-LVL III: CPT | Mod: PBBFAC,,, | Performed by: DERMATOLOGY

## 2020-12-21 PROCEDURE — 99202 OFFICE O/P NEW SF 15 MIN: CPT | Mod: 25,S$PBB,, | Performed by: DERMATOLOGY

## 2020-12-21 PROCEDURE — 17003 DESTRUCT PREMALG LES 2-14: CPT | Mod: 59,S$PBB,, | Performed by: DERMATOLOGY

## 2020-12-21 PROCEDURE — 17003 DESTRUCT PREMALG LES 2-14: CPT | Mod: 59,PBBFAC,PO | Performed by: DERMATOLOGY

## 2020-12-21 PROCEDURE — 17003 DESTRUCTION, PREMALIGNANT LESIONS; SECOND THROUGH 14 LESIONS: ICD-10-PCS | Mod: 59,S$PBB,, | Performed by: DERMATOLOGY

## 2020-12-21 PROCEDURE — 17000 DESTRUCT PREMALG LESION: CPT | Mod: 59,S$PBB,, | Performed by: DERMATOLOGY

## 2020-12-21 PROCEDURE — 99202 PR OFFICE/OUTPT VISIT, NEW, LEVL II, 15-29 MIN: ICD-10-PCS | Mod: 25,S$PBB,, | Performed by: DERMATOLOGY

## 2020-12-21 PROCEDURE — 88305 TISSUE EXAM BY PATHOLOGIST: CPT | Performed by: PATHOLOGY

## 2020-12-21 PROCEDURE — 11102 TANGNTL BX SKIN SINGLE LES: CPT | Mod: S$PBB,,, | Performed by: DERMATOLOGY

## 2020-12-21 PROCEDURE — 11102 TANGNTL BX SKIN SINGLE LES: CPT | Mod: PBBFAC,PO | Performed by: DERMATOLOGY

## 2020-12-21 PROCEDURE — 88305 TISSUE EXAM BY PATHOLOGIST: ICD-10-PCS | Mod: 26,,, | Performed by: PATHOLOGY

## 2020-12-21 PROCEDURE — 17000 DESTRUCT PREMALG LESION: CPT | Mod: 59,PBBFAC,PO | Performed by: DERMATOLOGY

## 2020-12-21 PROCEDURE — 99999 PR PBB SHADOW E&M-EST. PATIENT-LVL III: ICD-10-PCS | Mod: PBBFAC,,, | Performed by: DERMATOLOGY

## 2020-12-21 PROCEDURE — 99213 OFFICE O/P EST LOW 20 MIN: CPT | Mod: PBBFAC,PO,25 | Performed by: DERMATOLOGY

## 2020-12-21 PROCEDURE — 11102 PR TANGENTIAL BIOPSY, SKIN, SINGLE LESION: ICD-10-PCS | Mod: S$PBB,,, | Performed by: DERMATOLOGY

## 2020-12-21 NOTE — PROGRESS NOTES
"  Subjective:       Patient ID:  Erasmo Cherry is a 75 y.o. male who presents for   Chief Complaint   Patient presents with    Skin Check     Patient is here today for a "mole" check.   Pt has a history of  extensive sun exposure in the past.   Pt recalls several blistering sunburns in the past- yes  Pt has history of tanning bed use- no  Pt has  had moles removed in the past- yes, benign per pt  Pt has history of melanoma in first degree relatives-  No    Pt c/o lesion on right forearm ; becomes raised and scaly, resolves then recurs. Not bleeding. No tx.   This is a high risk patient here to check for the development of new lesions.  Pt with hx ofmultiple NMSC         Review of Systems   Skin: Positive for activity-related sunscreen use. Negative for daily sunscreen use, tendency to form keloidal scars and recent sunburn.   Hematologic/Lymphatic: Bruises/bleeds easily.        Objective:    Physical Exam   Constitutional: He appears well-developed and well-nourished. No distress.   Neurological: He is alert and oriented to person, place, and time. He is not disoriented.   Psychiatric: He has a normal mood and affect.   Skin:   Areas Examined (abnormalities noted in diagram):   Scalp / Hair Palpated and Inspected  Head / Face Inspection Performed  Neck Inspection Performed  Chest / Axilla Inspection Performed  Abdomen Inspection Performed  Back Inspection Performed  RUE Inspected  LUE Inspection Performed  Nails and Digits Inspection Performed                       Diagram Legend     Erythematous scaling macule/papule c/w actinic keratosis       Vascular papule c/w angioma      Pigmented verrucoid papule/plaque c/w seborrheic keratosis      Yellow umbilicated papule c/w sebaceous hyperplasia      Irregularly shaped tan macule c/w lentigo     1-2 mm smooth white papules consistent with Milia      Movable subcutaneous cyst with punctum c/w epidermal inclusion cyst      Subcutaneous movable cyst c/w pilar cyst     "  Firm pink to brown papule c/w dermatofibroma      Pedunculated fleshy papule(s) c/w skin tag(s)      Evenly pigmented macule c/w junctional nevus     Mildly variegated pigmented, slightly irregular-bordered macule c/w mildly atypical nevus      Flesh colored to evenly pigmented papule c/w intradermal nevus       Pink pearly papule/plaque c/w basal cell carcinoma      Erythematous hyperkeratotic cursted plaque c/w SCC      Surgical scar with no sign of skin cancer recurrence      Open and closed comedones      Inflammatory papules and pustules      Verrucoid papule consistent consistent with wart     Erythematous eczematous patches and plaques     Dystrophic onycholytic nail with subungual debris c/w onychomycosis     Umbilicated papule    Erythematous-base heme-crusted tan verrucoid plaque consistent with inflamed seborrheic keratosis     Erythematous Silvery Scaling Plaque c/w Psoriasis     See annotation      Assessment / Plan:      Pathology Orders:     Normal Orders This Visit    Specimen to Pathology, Dermatology     Questions:    Procedure Type: Dermatology and skin neoplasms    Number of Specimens: 1    ------------------------: -------------------------    Spec 1 Procedure: Biopsy    Spec 1 Clinical Impression: r/o BCC    Spec 1 Source: right temporal scalp        AK (actinic keratosis)  Cryosurgery Procedure Note    Verbal consent from the patient is obtained including, but not limited to, risk of hypopigmentation/hyperpigmentation, scar, recurrence of lesion. The patient is aware of the precancerous quality and need for treatment of these lesions. Liquid nitrogen cryosurgery is applied to the 5 actinic keratoses, as detailed in the physical exam, to produce a freeze injury. The patient is aware that blisters may form and is instructed on wound care with gentle cleansing and use of vaseline ointment to keep moist until healed. The patient is supplied a handout on cryosurgery and is instructed to call if  lesions do not completely resolve.    Neoplasm of uncertain behavior of skin  Shave biopsy procedure note:    Shave biopsy performed after verbal consent including risk of infection, scar, recurrence, need for additional treatment of site. Area prepped with alcohol, anesthetized with approximately 1.0cc of 1% lidocaine with epinephrine. Lesional tissue shaved with razor blade. Hemostasis achieved with application of aluminum chloride followed by hyfrecation. No complications. Dressing applied. Wound care explained.    If biopsy positive for malignancy, refer to Dr. Osborn for Mohs surgery consultation.  -     Specimen to Pathology, Dermatology    Skin tag  Reassurance given to patient. No treatment is necessary.   Treatment of benign, asymptomatic lesions may be considered cosmetic.    SK (seborrheic keratosis)  These are benign inherited growths without a malignant potential. Reassurance given to patient. No treatment is necessary.     Lentigo  This is a benign hyperpigmented sun induced lesion. Daily sun protection will reduce the number of new lesions. Treatment of these benign lesions are considered cosmetic.  The nature of sun-induced photo-aging and skin cancers is discussed.  Sun avoidance, protective clothing, and the use of 30-SPF sunscreens is advised. Observe closely for skin damage/changes, and call if such occurs.    Personal history of skin cancer  Scar  Area(s) of previous NMSC evaluated with no signs of recurrence.    Upper body skin examination performed today including at least 6 points as noted in physical examination. Suspicious lesions noted.             Follow up for prn bx report.

## 2020-12-24 LAB
FINAL PATHOLOGIC DIAGNOSIS: NORMAL
GROSS: NORMAL
MICROSCOPIC EXAM: NORMAL

## 2021-01-21 ENCOUNTER — PROCEDURE VISIT (OUTPATIENT)
Dept: DERMATOLOGY | Facility: CLINIC | Age: 76
End: 2021-01-21
Payer: MEDICARE

## 2021-01-21 VITALS
DIASTOLIC BLOOD PRESSURE: 73 MMHG | HEART RATE: 43 BPM | SYSTOLIC BLOOD PRESSURE: 124 MMHG | BODY MASS INDEX: 29.1 KG/M2 | HEIGHT: 68 IN | WEIGHT: 192 LBS

## 2021-01-21 DIAGNOSIS — C44.41 BASAL CELL CARCINOMA, SCALP/NECK: Primary | ICD-10-CM

## 2021-01-21 PROCEDURE — 17311 MOHS 1 STAGE H/N/HF/G: CPT | Mod: S$PBB,,, | Performed by: DERMATOLOGY

## 2021-01-21 PROCEDURE — 17311 MOHS 1 STAGE H/N/HF/G: CPT | Mod: PBBFAC | Performed by: DERMATOLOGY

## 2021-01-21 PROCEDURE — 13120 PR RECMPL WND SCALP,EXTR 1.1-2.5 CM: ICD-10-PCS | Mod: S$PBB,51,, | Performed by: DERMATOLOGY

## 2021-01-21 PROCEDURE — 13120 CMPLX RPR S/A/L 1.1-2.5 CM: CPT | Mod: PBBFAC | Performed by: DERMATOLOGY

## 2021-01-21 PROCEDURE — 99499 UNLISTED E&M SERVICE: CPT | Mod: S$PBB,,, | Performed by: DERMATOLOGY

## 2021-01-21 PROCEDURE — 13120 CMPLX RPR S/A/L 1.1-2.5 CM: CPT | Mod: S$PBB,51,, | Performed by: DERMATOLOGY

## 2021-01-21 PROCEDURE — 17311: ICD-10-PCS | Mod: S$PBB,,, | Performed by: DERMATOLOGY

## 2021-01-21 PROCEDURE — 99499 NO LOS: ICD-10-PCS | Mod: S$PBB,,, | Performed by: DERMATOLOGY

## 2021-01-22 ENCOUNTER — CLINICAL SUPPORT (OUTPATIENT)
Dept: CARDIOLOGY | Facility: HOSPITAL | Age: 76
End: 2021-01-22
Attending: NURSE PRACTITIONER
Payer: MEDICARE

## 2021-01-22 DIAGNOSIS — I48.19 PERSISTENT ATRIAL FIBRILLATION: ICD-10-CM

## 2021-01-22 PROCEDURE — 93227 HOLTER MONITOR - 24 HOUR (CUPID ONLY): ICD-10-PCS | Mod: ,,, | Performed by: INTERNAL MEDICINE

## 2021-01-22 PROCEDURE — 93226 XTRNL ECG REC<48 HR SCAN A/R: CPT

## 2021-01-22 PROCEDURE — 93227 XTRNL ECG REC<48 HR R&I: CPT | Mod: ,,, | Performed by: INTERNAL MEDICINE

## 2021-02-04 ENCOUNTER — OFFICE VISIT (OUTPATIENT)
Dept: DERMATOLOGY | Facility: CLINIC | Age: 76
End: 2021-02-04
Payer: MEDICARE

## 2021-02-04 DIAGNOSIS — Z09 POSTOP CHECK: Primary | ICD-10-CM

## 2021-02-04 PROCEDURE — 99024 POSTOP FOLLOW-UP VISIT: CPT | Mod: POP,,, | Performed by: DERMATOLOGY

## 2021-02-04 PROCEDURE — 99024 PR POST-OP FOLLOW-UP VISIT: ICD-10-PCS | Mod: POP,,, | Performed by: DERMATOLOGY

## 2021-03-03 ENCOUNTER — LAB VISIT (OUTPATIENT)
Dept: LAB | Facility: OTHER | Age: 76
End: 2021-03-03
Attending: INTERNAL MEDICINE
Payer: MEDICARE

## 2021-03-03 DIAGNOSIS — E03.9 HYPOTHYROIDISM, UNSPECIFIED TYPE: ICD-10-CM

## 2021-03-03 DIAGNOSIS — I10 ESSENTIAL HYPERTENSION: ICD-10-CM

## 2021-03-03 DIAGNOSIS — I48.19 PERSISTENT ATRIAL FIBRILLATION: ICD-10-CM

## 2021-03-03 DIAGNOSIS — E13.9 DIABETES MELLITUS DUE TO ABNORMAL INSULIN: ICD-10-CM

## 2021-03-03 LAB
ALBUMIN SERPL BCP-MCNC: 4.7 G/DL (ref 3.5–5.2)
ALP SERPL-CCNC: 61 U/L (ref 55–135)
ALT SERPL W/O P-5'-P-CCNC: 19 U/L (ref 10–44)
ANION GAP SERPL CALC-SCNC: 10 MMOL/L (ref 8–16)
AST SERPL-CCNC: 24 U/L (ref 10–40)
BILIRUB SERPL-MCNC: 2.4 MG/DL (ref 0.1–1)
BUN SERPL-MCNC: 17 MG/DL (ref 8–23)
CALCIUM SERPL-MCNC: 10.4 MG/DL (ref 8.7–10.5)
CHLORIDE SERPL-SCNC: 103 MMOL/L (ref 95–110)
CHOLEST SERPL-MCNC: 104 MG/DL (ref 120–199)
CHOLEST/HDLC SERPL: 2.3 {RATIO} (ref 2–5)
CO2 SERPL-SCNC: 26 MMOL/L (ref 23–29)
CREAT SERPL-MCNC: 1 MG/DL (ref 0.5–1.4)
EST. GFR  (AFRICAN AMERICAN): >60 ML/MIN/1.73 M^2
EST. GFR  (NON AFRICAN AMERICAN): >60 ML/MIN/1.73 M^2
GLUCOSE SERPL-MCNC: 120 MG/DL (ref 70–110)
HDLC SERPL-MCNC: 46 MG/DL (ref 40–75)
HDLC SERPL: 44.2 % (ref 20–50)
LDLC SERPL CALC-MCNC: 44.8 MG/DL (ref 63–159)
NONHDLC SERPL-MCNC: 58 MG/DL
POTASSIUM SERPL-SCNC: 5 MMOL/L (ref 3.5–5.1)
PROT SERPL-MCNC: 8 G/DL (ref 6–8.4)
SODIUM SERPL-SCNC: 139 MMOL/L (ref 136–145)
TRIGL SERPL-MCNC: 66 MG/DL (ref 30–150)

## 2021-03-03 PROCEDURE — 80053 COMPREHEN METABOLIC PANEL: CPT

## 2021-03-03 PROCEDURE — 83036 HEMOGLOBIN GLYCOSYLATED A1C: CPT | Performed by: INTERNAL MEDICINE

## 2021-03-03 PROCEDURE — 80061 LIPID PANEL: CPT | Performed by: INTERNAL MEDICINE

## 2021-03-03 PROCEDURE — 36415 COLL VENOUS BLD VENIPUNCTURE: CPT

## 2021-03-04 LAB
ESTIMATED AVG GLUCOSE: 146 MG/DL (ref 68–131)
HBA1C MFR BLD: 6.7 % (ref 4–5.6)

## 2021-06-23 ENCOUNTER — OFFICE VISIT (OUTPATIENT)
Dept: DERMATOLOGY | Facility: CLINIC | Age: 76
End: 2021-06-23
Payer: MEDICARE

## 2021-06-23 DIAGNOSIS — Z85.828 PERSONAL HISTORY OF SKIN CANCER: ICD-10-CM

## 2021-06-23 DIAGNOSIS — D48.5 NEOPLASM OF UNCERTAIN BEHAVIOR OF SKIN: ICD-10-CM

## 2021-06-23 DIAGNOSIS — D22.9 NEVUS: ICD-10-CM

## 2021-06-23 DIAGNOSIS — L90.5 SCAR: Primary | ICD-10-CM

## 2021-06-23 DIAGNOSIS — D69.2 SENILE PURPURA: ICD-10-CM

## 2021-06-23 DIAGNOSIS — L82.1 SK (SEBORRHEIC KERATOSIS): ICD-10-CM

## 2021-06-23 DIAGNOSIS — L81.4 LENTIGO: ICD-10-CM

## 2021-06-23 DIAGNOSIS — L57.0 AK (ACTINIC KERATOSIS): ICD-10-CM

## 2021-06-23 PROCEDURE — 11102 TANGNTL BX SKIN SINGLE LES: CPT | Mod: S$PBB,,, | Performed by: DERMATOLOGY

## 2021-06-23 PROCEDURE — 17000 DESTRUCT PREMALG LESION: CPT | Mod: 59,PBBFAC,PO | Performed by: DERMATOLOGY

## 2021-06-23 PROCEDURE — 99213 OFFICE O/P EST LOW 20 MIN: CPT | Mod: PBBFAC,PO | Performed by: DERMATOLOGY

## 2021-06-23 PROCEDURE — 88305 TISSUE EXAM BY PATHOLOGIST: CPT | Mod: 26,,, | Performed by: PATHOLOGY

## 2021-06-23 PROCEDURE — 17003 DESTRUCT PREMALG LES 2-14: CPT | Mod: S$PBB,,, | Performed by: DERMATOLOGY

## 2021-06-23 PROCEDURE — 17003 DESTRUCT PREMALG LES 2-14: CPT | Mod: 59,PBBFAC,PO | Performed by: DERMATOLOGY

## 2021-06-23 PROCEDURE — 17000 PR DESTRUCTION(LASER SURGERY,CRYOSURGERY,CHEMOSURGERY),PREMALIGNANT LESIONS,FIRST LESION: ICD-10-PCS | Mod: 59,S$PBB,, | Performed by: DERMATOLOGY

## 2021-06-23 PROCEDURE — 17000 DESTRUCT PREMALG LESION: CPT | Mod: 59,S$PBB,, | Performed by: DERMATOLOGY

## 2021-06-23 PROCEDURE — 99999 PR PBB SHADOW E&M-EST. PATIENT-LVL III: CPT | Mod: PBBFAC,,, | Performed by: DERMATOLOGY

## 2021-06-23 PROCEDURE — 99213 PR OFFICE/OUTPT VISIT, EST, LEVL III, 20-29 MIN: ICD-10-PCS | Mod: 25,S$PBB,, | Performed by: DERMATOLOGY

## 2021-06-23 PROCEDURE — 99213 OFFICE O/P EST LOW 20 MIN: CPT | Mod: 25,S$PBB,, | Performed by: DERMATOLOGY

## 2021-06-23 PROCEDURE — 11102 TANGNTL BX SKIN SINGLE LES: CPT | Mod: PBBFAC,PO | Performed by: DERMATOLOGY

## 2021-06-23 PROCEDURE — 99999 PR PBB SHADOW E&M-EST. PATIENT-LVL III: ICD-10-PCS | Mod: PBBFAC,,, | Performed by: DERMATOLOGY

## 2021-06-23 PROCEDURE — 88305 TISSUE EXAM BY PATHOLOGIST: ICD-10-PCS | Mod: 26,,, | Performed by: PATHOLOGY

## 2021-06-23 PROCEDURE — 17003 DESTRUCTION, PREMALIGNANT LESIONS; SECOND THROUGH 14 LESIONS: ICD-10-PCS | Mod: S$PBB,,, | Performed by: DERMATOLOGY

## 2021-06-23 PROCEDURE — 11102 PR TANGENTIAL BIOPSY, SKIN, SINGLE LESION: ICD-10-PCS | Mod: S$PBB,,, | Performed by: DERMATOLOGY

## 2021-06-23 PROCEDURE — 88305 TISSUE EXAM BY PATHOLOGIST: CPT | Performed by: PATHOLOGY

## 2021-06-28 LAB
FINAL PATHOLOGIC DIAGNOSIS: NORMAL
GROSS: NORMAL
Lab: NORMAL
MICROSCOPIC EXAM: NORMAL

## 2021-08-16 ENCOUNTER — PROCEDURE VISIT (OUTPATIENT)
Dept: DERMATOLOGY | Facility: CLINIC | Age: 76
End: 2021-08-16
Payer: MEDICARE

## 2021-08-16 DIAGNOSIS — C44.519 BCC (BASAL CELL CARCINOMA), TRUNK: Primary | ICD-10-CM

## 2021-08-16 DIAGNOSIS — Z85.828 PERSONAL HISTORY OF SKIN CANCER: ICD-10-CM

## 2021-08-16 PROCEDURE — 12032 INTMD RPR S/A/T/EXT 2.6-7.5: CPT | Mod: S$PBB,,, | Performed by: DERMATOLOGY

## 2021-08-16 PROCEDURE — 11602 EXC TR-EXT MAL+MARG 1.1-2 CM: CPT | Mod: S$PBB,51,, | Performed by: DERMATOLOGY

## 2021-08-16 PROCEDURE — 99499 NO LOS: ICD-10-PCS | Mod: S$PBB,,, | Performed by: DERMATOLOGY

## 2021-08-16 PROCEDURE — 11602 EXC TR-EXT MAL+MARG 1.1-2 CM: CPT | Mod: PBBFAC,PO | Performed by: DERMATOLOGY

## 2021-08-16 PROCEDURE — 88305 TISSUE EXAM BY PATHOLOGIST: CPT | Mod: 26,,, | Performed by: PATHOLOGY

## 2021-08-16 PROCEDURE — 12032 INTMD RPR S/A/T/EXT 2.6-7.5: CPT | Mod: PBBFAC,PO | Performed by: DERMATOLOGY

## 2021-08-16 PROCEDURE — 12032 PR LAYR CLOS WND TRUNK,ARM,LEG 2.6-7.5 CM: ICD-10-PCS | Mod: S$PBB,,, | Performed by: DERMATOLOGY

## 2021-08-16 PROCEDURE — 88305 TISSUE EXAM BY PATHOLOGIST: CPT | Performed by: PATHOLOGY

## 2021-08-16 PROCEDURE — 11602 PR EXC SKIN MALIG 1.1-2 CM TRUNK,ARM,LEG: ICD-10-PCS | Mod: S$PBB,51,, | Performed by: DERMATOLOGY

## 2021-08-16 PROCEDURE — 88305 TISSUE EXAM BY PATHOLOGIST: ICD-10-PCS | Mod: 26,,, | Performed by: PATHOLOGY

## 2021-08-16 PROCEDURE — 99499 UNLISTED E&M SERVICE: CPT | Mod: S$PBB,,, | Performed by: DERMATOLOGY

## 2021-08-19 LAB
FINAL PATHOLOGIC DIAGNOSIS: NORMAL
GROSS: NORMAL
Lab: NORMAL
MICROSCOPIC EXAM: NORMAL

## 2021-09-22 ENCOUNTER — PATIENT MESSAGE (OUTPATIENT)
Dept: SLEEP MEDICINE | Facility: CLINIC | Age: 76
End: 2021-09-22

## 2021-09-23 ENCOUNTER — PATIENT MESSAGE (OUTPATIENT)
Dept: DERMATOLOGY | Facility: CLINIC | Age: 76
End: 2021-09-23

## 2022-03-03 NOTE — PROGRESS NOTES
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      Subjective:   Patient ID:  Erasmo Cherry is a 76 y.o. male who presents for follow-up of Atrial Fibrillation  .     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    Presented to PCP for routine visit, ECG 12/14/2016 revealed atrial fibrillation, rate controlled. He was asymptomatic.  At f/u he remained in atrial fibrillation with controlled ventricular response.  He reported feeling fine.  Denies palpitations, shortness of breath, fatigue, syncope.    Of note has undergone liver biopsy x 2, diagnosed with WHEAT.  We initiated Eliquis 5 mg BID.  Echo 1/19/2017 EF 40-45% GENARO 46.89  I recommended DCCV.  He deferred.  Pet stress 3/30/2017 negative for ischemia.  Ultimately proceeded with DCCV 7/25/2018.  Toprol added. One week later was back out of rhythm.  As EF has not improved, I endorsed an attempt at rhythm control and re-assessing.  Options are PVI or amiodarone. He prefers, and I agree (short term) with the latter.     Plan was for Amiodarone 200 mg daily. DCCV in one month.    Underwent successful DCCV on 2/6/2018.   Was back out of rhythm at follow-up.  Amiodarone discontinued.    Continues to feel well.  Has resumed biking. Denies dyspnea on exertion.  Echo 3/25/19 EF 55% PASP 41mmHg  Holter 8/28/19 AF with average ventricular response of 56 bpm  Remains rate controlled, asymptomatic. EF is normal.    12/17/2020: He is here for annual follow up. He feels well. Ubaldo but denies LH. Will reduce metoprolol. Holter in 1 mo to assess heart rate curve. On eliquis for CVA prophylaxis (CHADSVASc 3).  Denies CHF symptoms. Exercises regularly. RTC 1 yr.    Update (03/07/2022):    Today he says he continues to feel well. Rides his bicycle regularly. No cardiac complaints.  No CP, WILLETT, palpitations, LH, syncope.    He is currently taking eliquis 5mg BID for stroke prophylaxis and denies significant bleeding episodes. He is currently being treated with toprol 12.5mg daily for HR control.  Kidney function is stable, with a creatinine of 1 on 3/3/2021.    I have personally reviewed the patient's EKG today, which shows AF with RBBB at 43bpm. QRS is 152. QT is 482.    Relevant Cardiac Test Results:    2D Echo (3/25/2019):  · Normal left ventricular systolic function. The estimated ejection fraction is 55%  · No wall motion abnormalities.  · Biatrial enlargement.  · Normal right ventricular systolic function.  · Mild aortic regurgitation.  · Mild tricuspid regurgitation.  · Moderate right ventricular enlargement.  · The estimated PA systolic pressure is 41 mm Hg  · Pulmonary hypertension present.  · Elevated central venous pressure (15 mm Hg).  · Atrial fibrillation observed.    Current Outpatient Medications   Medication Sig    atorvastatin (LIPITOR) 20 MG tablet TAKE 1 TABLET DAILY    blood sugar diagnostic (FREESTYLE LITE STRIPS) Strp USE TO TEST TWICE A DAY AS NEEDED    dulaglutide (TRULICITY) 4.5 mg/0.5 mL pen injector Inject 4.5 mg into the skin every 7 days.    ELIQUIS 5 mg Tab TAKE 1 TABLET TWICE A DAY    ergocalciferol (VITAMIN D2) 50,000 unit Cap TAKE 1 CAPSULE EVERY 7 DAYS    flash glucose sensor (FREESTYLE AURE 14 DAY SENSOR) Kit CHANGE EVERY 14 DAYS    FREESTYLE AURE 14 DAY READER Misc TEST BLOOD SUGAR BID.    lisinopriL (PRINIVIL,ZESTRIL) 20 MG tablet TAKE 1 TABLET DAILY    metFORMIN (GLUCOPHAGE-XR) 750 MG ER 24hr tablet Take 1 tablet (750 mg total) by mouth 2 (two) times daily with meals.    metoprolol succinate (TOPROL-XL) 25 MG 24 hr tablet TAKE ONE-HALF (1/2) TABLET DAILY    nystatin-triamcinolone (MYCOLOG) ointment     SYNTHROID 88 mcg tablet TAKE 1 TABLET DAILY BEFORE BREAKFAST    vitamin E 800 UNIT capsule Take 800 Units by mouth once daily.     No current  facility-administered medications for this visit.     Facility-Administered Medications Ordered in Other Visits   Medication    sodium chloride 0.9% flush 5 mL       Review of Systems   Constitutional: Negative for malaise/fatigue.   Cardiovascular: Negative for chest pain, dyspnea on exertion, irregular heartbeat, leg swelling and palpitations.   Respiratory: Negative for shortness of breath.    Hematologic/Lymphatic: Negative for bleeding problem.   Skin: Negative for rash.   Musculoskeletal: Negative for myalgias.   Gastrointestinal: Negative for hematemesis, hematochezia and nausea.   Genitourinary: Negative for hematuria.   Neurological: Negative for light-headedness.   Psychiatric/Behavioral: Negative for altered mental status.   Allergic/Immunologic: Negative for persistent infections.       Objective:          BP (!) 147/68   Pulse (!) 52   Wt 87.2 kg (192 lb 3.9 oz)   BMI 29.23 kg/m²     Physical Exam  Vitals and nursing note reviewed.   Constitutional:       Appearance: Normal appearance. He is well-developed.   HENT:      Head: Normocephalic.      Nose: Nose normal.   Eyes:      Pupils: Pupils are equal, round, and reactive to light.   Cardiovascular:      Rate and Rhythm: Bradycardia present. Rhythm irregularly irregular.   Pulmonary:      Effort: No respiratory distress.      Breath sounds: Normal breath sounds.   Musculoskeletal:         General: Normal range of motion.   Skin:     General: Skin is warm and dry.      Findings: No erythema.   Neurological:      Mental Status: He is alert and oriented to person, place, and time.   Psychiatric:         Speech: Speech normal.         Behavior: Behavior normal.       Lab Results   Component Value Date     03/03/2021    K 5.0 03/03/2021    BUN 17 03/03/2021    CREATININE 1.0 03/03/2021    ALT 19 03/03/2021    AST 24 03/03/2021    HGB 16.7 02/01/2019    HCT 49.0 02/01/2019    TSH 1.832 09/09/2019    LDLCALC 44.8 (L) 03/03/2021           Assessment:      1. Persistent atrial fibrillation    2. RBBB    3. Essential hypertension    4. Obstructive sleep apnea      Plan:     In summary,  Jo Ann is a 76 y.o. male with HTN, DM, atrial fibrillation, colon polyps, nonischemic cardiomyopathy (recovered), RBBB here for follow up.   He continues to do well in rate control strategy. No palpitations. No CHF symptoms. Remains very active. Ubaldo - will stop metoprolol and update Holter. Update echo prior to next clinic visit.  CHADSVASc 3 on eliquis for CVA prophylaxis. BP elevated. Enroll in digital HTN program.    Stop metoprolol.  Holter monitor in 2 weeks  Digital hypertension program  Return to clinic in one year with updated echocardiogram    *A copy of this note has been sent to Dr. Blake*    Follow up in about 1 year (around 3/7/2023).    ------------------------------------------------------------------    WILMA Alanis, NP-C  Cardiac Electrophysiology

## 2022-03-07 ENCOUNTER — OFFICE VISIT (OUTPATIENT)
Dept: ELECTROPHYSIOLOGY | Facility: CLINIC | Age: 77
End: 2022-03-07
Payer: MEDICARE

## 2022-03-07 VITALS
DIASTOLIC BLOOD PRESSURE: 68 MMHG | HEART RATE: 52 BPM | WEIGHT: 192.25 LBS | SYSTOLIC BLOOD PRESSURE: 147 MMHG | BODY MASS INDEX: 29.23 KG/M2

## 2022-03-07 DIAGNOSIS — I49.8 OTHER SPECIFIED CARDIAC ARRHYTHMIAS: Primary | ICD-10-CM

## 2022-03-07 DIAGNOSIS — I10 ESSENTIAL HYPERTENSION: ICD-10-CM

## 2022-03-07 DIAGNOSIS — G47.33 OBSTRUCTIVE SLEEP APNEA: ICD-10-CM

## 2022-03-07 DIAGNOSIS — I48.19 PERSISTENT ATRIAL FIBRILLATION: Primary | ICD-10-CM

## 2022-03-07 DIAGNOSIS — I45.10 RBBB: ICD-10-CM

## 2022-03-07 DIAGNOSIS — I49.8 OTHER SPECIFIED CARDIAC ARRHYTHMIAS: ICD-10-CM

## 2022-03-07 PROCEDURE — 93010 ELECTROCARDIOGRAM REPORT: CPT | Mod: S$PBB,,, | Performed by: INTERNAL MEDICINE

## 2022-03-07 PROCEDURE — 99999 PR PBB SHADOW E&M-EST. PATIENT-LVL III: ICD-10-PCS | Mod: PBBFAC,,, | Performed by: NURSE PRACTITIONER

## 2022-03-07 PROCEDURE — 93010 RHYTHM STRIP: ICD-10-PCS | Mod: S$PBB,,, | Performed by: INTERNAL MEDICINE

## 2022-03-07 PROCEDURE — 99999 PR PBB SHADOW E&M-EST. PATIENT-LVL III: CPT | Mod: PBBFAC,,, | Performed by: NURSE PRACTITIONER

## 2022-03-07 PROCEDURE — 99213 OFFICE O/P EST LOW 20 MIN: CPT | Mod: PBBFAC | Performed by: NURSE PRACTITIONER

## 2022-03-07 PROCEDURE — 99214 PR OFFICE/OUTPT VISIT, EST, LEVL IV, 30-39 MIN: ICD-10-PCS | Mod: S$PBB,,, | Performed by: NURSE PRACTITIONER

## 2022-03-07 PROCEDURE — 99214 OFFICE O/P EST MOD 30 MIN: CPT | Mod: S$PBB,,, | Performed by: NURSE PRACTITIONER

## 2022-03-07 PROCEDURE — 93005 ELECTROCARDIOGRAM TRACING: CPT | Mod: PBBFAC | Performed by: INTERNAL MEDICINE

## 2022-03-07 RX ORDER — NYSTATIN AND TRIAMCINOLONE ACETONIDE 100000; 1 [USP'U]/G; MG/G
OINTMENT TOPICAL
COMMUNITY
Start: 2022-02-14 | End: 2023-02-09

## 2022-03-07 NOTE — PATIENT INSTRUCTIONS
Stop metoprolol.  Holter monitor in 2 weeks  Digital hypertension program  Return to clinic in one year with updated echocardiogram

## 2022-03-07 NOTE — Clinical Note
Can we set patient up with Holter in 2 weeks? He can update his echo now or when he comes back next year. His choice. Thanks.

## 2022-03-21 ENCOUNTER — HOSPITAL ENCOUNTER (OUTPATIENT)
Dept: CARDIOLOGY | Facility: HOSPITAL | Age: 77
Discharge: HOME | End: 2022-03-21
Attending: NURSE PRACTITIONER
Payer: MEDICARE

## 2022-03-21 ENCOUNTER — CLINICAL SUPPORT (OUTPATIENT)
Dept: CARDIOLOGY | Facility: HOSPITAL | Age: 77
End: 2022-03-21
Attending: NURSE PRACTITIONER
Payer: MEDICARE

## 2022-03-21 VITALS
HEIGHT: 68 IN | WEIGHT: 192 LBS | DIASTOLIC BLOOD PRESSURE: 78 MMHG | BODY MASS INDEX: 29.1 KG/M2 | SYSTOLIC BLOOD PRESSURE: 140 MMHG | HEART RATE: 48 BPM

## 2022-03-21 DIAGNOSIS — I48.19 PERSISTENT ATRIAL FIBRILLATION: ICD-10-CM

## 2022-03-21 LAB
ASCENDING AORTA: 3.28 CM
AV INDEX (PROSTH): 0.47
AV MEAN GRADIENT: 5 MMHG
AV PEAK GRADIENT: 8 MMHG
AV VALVE AREA: 1.7 CM2
AV VELOCITY RATIO: 0.51
BSA FOR ECHO PROCEDURE: 2.04 M2
CV ECHO LV RWT: 0.44 CM
DOP CALC AO PEAK VEL: 1.44 M/S
DOP CALC AO VTI: 33.72 CM
DOP CALC LVOT AREA: 3.6 CM2
DOP CALC LVOT DIAMETER: 2.14 CM
DOP CALC LVOT PEAK VEL: 0.73 M/S
DOP CALC LVOT STROKE VOLUME: 57.48 CM3
DOP CALCLVOT PEAK VEL VTI: 15.99 CM
E/E' RATIO: 10.78 M/S
ECHO LV POSTERIOR WALL: 1.12 CM (ref 0.6–1.1)
EJECTION FRACTION: 55 %
FRACTIONAL SHORTENING: 36 % (ref 28–44)
INTERVENTRICULAR SEPTUM: 0.92 CM (ref 0.6–1.1)
LA MAJOR: 6.95 CM
LA MINOR: 6.49 CM
LA WIDTH: 4.81 CM
LEFT ATRIUM SIZE: 4.99 CM
LEFT ATRIUM VOLUME INDEX MOD: 46.6 ML/M2
LEFT ATRIUM VOLUME INDEX: 68.1 ML/M2
LEFT ATRIUM VOLUME MOD: 93.68 CM3
LEFT ATRIUM VOLUME: 136.94 CM3
LEFT INTERNAL DIMENSION IN SYSTOLE: 3.23 CM (ref 2.1–4)
LEFT VENTRICLE DIASTOLIC VOLUME INDEX: 60.55 ML/M2
LEFT VENTRICLE DIASTOLIC VOLUME: 121.71 ML
LEFT VENTRICLE MASS INDEX: 95 G/M2
LEFT VENTRICLE SYSTOLIC VOLUME INDEX: 20.8 ML/M2
LEFT VENTRICLE SYSTOLIC VOLUME: 41.77 ML
LEFT VENTRICULAR INTERNAL DIMENSION IN DIASTOLE: 5.06 CM (ref 3.5–6)
LEFT VENTRICULAR MASS: 190.59 G
LV LATERAL E/E' RATIO: 8.82 M/S
LV SEPTAL E/E' RATIO: 13.86 M/S
MV PEAK E VEL: 0.97 M/S
PISA TR MAX VEL: 2.26 M/S
RA MAJOR: 6.37 CM
RA PRESSURE: 8 MMHG
RA WIDTH: 5.48 CM
RIGHT ATRIAL AREA: 32 CM2
RIGHT VENTRICULAR END-DIASTOLIC DIMENSION: 5.21 CM
RV TISSUE DOPPLER FREE WALL SYSTOLIC VELOCITY 1 (APICAL 4 CHAMBER VIEW): 9.53 CM/S
SINUS: 2.82 CM
STJ: 2.64 CM
TDI LATERAL: 0.11 M/S
TDI SEPTAL: 0.07 M/S
TDI: 0.09 M/S
TR MAX PG: 20 MMHG
TRICUSPID ANNULAR PLANE SYSTOLIC EXCURSION: 1.95 CM
TV REST PULMONARY ARTERY PRESSURE: 28 MMHG

## 2022-03-21 PROCEDURE — 93227 HOLTER MONITOR - 24 HOUR (CUPID ONLY): ICD-10-PCS | Mod: ,,, | Performed by: INTERNAL MEDICINE

## 2022-03-21 PROCEDURE — 93226 XTRNL ECG REC<48 HR SCAN A/R: CPT

## 2022-03-21 PROCEDURE — 93227 XTRNL ECG REC<48 HR R&I: CPT | Mod: ,,, | Performed by: INTERNAL MEDICINE

## 2022-03-21 PROCEDURE — 93306 ECHO (CUPID ONLY): ICD-10-PCS | Mod: 26,,, | Performed by: INTERNAL MEDICINE

## 2022-03-21 PROCEDURE — 93306 TTE W/DOPPLER COMPLETE: CPT

## 2022-03-21 PROCEDURE — 93306 TTE W/DOPPLER COMPLETE: CPT | Mod: 26,,, | Performed by: INTERNAL MEDICINE

## 2022-06-02 ENCOUNTER — LAB VISIT (OUTPATIENT)
Dept: LAB | Facility: OTHER | Age: 77
End: 2022-06-02
Attending: INTERNAL MEDICINE
Payer: MEDICARE

## 2022-06-02 DIAGNOSIS — R79.89 ELEVATED LFTS: ICD-10-CM

## 2022-06-02 DIAGNOSIS — E03.9 HYPOTHYROIDISM, UNSPECIFIED TYPE: ICD-10-CM

## 2022-06-02 DIAGNOSIS — Z79.899 MEDICATION MANAGEMENT: ICD-10-CM

## 2022-06-02 DIAGNOSIS — E11.65 TYPE 2 DIABETES MELLITUS WITH HYPERGLYCEMIA, WITHOUT LONG-TERM CURRENT USE OF INSULIN: ICD-10-CM

## 2022-06-02 LAB
ALBUMIN SERPL BCP-MCNC: 4.4 G/DL (ref 3.5–5.2)
ALP SERPL-CCNC: 64 U/L (ref 55–135)
ALT SERPL W/O P-5'-P-CCNC: 15 U/L (ref 10–44)
ANION GAP SERPL CALC-SCNC: 16 MMOL/L (ref 8–16)
AST SERPL-CCNC: 23 U/L (ref 10–40)
BILIRUB SERPL-MCNC: 3.6 MG/DL (ref 0.1–1)
BUN SERPL-MCNC: 16 MG/DL (ref 8–23)
CALCIUM SERPL-MCNC: 10.1 MG/DL (ref 8.7–10.5)
CHLORIDE SERPL-SCNC: 104 MMOL/L (ref 95–110)
CO2 SERPL-SCNC: 22 MMOL/L (ref 23–29)
CREAT SERPL-MCNC: 1 MG/DL (ref 0.5–1.4)
EST. GFR  (AFRICAN AMERICAN): >60 ML/MIN/1.73 M^2
EST. GFR  (NON AFRICAN AMERICAN): >60 ML/MIN/1.73 M^2
ESTIMATED AVG GLUCOSE: 171 MG/DL (ref 68–131)
GLUCOSE SERPL-MCNC: 134 MG/DL (ref 70–110)
HBA1C MFR BLD: 7.6 % (ref 4–5.6)
POTASSIUM SERPL-SCNC: 4.3 MMOL/L (ref 3.5–5.1)
PROT SERPL-MCNC: 7.6 G/DL (ref 6–8.4)
SODIUM SERPL-SCNC: 142 MMOL/L (ref 136–145)
TSH SERPL DL<=0.005 MIU/L-ACNC: 1.04 UIU/ML (ref 0.4–4)

## 2022-06-02 PROCEDURE — 83036 HEMOGLOBIN GLYCOSYLATED A1C: CPT | Performed by: INTERNAL MEDICINE

## 2022-06-02 PROCEDURE — 84443 ASSAY THYROID STIM HORMONE: CPT | Performed by: INTERNAL MEDICINE

## 2022-06-02 PROCEDURE — 80053 COMPREHEN METABOLIC PANEL: CPT | Performed by: INTERNAL MEDICINE

## 2022-06-02 PROCEDURE — 36415 COLL VENOUS BLD VENIPUNCTURE: CPT | Performed by: INTERNAL MEDICINE

## 2022-06-13 ENCOUNTER — PATIENT MESSAGE (OUTPATIENT)
Dept: GASTROENTEROLOGY | Facility: CLINIC | Age: 77
End: 2022-06-13
Payer: MEDICARE

## 2022-10-12 DIAGNOSIS — Z12.11 SCREEN FOR COLON CANCER: Primary | ICD-10-CM

## 2022-11-10 ENCOUNTER — TELEPHONE (OUTPATIENT)
Dept: ENDOSCOPY | Facility: HOSPITAL | Age: 77
End: 2022-11-10

## 2022-11-10 ENCOUNTER — CLINICAL SUPPORT (OUTPATIENT)
Dept: ENDOSCOPY | Facility: HOSPITAL | Age: 77
End: 2022-11-10
Payer: MEDICARE

## 2022-11-10 DIAGNOSIS — Z12.11 SCREEN FOR COLON CANCER: ICD-10-CM

## 2022-11-10 DIAGNOSIS — Z86.010 HISTORY OF COLON POLYPS: Primary | ICD-10-CM

## 2022-11-10 NOTE — TELEPHONE ENCOUNTER
Katelyn Stack,    Pt has order for Colonoscopy. Can he hold Eliquis x 2 days per Endoscopy protocol? Please advise.  Thanks,   Marcia. KEITH.

## 2022-11-10 NOTE — TELEPHONE ENCOUNTER
Moisés Kennedy,    Dr Stack is OOT , but I texted her about Dr Cherry. She said that his cardiologist would need to ok that. She believes it would be ok, but would rather have the cardiologist give the ok.    Thanks so much    Porsha

## 2022-11-11 ENCOUNTER — TELEPHONE (OUTPATIENT)
Dept: ENDOSCOPY | Facility: HOSPITAL | Age: 77
End: 2022-11-11
Payer: MEDICARE

## 2022-11-11 VITALS — HEIGHT: 67 IN | BODY MASS INDEX: 28.88 KG/M2 | WEIGHT: 184 LBS

## 2022-11-11 DIAGNOSIS — Z12.11 SCREENING FOR COLON CANCER: Primary | ICD-10-CM

## 2022-11-11 RX ORDER — POLYETHYLENE GLYCOL 3350, SODIUM SULFATE ANHYDROUS, SODIUM BICARBONATE, SODIUM CHLORIDE, POTASSIUM CHLORIDE 236; 22.74; 6.74; 5.86; 2.97 G/4L; G/4L; G/4L; G/4L; G/4L
4 POWDER, FOR SOLUTION ORAL ONCE
Qty: 4000 ML | Refills: 0 | Status: SHIPPED | OUTPATIENT
Start: 2022-11-11 | End: 2022-11-11

## 2022-11-11 NOTE — TELEPHONE ENCOUNTER
Good morning NP Maxine Barrera    Pt has order for Colonoscopy. Can he hold Eliquis x 2 days per Endoscopy protocol?   Also is he cleared from a cardiac standpoint to get his colonoscopy done? Please advise.  Thanks,   Marcia PARKER.

## 2022-11-11 NOTE — TELEPHONE ENCOUNTER
REZA Alanis RN  Caller: Unspecified (Today,  8:53 AM)  Yes that's fine           Previous Messages  Patient Calls  (Newest Message First)   Maxine Barrera NP  You 45 minutes ago (9:54 AM)     Yes that's fine       You routed conversation to Maxine Barrera NP 1 hour ago (8:58 AM)     You 1 hour ago (8:58 AM)     PC  Good morning NP Maxine Barrera     Pt has order for Colonoscopy. Can he hold Eliquis x 2 days per Endoscopy protocol?   Also is he cleared from a cardiac standpoint to get his colonoscopy done? Please advise.  Thanks,   Marcia PARKER.         Note

## 2022-11-11 NOTE — TELEPHONE ENCOUNTER
Moisés Kennedy,     Dr Stack is OOT , but I texted her about Dr Cherry. She said that his cardiologist would need to ok that. She believes it would be ok, but would rather have the cardiologist give the ok.     Thanks so much     Porsha         Note       You routed conversation to Chichi Stack MD; Sreekanth ALCAZAR Staff 18 hours ago (2:06 PM)     You 18 hours ago (2:06 PM)     [facility] Dr JAEL Stack,     Pt has order for Colonoscopy. Can he hold Eliquis x 2 days per Endoscopy protocol? Please advise.  Thanks,   Marcia. KEITH.

## 2022-11-17 ENCOUNTER — ANESTHESIA EVENT (OUTPATIENT)
Dept: ENDOSCOPY | Facility: HOSPITAL | Age: 77
End: 2022-11-17
Payer: MEDICARE

## 2022-11-17 ENCOUNTER — ANESTHESIA (OUTPATIENT)
Dept: ENDOSCOPY | Facility: HOSPITAL | Age: 77
End: 2022-11-17
Payer: MEDICARE

## 2022-11-17 ENCOUNTER — HOSPITAL ENCOUNTER (OUTPATIENT)
Facility: HOSPITAL | Age: 77
Discharge: HOME | End: 2022-11-17
Attending: INTERNAL MEDICINE | Admitting: INTERNAL MEDICINE
Payer: MEDICARE

## 2022-11-17 VITALS
DIASTOLIC BLOOD PRESSURE: 67 MMHG | HEIGHT: 67 IN | SYSTOLIC BLOOD PRESSURE: 161 MMHG | OXYGEN SATURATION: 99 % | TEMPERATURE: 98 F | HEART RATE: 41 BPM | WEIGHT: 180 LBS | BODY MASS INDEX: 28.25 KG/M2 | RESPIRATION RATE: 20 BRPM

## 2022-11-17 DIAGNOSIS — Z12.11 ENCOUNTER FOR COLONOSCOPY DUE TO HISTORY OF ADENOMATOUS COLONIC POLYPS: ICD-10-CM

## 2022-11-17 DIAGNOSIS — Z86.010 ENCOUNTER FOR COLONOSCOPY DUE TO HISTORY OF ADENOMATOUS COLONIC POLYPS: ICD-10-CM

## 2022-11-17 LAB — POCT GLUCOSE: 161 MG/DL (ref 70–110)

## 2022-11-17 PROCEDURE — 37000008 HC ANESTHESIA 1ST 15 MINUTES: Performed by: INTERNAL MEDICINE

## 2022-11-17 PROCEDURE — 25000003 PHARM REV CODE 250: Performed by: NURSE ANESTHETIST, CERTIFIED REGISTERED

## 2022-11-17 PROCEDURE — 88305 TISSUE EXAM BY PATHOLOGIST: ICD-10-PCS | Mod: 26,,, | Performed by: PATHOLOGY

## 2022-11-17 PROCEDURE — 27201089 HC SNARE, DISP (ANY): Performed by: INTERNAL MEDICINE

## 2022-11-17 PROCEDURE — 37000009 HC ANESTHESIA EA ADD 15 MINS: Performed by: INTERNAL MEDICINE

## 2022-11-17 PROCEDURE — 88305 TISSUE EXAM BY PATHOLOGIST: CPT | Mod: 26,,, | Performed by: PATHOLOGY

## 2022-11-17 PROCEDURE — 82962 GLUCOSE BLOOD TEST: CPT | Performed by: INTERNAL MEDICINE

## 2022-11-17 PROCEDURE — 63600175 PHARM REV CODE 636 W HCPCS: Performed by: NURSE ANESTHETIST, CERTIFIED REGISTERED

## 2022-11-17 PROCEDURE — 45385 PR COLONOSCOPY,REMV LESN,SNARE: ICD-10-PCS | Mod: PT,,, | Performed by: INTERNAL MEDICINE

## 2022-11-17 PROCEDURE — 25000003 PHARM REV CODE 250: Performed by: INTERNAL MEDICINE

## 2022-11-17 PROCEDURE — D9220A PRA ANESTHESIA: Mod: PT,ANES,, | Performed by: ANESTHESIOLOGY

## 2022-11-17 PROCEDURE — 88305 TISSUE EXAM BY PATHOLOGIST: CPT | Performed by: PATHOLOGY

## 2022-11-17 PROCEDURE — D9220A PRA ANESTHESIA: ICD-10-PCS | Mod: PT,ANES,, | Performed by: ANESTHESIOLOGY

## 2022-11-17 PROCEDURE — D9220A PRA ANESTHESIA: Mod: PT,CRNA,, | Performed by: NURSE ANESTHETIST, CERTIFIED REGISTERED

## 2022-11-17 PROCEDURE — 45385 COLONOSCOPY W/LESION REMOVAL: CPT | Mod: PT,,, | Performed by: INTERNAL MEDICINE

## 2022-11-17 PROCEDURE — D9220A PRA ANESTHESIA: ICD-10-PCS | Mod: PT,CRNA,, | Performed by: NURSE ANESTHETIST, CERTIFIED REGISTERED

## 2022-11-17 PROCEDURE — 45385 COLONOSCOPY W/LESION REMOVAL: CPT | Mod: PT | Performed by: INTERNAL MEDICINE

## 2022-11-17 RX ORDER — SODIUM CHLORIDE 0.9 % (FLUSH) 0.9 %
10 SYRINGE (ML) INJECTION
Status: DISCONTINUED | OUTPATIENT
Start: 2022-11-17 | End: 2022-11-17 | Stop reason: HOSPADM

## 2022-11-17 RX ORDER — LIDOCAINE HYDROCHLORIDE 20 MG/ML
INJECTION INTRAVENOUS
Status: DISCONTINUED | OUTPATIENT
Start: 2022-11-17 | End: 2022-11-17

## 2022-11-17 RX ORDER — SODIUM CHLORIDE 9 MG/ML
INJECTION, SOLUTION INTRAVENOUS CONTINUOUS
Status: DISCONTINUED | OUTPATIENT
Start: 2022-11-17 | End: 2022-11-17 | Stop reason: HOSPADM

## 2022-11-17 RX ORDER — FENTANYL CITRATE 50 UG/ML
25 INJECTION, SOLUTION INTRAMUSCULAR; INTRAVENOUS EVERY 5 MIN PRN
Status: DISCONTINUED | OUTPATIENT
Start: 2022-11-17 | End: 2022-11-17 | Stop reason: HOSPADM

## 2022-11-17 RX ORDER — PROPOFOL 10 MG/ML
VIAL (ML) INTRAVENOUS CONTINUOUS PRN
Status: DISCONTINUED | OUTPATIENT
Start: 2022-11-17 | End: 2022-11-17

## 2022-11-17 RX ORDER — PROPOFOL 10 MG/ML
VIAL (ML) INTRAVENOUS
Status: DISCONTINUED | OUTPATIENT
Start: 2022-11-17 | End: 2022-11-17

## 2022-11-17 RX ORDER — ONDANSETRON 2 MG/ML
4 INJECTION INTRAMUSCULAR; INTRAVENOUS DAILY PRN
Status: DISCONTINUED | OUTPATIENT
Start: 2022-11-17 | End: 2022-11-17 | Stop reason: HOSPADM

## 2022-11-17 RX ADMIN — Medication 150 MCG/KG/MIN: at 01:11

## 2022-11-17 RX ADMIN — PROPOFOL 50 MG: 10 INJECTION, EMULSION INTRAVENOUS at 01:11

## 2022-11-17 RX ADMIN — SODIUM CHLORIDE: 0.9 INJECTION, SOLUTION INTRAVENOUS at 12:11

## 2022-11-17 RX ADMIN — LIDOCAINE HYDROCHLORIDE 50 MG: 20 INJECTION INTRAVENOUS at 01:11

## 2022-11-17 NOTE — TRANSFER OF CARE
"Anesthesia Transfer of Care Note    Patient: Erasmo Cherry    Procedure(s) Performed: Procedure(s) (LRB):  COLONOSCOPY (N/A)    Patient location: [facility]    Anesthesia Type: MAC    Transport from OR: Transported from OR on room air with adequate spontaneous ventilation    Post pain: adequate analgesia    Post assessment: no apparent anesthetic complications    Post vital signs: stable    Level of consciousness: awake, alert and oriented    Nausea/Vomiting: no nausea/vomiting    Complications: none    Transfer of care protocol was followed      Last vitals:   Visit Vitals  BP (!) 174/84 (Patient Position: Lying)   Pulse (!) 52   Temp 36.7 °C (98.1 °F) (Temporal)   Resp 16   Ht 5' 7" (1.702 m)   Wt 81.6 kg (180 lb)   SpO2 99%   BMI 28.19 kg/m²     "

## 2022-11-17 NOTE — ANESTHESIA PREPROCEDURE EVALUATION
[facility]  Anesthesia Pre-Operative Evaluation       Patient Name: Erasmo Cherry  YOB: 1945  MRN: [id number]  [facility]: [id number]      Code Status: Full Code   Date of Procedure: 11/17/2022  Anesthesia: Choice Procedure: Procedure(s) (LRB):  COLONOSCOPY (N/A)  Pre-Operative Diagnosis: Screening for colon cancer [Z12.11]  History of colon polyps [Z86.010]  Proceduralist: Surgeon(s) and Role:     * Jeremy Seaman MD - Primary        SUBJECTIVE:   Erasmo Cherry is a 77 y.o. male who  has a past medical history of Anticoagulant long-term use, Arthritis, Atrial fibrillation, Basal cell carcinoma, BCC (basal cell carcinoma) (12/2020), BCC (basal cell carcinoma) (07/2021), Colon polyps, Diabetes mellitus, Guillain-[place], Hyperlipidemia (8/25/2017), Hypertension, Prostate CA, Prostate cancer, Sleep apnea, and Thyroid disease. No notes on file    Anticoagulants   Medication Route Frequency       he has a current medication list which includes the following long-term medication(s): atorvastatin, lisinopril, metformin, synthroid, nystatin-triamcinolone, and [DISCONTINUED] lancets.   ALLERGIES:   Review of patient's allergies indicates:  No Known Allergies  LDA:      Lines/Drains/Airways     Peripheral Intravenous Line  Duration                Peripheral IV - Single Lumen 11/17/22 1220 20 G Right Hand <1 day              MEDICATIONS:     Antibiotics (From admission, onward)    None        VTE Risk Mitigation (From admission, onward)    None        Current Facility-Administered Medications   Medication Dose Route Frequency Provider Last Rate Last Admin    0.9%  NaCl infusion   Intravenous Continuous Jeremy Seaman MD 20 mL/hr at 11/17/22 1222 New Bag at 11/17/22 1222    sodium chloride 0.9% flush 10 mL  10 mL Intravenous PRN Jeremy Seaman MD         Facility-Administered Medications Ordered in Other Encounters   Medication Dose Route Frequency Provider Last Rate Last Admin    sodium  chloride 0.9% flush 5 mL  5 mL Intravenous PRN Cathy Hickman NP              History:   There are no hospital problems to display for this patient.    Surgical History:    has a past surgical history that includes Prostatectomy; Excision basal cell carcinoma; Tonsillectomy; Colonoscopy (N/A, 4/10/2017); Cardioversion (N/A, 7/25/2018); and Treatment of cardiac arrhythmia (N/A, 2/6/2019).   Social History:    has no history on file for sexual activity.  reports that he has never smoked. He has never used smokeless tobacco. He reports current alcohol use.     OBJECTIVE:     Vital Signs (Most Recent):  Temp: 36.7 °C (98.1 °F) (11/17/22 1219)  Pulse: (!) 52 (11/17/22 1219)  Resp: 16 (11/17/22 1219)  BP: (!) 174/84 (11/17/22 1219)  SpO2: 99 % (11/17/22 1219) Vital Signs Range (Last 24H):  Temp:  [36.7 °C (98.1 °F)]   Pulse:  [52]   Resp:  [16]   BP: (174)/(84)   SpO2:  [99 %]        Body mass index is 28.19 kg/m².   Wt Readings from Last 4 Encounters:   11/17/22 81.6 kg (180 lb)   11/11/22 83.5 kg (184 lb)   06/06/22 82.4 kg (181 lb 9.6 oz)   03/21/22 87.1 kg (192 lb)     Significant Labs:  Lab Results   Component Value Date    WBC 7.43 02/01/2019    HGB 16.7 02/01/2019    HCT 49.0 02/01/2019     02/01/2019     06/02/2022    K 4.3 06/02/2022     06/02/2022    CREATININE 1.0 06/02/2022    BUN 16 06/02/2022    CO2 22 (L) 06/02/2022     (H) 06/02/2022    CALCIUM 10.1 06/02/2022    ALKPHOS 64 06/02/2022    ALT 15 06/02/2022    AST 23 06/02/2022    ALBUMIN 4.4 06/02/2022    INR 1.0 02/01/2019    APTT 33.6 (H) 02/01/2019    HGBA1C 7.6 (H) 06/02/2022     No LMP for male patient.  Recent Results (from the past 72 hour(s))   POCT glucose    Collection Time: 11/17/22 12:20 PM   Result Value Ref Range    POCT Glucose 161 (H) 70 - 110 mg/dL       EKG:   Results for orders placed or performed during the hospital encounter of 02/06/19   EKG 12-LEAD    Collection Time: 02/06/19  9:49 AM    Narrative     Test Reason :     Vent. Rate : 057 BPM     Atrial Rate : 057 BPM     P-R Int : 388 ms          QRS Dur : 156 ms      QT Int : 480 ms       P-R-T Axes : 027 040 -15 degrees     QTc Int : 467 ms    Sinus bradycardia with marked sinus arrythmia with 1st degree A-V block  Right bundle branch block  Abnormal ECG  When compared with ECG of 06-FEB-2019 06:28,  Sinus rhythm has replaced Atrial fibrillation  T wave inversion no longer evident in Anterior leads  Confirmed by REBECA MCDERMOTT MD ([id number]) on 2/6/2019 6:59:33 PM    Referred By: MICHELE CRUZ           Confirmed By:REBECA MCDERMOTT MD       TTE:  Results for orders placed or performed during the hospital encounter of 03/21/22   Echo   Result Value Ref Range    Ascending aorta 3.28 cm    STJ 2.64 cm    AV mean gradient 5 mmHg    Ao peak mercedes 1.44 m/s    Ao VTI 33.72 cm    IVS 0.92 0.6 - 1.1 cm    LA size 4.99 cm    Left Atrium Major Axis 6.95 cm    Left Atrium Minor Axis 6.49 cm    LVIDd 5.06 3.5 - 6.0 cm    LVIDs 3.23 2.1 - 4.0 cm    LVOT diameter 2.14 cm    LVOT peak VTI 15.99 cm    Posterior Wall 1.12 (A) 0.6 - 1.1 cm    MV Peak E Mercedes 0.97 m/s    RA Major Axis 6.37 cm    RA Width 5.48 cm    RVDD 5.21 cm    Sinus 2.82 cm    TAPSE 1.95 cm    TR Max Mercedes 2.26 m/s    TDI LATERAL 0.11 m/s    TDI SEPTAL 0.07 m/s    LA WIDTH 4.81 cm    LV Diastolic Volume 121.71 mL    LV Systolic Volume 41.77 mL    RV S' 9.53 cm/s    LVOT peak mercedes 0.73 m/s    LA volume (mod) 93.68 cm3    LV LATERAL E/E' RATIO 8.82 m/s    LV SEPTAL E/E' RATIO 13.86 m/s    FS 36 %    LA volume 136.94 cm3    LV mass 190.59 g    Left Ventricle Relative Wall Thickness 0.44 cm    AV valve area 1.70 cm2    AV Velocity Ratio 0.51     AV index (prosthetic) 0.47     Mean e' 0.09 m/s    LVOT area 3.6 cm2    LVOT stroke volume 57.48 cm3    AV peak gradient 8 mmHg    E/E' ratio 10.78 m/s    Triscuspid Valve Regurgitation Peak Gradient 20 mmHg    BSA 2.04 m2    LV Systolic Volume Index 20.8 mL/m2    LV Diastolic Volume  Index 60.55 mL/m2    LA Volume Index 68.1 mL/m2    LV Mass Index 95 g/m2    LA Volume Index (Mod) 46.6 mL/m2    Right Atrial Pressure (from IVC) 8 mmHg    EF 55 %    RA area 32.00 cm2    TV rest pulmonary artery pressure 28 mmHg    Narrative    · Bradycardia was present during the study. HR mid 40s  · The estimated ejection fraction is 55%.  · The left ventricle is normal in size with concentric remodeling and   normal systolic function.  · Normal left ventricular diastolic function.  · Normal right ventricular size with normal right ventricular systolic   function.  · Severe left atrial enlargement.  · Moderate right atrial enlargement.  · Mild tricuspid regurgitation.  · The estimated PA systolic pressure is 28 mmHg.  · Intermediate central venous pressure (8 mmHg).        EF   Date Value Ref Range Status   03/21/2022 55 % Final      Results for orders placed or performed during the hospital encounter of 01/19/17   2D echo with color flow doppler   Result Value Ref Range    EF + QEF 40 (A) 55 - 65    Mitral Valve Regurgitation TRIVIAL     Diastolic Dysfunction Yes (A)     Aortic Valve Regurgitation TRIVIAL     Est. PA Systolic Pressure 33.4     Mitral Valve Mobility SYSTOLIC FLATTENING     Tricuspid Valve Regurgitation MILD      NEVAEH:  No results found for this or any previous visit.  Stress Test:  No results found for this or any previous visit.     LHC:  No results found for this or any previous visit.     PFT:  No results found for: FEV1, FVC, NRL8URV, TLC, DLCO   ASSESSMENT/PLAN:         Pre-op Assessment    I have reviewed the Patient Summary Reports.     I have reviewed the Nursing Notes.    I have reviewed the Medications.     Review of Systems  Anesthesia Hx:  No problems with previous Anesthesia    Hematology/Oncology:  Hematology Normal   Oncology Normal     EENT/Dental:EENT/Dental Normal   Cardiovascular:   Exercise tolerance: good Hypertension Dysrhythmias    Pulmonary:   Sleep Apnea     Renal/:  Renal/ Normal     Hepatic/GI:   Liver Disease,    Musculoskeletal:  Musculoskeletal Normal    Neurological:   Neuromuscular Disease,    Endocrine:   Diabetes    Dermatological:  Skin Normal    Psych:  Psychiatric Normal           Physical Exam  General: Well nourished    Airway:  Mallampati: III / II  Mouth Opening: Normal  TM Distance: Normal  Tongue: Normal  Neck ROM: Normal ROM    Dental:  Intact        Anesthesia Plan  Type of Anesthesia, risks & benefits discussed:    Anesthesia Type: Gen ETT, Gen Natural Airway  Intra-op Monitoring Plan: Standard ASA Monitors  Post Op Pain Control Plan: multimodal analgesia and IV/PO Opioids PRN  Induction:  IV  Airway Plan: Direct and Video, Post-Induction  Informed Consent: Informed consent signed with the Patient and all parties understand the risks and agree with anesthesia plan.  All questions answered.   ASA Score: 3  Day of Surgery Review of History & Physical: H&P completed by Anesthesiologist.  Anesthesia Plan Notes: Chart reviewed, patient interviewed and examined.  The anesthetic plan was explained.  Risks, benefits, and alternatives were discussed. Questions were answered and the consent was signed.        KALANI Cortez M.D.         Ready For Surgery From Anesthesia Perspective.     .

## 2022-11-17 NOTE — H&P
Short Stay Endoscopy History and Physical    PCP - Chichi Stack MD  Referring Physician - Chichi Stack MD  [address]  [address]  [place] [address]    Procedure - colonoscopy  ASA - per anesthesia  Mallampati - per anesthesia  History of Anesthesia problems - no  Family history Anesthesia problems -  no   Plan of anesthesia - General    HPI:  This is a 77 y.o. male here for evaluation of: screening    Reflux - no  Dysphagia - no  Abdominal pain - no  Diarrhea - no    ROS:  Constitutional: No fevers, chills, No weight loss  CV: No chest pain  Pulm: No cough, No shortness of breath  Ophtho: No vision changes  GI: see HPI  Derm: No rash    Medical History:  has a past medical history of Anticoagulant long-term use, Arthritis, Atrial fibrillation, Basal cell carcinoma, BCC (basal cell carcinoma) (12/2020), BCC (basal cell carcinoma) (07/2021), Colon polyps, Diabetes mellitus, Guillain-[place], Hyperlipidemia (8/25/2017), Hypertension, Prostate CA, Prostate cancer, Sleep apnea, and Thyroid disease.    Surgical History:  has a past surgical history that includes Prostatectomy; Excision basal cell carcinoma; Tonsillectomy; Colonoscopy (N/A, 4/10/2017); Cardioversion (N/A, 7/25/2018); and Treatment of cardiac arrhythmia (N/A, 2/6/2019).    Family History: family history includes Asthma in his sister; Diabetes in his mother and sister; Heart disease in his father..    Social History:  reports that he has never smoked. He has never used smokeless tobacco. He reports current alcohol use.    Review of patient's allergies indicates:  No Known Allergies    Medications:   Medications Prior to Admission   Medication Sig Dispense Refill Last Dose    atorvastatin (LIPITOR) 20 MG tablet TAKE 1 TABLET DAILY 90 tablet 3 11/16/2022    dulaglutide (TRULICITY) 4.5 mg/0.5 mL pen injector INJECT 4.5 MG INTO THE SKIN EVERY 7 DAYS 4 pen 3 11/11/2022    ergocalciferol (VITAMIN D2) 50,000 unit Cap TAKE 1 CAPSULE  EVERY 7 DAYS 15 capsule 3 11/16/2022    lisinopriL (PRINIVIL,ZESTRIL) 20 MG tablet TAKE 1 TABLET DAILY 90 tablet 3 11/16/2022    metFORMIN (GLUCOPHAGE-XR) 750 MG ER 24hr tablet TAKE 1 TABLET TWICE A DAY WITH MEALS 180 tablet 3 11/16/2022    metoprolol succinate (TOPROL-XL) 25 MG 24 hr tablet Take 0.5 tablets by mouth Daily.   11/16/2022    SYNTHROID 88 mcg tablet TAKE 1 TABLET DAILY BEFORE BREAKFAST 90 tablet 3 11/16/2022    vitamin E 800 UNIT capsule Take 800 Units by mouth once daily.   11/16/2022    blood sugar diagnostic (FREESTYLE LITE STRIPS) Strp USE TO TEST TWICE A DAY AS NEEDED 200 strip 3     ELIQUIS 5 mg Tab TAKE 1 TABLET TWICE A  tablet 3 11/14/2022    FREESTYLE AURE 14 DAY READER Misc TEST BLOOD SUGAR BID. 6 each 6     FREESTYLE AURE 14 DAY SENSOR Kit TEST TWICE DAILY; REMOVE AND REPLACE EVERY 2 WEEKS 2 kit 1     nystatin-triamcinolone (MYCOLOG) ointment           Physical Exam:    Vital Signs:   Vitals:    11/17/22 1219   BP: (!) 174/84   Pulse: (!) 52   Resp: 16   Temp: 98.1 °F (36.7 °C)       General Appearance: Well appearing in no acute distress    Labs:  Lab Results   Component Value Date    WBC 7.43 02/01/2019    HGB 16.7 02/01/2019    HCT 49.0 02/01/2019     02/01/2019    CHOL 104 (L) 03/03/2021    TRIG 66 03/03/2021    HDL 46 03/03/2021    ALT 15 06/02/2022    AST 23 06/02/2022     06/02/2022    K 4.3 06/02/2022     06/02/2022    CREATININE 1.0 06/02/2022    BUN 16 06/02/2022    CO2 22 (L) 06/02/2022    TSH 1.045 06/02/2022    PSA <0.01 11/05/2020    INR 1.0 02/01/2019    HGBA1C 7.6 (H) 06/02/2022       I have explained the risks and benefits of this endoscopic procedure to the patient including but not limited to bleeding, inflammation, infection, perforation, and death.      Darell Jeter MD

## 2022-11-17 NOTE — [employer] PATIENT INSTRUCTIONS
Discharge Summary/Instructions after an Endoscopic Procedure  Patient Name: Erasmo Cherry  Patient MRN: [id number]  Patient YOB: 1945 Thursday, November 17, 2022  Darell Jeter MD  Dear patient,  As a result of recent federal legislation (The Federal Cures Act), you may   receive lab or pathology results from your procedure in your [facility]   account before your physician is able to contact you. Your physician or   their representative will relay the results to you with their   recommendations at their soonest availability.  Thank you,  RESTRICTIONS:  During your procedure today, you received medications for sedation.  These   medications may affect your judgment, balance and coordination.  Therefore,   for 24 hours, you have the following restrictions:   - DO NOT drive a car, operate machinery, make legal/financial decisions,   sign important papers or drink alcohol.    ACTIVITY:  Today: no heavy lifting, straining or running due to procedural   sedation/anesthesia.  The following day: return to full activity including work.  DIET:  Eat and drink normally unless instructed otherwise.     TREATMENT FOR COMMON SIDE EFFECTS:  - Mild abdominal pain, nausea, belching, bloating or excessive gas:  rest,   eat lightly and use a heating pad.  - Sore Throat: treat with throat lozenges and/or gargle with warm salt   water.  - Because air was used during the procedure, expelling large amounts of air   from your rectum or belching is normal.  - If a bowel prep was taken, you may not have a bowel movement for 1-3 days.    This is normal.  SYMPTOMS TO WATCH FOR AND REPORT TO YOUR PHYSICIAN:  1. Abdominal pain or bloating, other than gas cramps.  2. Chest pain.  3. Back pain.  4. Signs of infection such as: chills or fever occurring within 24 hours   after the procedure.  5. Rectal bleeding, which would show as bright red, maroon, or black stools.   (A tablespoon of blood from the rectum is not serious, especially if    hemorrhoids are present.)  6. Vomiting.  7. Weakness or dizziness.  GO DIRECTLY TO THE NEAREST EMERGENCY ROOM IF YOU HAVE ANY OF THE FOLLOWING:      Difficulty breathing              Chills and/or fever over 101 F   Persistent vomiting and/or vomiting blood   Severe abdominal pain   Severe chest pain   Black, tarry stools   Bleeding- more than one tablespoon   Any other symptom or condition that you feel may need urgent attention  Your doctor recommends these additional instructions:  If any biopsies were taken, your doctors clinic will contact you in 1 to 2   weeks with any results.  - Discharge patient to home.   - Resume previous diet.   - Continue present medications.   - Await pathology results.   - Repeat colonoscopy in 5 years for screening purposes.   - Resume Eliquis (apixaban) at prior dose tomorrow.  For questions, problems or results please call your physician - Darell Jeter MD at Work:  [phone].  [facility], EMERGENCY ROOM PHONE NUMBER: [phone]  IF A COMPLICATION OR EMERGENCY SITUATION ARISES AND YOU ARE UNABLE TO REACH   YOUR PHYSICIAN - GO DIRECTLY TO THE EMERGENCY ROOM.  Darell Jeter MD  11/17/2022 1:59:20 PM  This report has been verified and signed electronically.  Dear patient,  As a result of recent federal legislation (The Federal Cures Act), you may   receive lab or pathology results from your procedure in your [facility]   account before your physician is able to contact you. Your physician or   their representative will relay the results to you with their   recommendations at their soonest availability.  Thank you,  [employer]

## 2022-11-18 NOTE — ANESTHESIA POSTPROCEDURE EVALUATION
Anesthesia Post Evaluation    Patient: Erasmo Cherry    Procedure(s) Performed: Procedure(s) (LRB):  COLONOSCOPY (N/A)    Final Anesthesia Type: general      Patient location during evaluation: PACU  Patient participation: Yes- Able to Participate  Level of consciousness: awake and alert  Post-procedure vital signs: reviewed and stable  Pain management: adequate  Airway patency: patent    PONV status at discharge: No PONV  Anesthetic complications: no      Cardiovascular status: blood pressure returned to baseline  Respiratory status: unassisted  Hydration status: euvolemic  Follow-up not needed.          Vitals Value Taken Time   /67 11/17/22 1446   Temp 36.7 °C (98 °F) 11/17/22 1415   Pulse 42 11/17/22 1453   Resp 20 11/17/22 1453   SpO2 100 % 11/17/22 1453   Vitals shown include unvalidated device data.      No case tracking events are documented in the log.      Pain/April Score: April Score: 9 (11/17/2022  2:15 PM)

## 2022-11-29 LAB
FINAL PATHOLOGIC DIAGNOSIS: NORMAL
GROSS: NORMAL
Lab: NORMAL

## 2023-10-06 ENCOUNTER — LAB VISIT (OUTPATIENT)
Dept: LAB | Facility: OTHER | Age: 78
End: 2023-10-06
Attending: INTERNAL MEDICINE
Payer: MEDICARE

## 2023-10-06 DIAGNOSIS — E03.4 HYPOTHYROIDISM DUE TO ACQUIRED ATROPHY OF THYROID: ICD-10-CM

## 2023-10-06 DIAGNOSIS — E53.8 B12 DEFICIENCY: ICD-10-CM

## 2023-10-06 DIAGNOSIS — I10 ESSENTIAL HYPERTENSION: ICD-10-CM

## 2023-10-06 DIAGNOSIS — G47.33 OBSTRUCTIVE SLEEP APNEA: ICD-10-CM

## 2023-10-06 DIAGNOSIS — E11.65 TYPE 2 DIABETES MELLITUS WITH HYPERGLYCEMIA, UNSPECIFIED WHETHER LONG TERM INSULIN USE: ICD-10-CM

## 2023-10-06 LAB
ALBUMIN SERPL BCP-MCNC: 4.5 G/DL (ref 3.5–5.2)
ALP SERPL-CCNC: 88 U/L (ref 55–135)
ALT SERPL W/O P-5'-P-CCNC: 11 U/L (ref 10–44)
ANION GAP SERPL CALC-SCNC: 14 MMOL/L (ref 8–16)
AST SERPL-CCNC: 21 U/L (ref 10–40)
BILIRUB SERPL-MCNC: 2.8 MG/DL (ref 0.1–1)
BUN SERPL-MCNC: 12 MG/DL (ref 8–23)
CALCIUM SERPL-MCNC: 9.7 MG/DL (ref 8.7–10.5)
CHLORIDE SERPL-SCNC: 105 MMOL/L (ref 95–110)
CHOLEST SERPL-MCNC: 89 MG/DL (ref 120–199)
CHOLEST/HDLC SERPL: 2.2 {RATIO} (ref 2–5)
CO2 SERPL-SCNC: 23 MMOL/L (ref 23–29)
CREAT SERPL-MCNC: 0.9 MG/DL (ref 0.5–1.4)
EST. GFR  (NO RACE VARIABLE): >60 ML/MIN/1.73 M^2
ESTIMATED AVG GLUCOSE: 269 MG/DL (ref 68–131)
GLUCOSE SERPL-MCNC: 101 MG/DL (ref 70–110)
HBA1C MFR BLD: 11 % (ref 4–5.6)
HDLC SERPL-MCNC: 40 MG/DL (ref 40–75)
HDLC SERPL: 44.9 % (ref 20–50)
LDLC SERPL CALC-MCNC: 35.4 MG/DL (ref 63–159)
NONHDLC SERPL-MCNC: 49 MG/DL
POTASSIUM SERPL-SCNC: 3.8 MMOL/L (ref 3.5–5.1)
PROT SERPL-MCNC: 7.5 G/DL (ref 6–8.4)
SODIUM SERPL-SCNC: 142 MMOL/L (ref 136–145)
T4 FREE SERPL-MCNC: 1.33 NG/DL (ref 0.71–1.51)
TRIGL SERPL-MCNC: 68 MG/DL (ref 30–150)
TSH SERPL DL<=0.005 MIU/L-ACNC: 1.04 UIU/ML (ref 0.4–4)
VIT B12 SERPL-MCNC: 452 PG/ML (ref 210–950)

## 2023-10-06 PROCEDURE — 36415 COLL VENOUS BLD VENIPUNCTURE: CPT | Performed by: INTERNAL MEDICINE

## 2023-10-06 PROCEDURE — 82607 VITAMIN B-12: CPT | Performed by: INTERNAL MEDICINE

## 2023-10-06 PROCEDURE — 84439 ASSAY OF FREE THYROXINE: CPT | Performed by: INTERNAL MEDICINE

## 2023-10-06 PROCEDURE — 80061 LIPID PANEL: CPT | Performed by: INTERNAL MEDICINE

## 2023-10-06 PROCEDURE — 83036 HEMOGLOBIN GLYCOSYLATED A1C: CPT | Performed by: INTERNAL MEDICINE

## 2023-10-06 PROCEDURE — 84443 ASSAY THYROID STIM HORMONE: CPT | Performed by: INTERNAL MEDICINE

## 2023-10-06 PROCEDURE — 80053 COMPREHEN METABOLIC PANEL: CPT | Performed by: INTERNAL MEDICINE

## 2023-10-10 ENCOUNTER — HOSPITAL ENCOUNTER (OUTPATIENT)
Dept: RADIOLOGY | Facility: OTHER | Age: 78
Discharge: HOME | End: 2023-10-10
Attending: INTERNAL MEDICINE
Payer: MEDICARE

## 2023-10-10 ENCOUNTER — TELEPHONE (OUTPATIENT)
Dept: ELECTROPHYSIOLOGY | Facility: CLINIC | Age: 78
End: 2023-10-10
Payer: MEDICARE

## 2023-10-10 DIAGNOSIS — I48.19 PERSISTENT ATRIAL FIBRILLATION: Primary | ICD-10-CM

## 2023-10-10 DIAGNOSIS — R49.0 HOARSENESS: ICD-10-CM

## 2023-10-10 PROCEDURE — 71046 X-RAY EXAM CHEST 2 VIEWS: CPT | Mod: 26,,, | Performed by: STUDENT IN AN ORGANIZED HEALTH CARE EDUCATION/TRAINING PROGRAM

## 2023-10-10 PROCEDURE — 71046 XR CHEST PA AND LATERAL: ICD-10-PCS | Mod: 26,,, | Performed by: STUDENT IN AN ORGANIZED HEALTH CARE EDUCATION/TRAINING PROGRAM

## 2023-10-10 PROCEDURE — 71046 X-RAY EXAM CHEST 2 VIEWS: CPT | Mod: TC,FY

## 2023-10-23 ENCOUNTER — OFFICE VISIT (OUTPATIENT)
Dept: CARDIOLOGY | Facility: CLINIC | Age: 78
End: 2023-10-23
Payer: MEDICARE

## 2023-10-23 VITALS
OXYGEN SATURATION: 98 % | SYSTOLIC BLOOD PRESSURE: 140 MMHG | DIASTOLIC BLOOD PRESSURE: 64 MMHG | BODY MASS INDEX: 26.64 KG/M2 | HEART RATE: 44 BPM | HEIGHT: 67 IN | WEIGHT: 169.75 LBS

## 2023-10-23 DIAGNOSIS — I48.19 PERSISTENT ATRIAL FIBRILLATION: ICD-10-CM

## 2023-10-23 DIAGNOSIS — I10 ESSENTIAL HYPERTENSION: Primary | ICD-10-CM

## 2023-10-23 DIAGNOSIS — I48.19 PERSISTENT ATRIAL FIBRILLATION: Primary | ICD-10-CM

## 2023-10-23 PROCEDURE — 99205 PR OFFICE/OUTPT VISIT, NEW, LEVL V, 60-74 MIN: ICD-10-PCS | Mod: S$PBB,,, | Performed by: HOSPITALIST

## 2023-10-23 PROCEDURE — 99214 OFFICE O/P EST MOD 30 MIN: CPT | Mod: PBBFAC | Performed by: HOSPITALIST

## 2023-10-23 PROCEDURE — 93010 EKG 12-LEAD: ICD-10-PCS | Mod: S$PBB,,, | Performed by: INTERNAL MEDICINE

## 2023-10-23 PROCEDURE — 93005 ELECTROCARDIOGRAM TRACING: CPT | Mod: PBBFAC | Performed by: INTERNAL MEDICINE

## 2023-10-23 PROCEDURE — 99205 OFFICE O/P NEW HI 60 MIN: CPT | Mod: S$PBB,,, | Performed by: HOSPITALIST

## 2023-10-23 PROCEDURE — 93010 ELECTROCARDIOGRAM REPORT: CPT | Mod: S$PBB,,, | Performed by: INTERNAL MEDICINE

## 2023-10-23 PROCEDURE — 99999 PR PBB SHADOW E&M-EST. PATIENT-LVL IV: CPT | Mod: PBBFAC,,, | Performed by: HOSPITALIST

## 2023-10-23 PROCEDURE — 99999 PR PBB SHADOW E&M-EST. PATIENT-LVL IV: ICD-10-PCS | Mod: PBBFAC,,, | Performed by: HOSPITALIST

## 2023-10-23 NOTE — PROGRESS NOTES
Cardiology Clinic Note  Reason for Visit: Follow-up          HPI:   78-year-old male past medical history of HFrEF which is recovered now , atrial fibrillation currently being managed with rate control and is on anticoagulation with Eliquis, hypertension presents to establish care with General Cardiology.  Patient currently denies having any symptoms.  He is able to exercise at least 5 times in a week where he rates the bike and denies having chest pain, shortness for breath palpitations, presyncopal or syncopal events.  He also denies having any orthopnea, PND, bilateral lower extremity edema.    ROS:      Review of Systems   Constitutional: Negative.    HENT: Negative.     Eyes: Negative.    Respiratory: Negative.     Cardiovascular: Negative.    Gastrointestinal: Negative.    Genitourinary: Negative.    Musculoskeletal: Negative.    Skin: Negative.    Neurological: Negative.        PMH:     Past Medical History:   Diagnosis Date    Anticoagulant long-term use     Arthritis     Atrial fibrillation     Basal cell carcinoma     right upper back, left upper arm     BCC (basal cell carcinoma) 12/2020    right temporal scalp    BCC (basal cell carcinoma) 07/2021    left upper back     Colon polyps     tubular adenoma, repeat in 2016    Diabetes mellitus     Guillain-[place]     patient denies history of this condition    Hyperlipidemia 8/25/2017    Hypertension     Prostate CA     Prostate cancer     Sleep apnea     Thyroid disease        PSH:     Past Surgical History:   Procedure Laterality Date    BASAL CELL CARCINOMA EXCISION      CARDIOVERSION N/A 7/25/2018    Procedure: CARDIOVERSION;  Surgeon: Harvey Blake MD;  Location: [facility] CATH LAB;  Service: Cardiology;  Laterality: N/A;  AF, NEVAEH/DCCV, MAC, SK, 3 Prep    COLONOSCOPY N/A 4/10/2017    Procedure: COLONOSCOPY;  Surgeon: Rubin Solano MD;  Location: [facility] ENDO ([address] [place] [address]);  Service: Endoscopy;  Laterality: N/A;    COLONOSCOPY N/A 11/17/2022    Procedure: COLONOSCOPY;   Surgeon: Darell Jeter MD;  Location: [facility] ENDO (2ND FLR);  Service: Endoscopy;  Laterality: N/A;  Extensive cardiac hx with h/o sina, instr. via portal - PC    PROSTATECTOMY      TONSILLECTOMY      TREATMENT OF CARDIAC ARRHYTHMIA N/A 2/6/2019    Procedure: CARDIOVERSION;  Surgeon: Harvey Blake MD;  Location: [facility] EP LAB;  Service: Cardiology;  Laterality: N/A;  AF, DCCV, MAC, SK, 3 PREP     Allergies:   Review of patient's allergies indicates:  No Known Allergies  Medications:     Current Outpatient Medications on File Prior to Visit   Medication Sig Dispense Refill    apixaban (ELIQUIS) 5 mg Tab Take 1 tablet (5 mg total) by mouth 2 (two) times daily. 180 tablet 1    atorvastatin (LIPITOR) 20 MG tablet Take 1 tablet (20 mg total) by mouth once daily. 90 tablet 3    ergocalciferol (VITAMIN D2) 50,000 unit Cap Take twice a week. 24 capsule 3    FREESTYLE AURE 14 DAY READER Misc TEST BLOOD SUGAR BID. 6 each 6    FREESTYLE AURE 14 DAY SENSOR Kit USE TO TEST TWICE DAILY REMOVE AND REPLACE EEVRY 2 WEEKS 2 kit 3    FREESTYLE AURE 14 DAY SENSOR Kit USE TO TEST BLOOD SUGAR TWICE DAILY AND REPLACE EVERY 2 WEEKS 2 kit 6    levothyroxine (SYNTHROID) 88 MCG tablet Take 1 tablet (88 mcg total) by mouth before breakfast. 90 tablet 3    lisinopriL (PRINIVIL,ZESTRIL) 40 MG tablet TAKE 1 TABLET DAILY 90 tablet 3    metFORMIN (GLUCOPHAGE-XR) 750 MG ER 24hr tablet Take 1 tablet (750 mg total) by mouth 2 (two) times daily with meals. 180 tablet 3    metoprolol succinate (TOPROL-XL) 25 MG 24 hr tablet TAKE 1 TABLET DAILY 90 tablet 3    tirzepatide (MOUNJARO) 5 mg/0.5 mL PnIj INJECT 5 MG UNDER THE SKIN EVERY 7 DAYS 12 pen 3    vitamin E 800 UNIT capsule Take 800 Units by mouth once daily.      [DISCONTINUED] lancets (ONE TOUCH ULTRASOFT LANCETS) Misc 1 each by Misc.(Non-Drug; Combo Route) route 2 (two) times daily as needed. 100 each 2     Current Facility-Administered Medications on File Prior to Visit   Medication Dose Route  "Frequency Provider Last Rate Last Admin    sodium chloride 0.9% flush 5 mL  5 mL Intravenous PRN Cathy Hickman NP         Social History:     Social History     Tobacco Use    Smoking status: Never    Smokeless tobacco: Never   Substance Use Topics    Alcohol use: Yes     Comment: socially     Family History:     Family History   Problem Relation Age of Onset    Diabetes Mother     Heart disease Father     Asthma Sister     Diabetes Sister     Skin cancer Neg Hx     Melanoma Neg Hx      Physical Exam:   BP (!) 140/64   Pulse (!) 44   Ht 5' 7" (1.702 m)   Wt 77 kg (169 lb 12.1 oz)   SpO2 98%   BMI 26.59 kg/m²      Physical Exam  Constitutional:       Appearance: Normal appearance.   HENT:      Head: Normocephalic and atraumatic.      Mouth/Throat:      Mouth: Mucous membranes are moist.   Cardiovascular:      Rate and Rhythm: Normal rate and regular rhythm.   Pulmonary:      Effort: Pulmonary effort is normal.      Breath sounds: Normal breath sounds.   Abdominal:      General: Abdomen is flat. Bowel sounds are normal.      Palpations: Abdomen is soft.   Musculoskeletal:         General: Normal range of motion.      Cervical back: Normal range of motion.   Skin:     General: Skin is warm.      Capillary Refill: Capillary refill takes less than 2 seconds.   Neurological:      General: No focal deficit present.      Mental Status: He is alert and oriented to person, place, and time.         Notable Labs:     Lab Results   Component Value Date     10/06/2023    K 3.8 10/06/2023     10/06/2023    CO2 23 10/06/2023    BUN 12 10/06/2023    CREATININE 0.9 10/06/2023    ANIONGAP 14 10/06/2023     Lab Results   Component Value Date    HGBA1C 11.0 (H) 10/06/2023     No results found for: "BNP", "BNPTRIAGEBLO" Lab Results   Component Value Date    WBC 7.43 02/01/2019    HGB 16.7 02/01/2019    HCT 49.0 02/01/2019     02/01/2019    GRAN 4.1 02/01/2019    GRAN 54.7 02/01/2019     Lab Results "   Component Value Date    CHOL 89 (L) 10/06/2023    HDL 40 10/06/2023    LDLCALC 35.4 (L) 10/06/2023    TRIG 68 10/06/2023          Imaging:     EF   Date Value Ref Range Status   03/21/2022 55 % Final       EKG:  Atrial fibrillation with RBBB and  junctional escapes  Assessment:     1. Essential hypertension    2. Persistent atrial fibrillation        Plan:     Erasmo was seen today for follow-up.    Diagnoses and all orders for this visit:    Persistent atrial fibrillation  Patient on examination was found to have a regular heart rate for which I ordered an EKG that showed atrial fibrillation with junctional escapes.  His heart rate is around 40s.  Patient denies having any symptoms currently.  I made him exercise in the clinic in his heart rate increased appropriately.  For now will continue the same dose of Toprol that patient is currently taking.  Advised him to stop taking Toprol if he notices any presyncopal or syncopal events.  He is on anticoagulation with Eliquis      HFrEF  Patient has a drop in his EF back in 2018 which recovered later.  On his last echo from 1 year back his EF was 55%  Continue lisinopril, Toprol-XL    History of hypertension - well controlled.  continue lisinopril Toprol-XL    Hyperlipidemia- Cont statins. Last LDL is 35.4      The patient Erasmo Cherry case  was discussed with attending provider Dr. Mullen.  Return to clinic in 6 months     Jose Bates MD  Cardiology Fellow

## 2023-10-27 NOTE — PROGRESS NOTES
I have reviewed the patient's chart and the fellow's clinic note, as well as discussed the case with the fellow. I agree with the findings, assessment, and plan.      Wily Mullen MD  Consultative Cardiology - [facility] Esc[facility]  .

## 2024-01-17 ENCOUNTER — OFFICE VISIT (OUTPATIENT)
Dept: DERMATOLOGY | Facility: CLINIC | Age: 79
End: 2024-01-17
Payer: MEDICARE

## 2024-01-17 DIAGNOSIS — L57.0 AK (ACTINIC KERATOSIS): ICD-10-CM

## 2024-01-17 DIAGNOSIS — M67.449 DIGITAL MUCOUS CYST: Primary | ICD-10-CM

## 2024-01-17 PROCEDURE — 99214 OFFICE O/P EST MOD 30 MIN: CPT | Mod: PBBFAC,PO | Performed by: DERMATOLOGY

## 2024-01-17 PROCEDURE — 99999 PR PBB SHADOW E&M-EST. PATIENT-LVL IV: CPT | Mod: PBBFAC,,, | Performed by: DERMATOLOGY

## 2024-01-17 PROCEDURE — 99214 OFFICE O/P EST MOD 30 MIN: CPT | Mod: 25,S$PBB,, | Performed by: DERMATOLOGY

## 2024-01-17 PROCEDURE — 17000 DESTRUCT PREMALG LESION: CPT | Mod: PBBFAC,PO | Performed by: DERMATOLOGY

## 2024-01-17 PROCEDURE — 17000 DESTRUCT PREMALG LESION: CPT | Mod: S$PBB,,, | Performed by: DERMATOLOGY

## 2024-01-17 RX ORDER — FLUOROURACIL 50 MG/G
CREAM TOPICAL
Qty: 40 G | Refills: 1 | Status: SHIPPED | OUTPATIENT
Start: 2024-01-17 | End: 2024-03-25

## 2024-01-17 NOTE — PATIENT INSTRUCTIONS
Efudex/Florouracil treatment: right forehed/temple/cheek  2x per day for 7 days  Discussed to treat as directed and that area will be red, inflamed, and irritated which is a normal reaction.  Medication should be discontinued if area treated is ulcerated or bleeding.  Pt should wait 2 weeks to use medication if areas that are to be treated have also been treated with cryosurgery/freezing. Pt should avoid medication contacting eyes or mouth and wear sunscreen,  sun protective clothing, hat,  and sunglasses if going to be in the sun while undergoing treatment.  Pt can send photo or call if concerned about reaction.    F/u 4-6 months for skin check

## 2024-01-17 NOTE — PROGRESS NOTES
Subjective:      Patient ID:  Erasmo Cherry is a 78 y.o. male who presents for   Chief Complaint   Patient presents with    Skin Check     Spot check on R middle finger     History of Present Illness: The patient presents for spot check.    The patient was last seen on: 6/23/2021 for bx of L upper back c/w BCC.    Patient with new complaint of lesion(s)  Location: R middle finger  Duration: 1 year  Symptoms: painful to touch, red  Relieving factors/Previous treatments: antibiotic cream helps but does not resolve lesion.             Review of Systems   Skin:  Positive for dry skin, activity-related sunscreen use, recent sunburn and wears hat (most of the time). Negative for daily sunscreen use.   Hematologic/Lymphatic: Bruises/bleeds easily.       Objective:   Physical Exam   Skin:   Areas Examined (abnormalities noted in diagram):   Head / Face Inspection Performed  RUE Inspected  Nails and Digits Inspection Performed                Diagram Legend     Erythematous scaling macule/papule c/w actinic keratosis       Vascular papule c/w angioma      Pigmented verrucoid papule/plaque c/w seborrheic keratosis      Yellow umbilicated papule c/w sebaceous hyperplasia      Irregularly shaped tan macule c/w lentigo     1-2 mm smooth white papules consistent with Milia      Movable subcutaneous cyst with punctum c/w epidermal inclusion cyst      Subcutaneous movable cyst c/w pilar cyst      Firm pink to brown papule c/w dermatofibroma      Pedunculated fleshy papule(s) c/w skin tag(s)      Evenly pigmented macule c/w junctional nevus     Mildly variegated pigmented, slightly irregular-bordered macule c/w mildly atypical nevus      Flesh colored to evenly pigmented papule c/w intradermal nevus       Pink pearly papule/plaque c/w basal cell carcinoma      Erythematous hyperkeratotic cursted plaque c/w SCC      Surgical scar with no sign of skin cancer recurrence      Open and closed comedones      Inflammatory papules and  pustules      Verrucoid papule consistent consistent with wart     Erythematous eczematous patches and plaques     Dystrophic onycholytic nail with subungual debris c/w onychomycosis     Umbilicated papule    Erythematous-base heme-crusted tan verrucoid plaque consistent with inflamed seborrheic keratosis     Erythematous Silvery Scaling Plaque c/w Psoriasis     See annotation      Assessment / Plan:        Digital mucous cyst  -     Ambulatory referral/consult to Orthopedics; Future; Expected date: 01/24/2024  This is a cyst that forms from the fluid in the joint space.  It can be associated with arthritis in a joint. Removal is not necessary unless the lesions is painful/symptomatic. If removal is desired, it needs to be treated by hand/foot orthopedics for definitive removal of the cyst and ligation of the connection to the joint space.    AK (actinic keratosis)  Cryosurgery Procedure Note    Verbal consent from the patient is obtained including, but not limited to, risk of hypopigmentation/hyperpigmentation, scar, recurrence of lesion. The patient is aware of the precancerous quality and need for treatment of these lesions. Liquid nitrogen cryosurgery is applied to the 1 actinic keratoses, as detailed in the physical exam, to produce a freeze injury. The patient is aware that blisters may form and is instructed on wound care with gentle cleansing and use of vaseline ointment to keep moist until healed. The patient is supplied a handout on cryosurgery and is instructed to call if lesions do not completely resolve.    -     fluorouraciL (EFUDEX) 5 % cream; Mix with calcipotriene 0.005% cream  and Apply thin film to right forehead and temple and cheek  2 times per day for 7 days; d/c if area bleeding or ulcerated; avoid eyes or mouth  Dispense: 40 g; Refill: 1  Efudex/Florouracil treatment: right forehed/temple/cheek  2x per day for 7 days  Discussed to treat as directed and that area will be red, inflamed, and  irritated which is a normal reaction.  Medication should be discontinued if area treated is ulcerated or bleeding.  Pt should wait 2 weeks to use medication if areas that are to be treated have also been treated with cryosurgery/freezing. Pt should avoid medication contacting eyes or mouth and wear sunscreen,  sun protective clothing, hat,  and sunglasses if going to be in the sun while undergoing treatment.  Pt can send photo or call if concerned about reaction.           Follow up in about 6 months (around 7/17/2024) for UBSE.

## 2024-01-18 ENCOUNTER — PATIENT MESSAGE (OUTPATIENT)
Dept: DERMATOLOGY | Facility: CLINIC | Age: 79
End: 2024-01-18
Payer: MEDICARE

## 2024-02-06 ENCOUNTER — OFFICE VISIT (OUTPATIENT)
Dept: ORTHOPEDICS | Facility: CLINIC | Age: 79
End: 2024-02-06
Payer: MEDICARE

## 2024-02-06 ENCOUNTER — HOSPITAL ENCOUNTER (OUTPATIENT)
Dept: RADIOLOGY | Facility: HOSPITAL | Age: 79
Discharge: HOME | End: 2024-02-06
Attending: PHYSICIAN ASSISTANT
Payer: MEDICARE

## 2024-02-06 DIAGNOSIS — M67.449 DIGITAL MUCOUS CYST: ICD-10-CM

## 2024-02-06 DIAGNOSIS — M67.441 DIGITAL MUCINOUS CYST OF FINGER OF RIGHT HAND: Primary | ICD-10-CM

## 2024-02-06 PROCEDURE — 99204 OFFICE O/P NEW MOD 45 MIN: CPT | Mod: S$PBB,,, | Performed by: PHYSICIAN ASSISTANT

## 2024-02-06 PROCEDURE — 73140 X-RAY EXAM OF FINGER(S): CPT | Mod: TC,RT

## 2024-02-06 PROCEDURE — 73140 X-RAY EXAM OF FINGER(S): CPT | Mod: 26,RT,, | Performed by: RADIOLOGY

## 2024-02-06 PROCEDURE — 99999 PR PBB SHADOW E&M-EST. PATIENT-LVL III: CPT | Mod: PBBFAC,,, | Performed by: PHYSICIAN ASSISTANT

## 2024-02-06 PROCEDURE — 99213 OFFICE O/P EST LOW 20 MIN: CPT | Mod: PBBFAC | Performed by: PHYSICIAN ASSISTANT

## 2024-02-06 NOTE — PROGRESS NOTES
[facility]  History & Physical  Orthopedics    SUBJECTIVE:      Chief Complaint: Right long finger cyst    Referring Provider: Caty Corbin MD Dr. Dunbar is the supervising physician for this encounter/patient    History of Present Illness:  Patient is a 78 y.o. right hand dominant male who presents today with complaints of right long finger cyst present for over 1 year. No trauma/injury. Present just proximal to the nail fold. No infection. He has tried topical treatments which help varyingly. He does get pain when he hits the cyst. He would like to have this surgically removed.     The patient is a/an [employer].    Onset of symptoms/DOI was 1+ year.    Symptoms are aggravated by activity.    Symptoms are alleviated by rest.    Symptoms consist of pain.    The patient rates their pain as a 1/10.    Attempted treatment(s) and/or interventions include activity modifications, rest,  topical anti-inflammtories .     The patient denies any fevers, chills, N/V, D/C and presents for evaluation.       Past Medical History:   Diagnosis Date    Anticoagulant long-term use     Arthritis     Atrial fibrillation     Basal cell carcinoma     right upper back, left upper arm     BCC (basal cell carcinoma) 12/2020    right temporal scalp    BCC (basal cell carcinoma) 07/2021    left upper back     Colon polyps     tubular adenoma, repeat in 2016    Diabetes mellitus     Guillain-[place]     patient denies history of this condition    Hyperlipidemia 8/25/2017    Hypertension     Prostate CA     Prostate cancer     Sleep apnea     Thyroid disease      Past Surgical History:   Procedure Laterality Date    BASAL CELL CARCINOMA EXCISION      CARDIOVERSION N/A 7/25/2018    Procedure: CARDIOVERSION;  Surgeon: Harvey Blake MD;  Location: [facility] CATH LAB;  Service: Cardiology;  Laterality: N/A;  AF, NEVAEH/DCCV, MAC, SK, 3 Prep    COLONOSCOPY N/A 4/10/2017    Procedure: COLONOSCOPY;  Surgeon: Rubin Solano MD;   Location: [facility] ENDO (4TH FLR);  Service: Endoscopy;  Laterality: N/A;    COLONOSCOPY N/A 11/17/2022    Procedure: COLONOSCOPY;  Surgeon: Darell Jeter MD;  Location: [facility] ENDO (2ND FLR);  Service: Endoscopy;  Laterality: N/A;  Extensive cardiac hx with h/o sina, instr. via portal - PC    PROSTATECTOMY      TONSILLECTOMY      TREATMENT OF CARDIAC ARRHYTHMIA N/A 2/6/2019    Procedure: CARDIOVERSION;  Surgeon: Harvey Blake MD;  Location: [facility] EP LAB;  Service: Cardiology;  Laterality: N/A;  AF, DCCV, MAC, SK, 3 PREP     Review of patient's allergies indicates:  No Known Allergies  Social History     Social History Narrative    Not on file     Family History   Problem Relation Age of Onset    Diabetes Mother     Heart disease Father     Asthma Sister     Diabetes Sister     Skin cancer Neg Hx     Melanoma Neg Hx          Current Outpatient Medications:     atorvastatin (LIPITOR) 20 MG tablet, Take 1 tablet (20 mg total) by mouth once daily., Disp: 90 tablet, Rfl: 3    ELIQUIS 5 mg Tab, TAKE 1 TABLET TWICE A DAY, Disp: 180 tablet, Rfl: 3    ergocalciferol (VITAMIN D2) 50,000 unit Cap, TAKE 1 CAPSULE TWICE A WEEK, Disp: 24 capsule, Rfl: 3    fluorouraciL (EFUDEX) 5 % cream, Mix with calcipotriene 0.005% cream  and Apply thin film to right forehead and temple and cheek  2 times per day for 7 days; d/c if area bleeding or ulcerated; avoid eyes or mouth, Disp: 40 g, Rfl: 1    FREESTYLE AURE 14 DAY READER [facility] [place] BLOOD SUGAR BID., Disp: 6 each, Rfl: 6    FREESTYLE AURE 14 DAY SENSOR Kit, USE TO TEST TWICE DAILY REMOVE AND REPLACE EEVRY 2 WEEKS, Disp: 2 kit, Rfl: 3    FREESTYLE AURE 14 DAY SENSOR Kit, USE TO TEST BLOOD SUGAR TWICE DAILY AND REPLACE EVERY 2 WEEKS, Disp: 2 kit, Rfl: 6    levothyroxine (SYNTHROID) 88 MCG tablet, Take 1 tablet (88 mcg total) by mouth before breakfast., Disp: 90 tablet, Rfl: 3    lisinopriL (PRINIVIL,ZESTRIL) 40 MG tablet, TAKE 1 TABLET DAILY, Disp: 90 tablet, Rfl: 3    metFORMIN  (GLUCOPHAGE-XR) 750 MG ER 24hr tablet, Take 1 tablet (750 mg total) by mouth 2 (two) times daily with meals., Disp: 180 tablet, Rfl: 3    metoprolol succinate (TOPROL-XL) 25 MG 24 hr tablet, TAKE 1 TABLET DAILY, Disp: 90 tablet, Rfl: 3    tirzepatide (MOUNJARO) 5 mg/0.5 mL PnIj, INJECT 5 MG UNDER THE SKIN EVERY 7 DAYS, Disp: 12 pen , Rfl: 3    vitamin E 800 UNIT capsule, Take 800 Units by mouth once daily., Disp: , Rfl:   No current facility-administered medications for this visit.    Facility-Administered Medications Ordered in Other Visits:     sodium chloride 0.9% flush 5 mL, 5 mL, Intravenous, PRN, Cathy Hickman NP      Review of Systems:  Constitutional: no fever or chills  Eyes: no visual changes  ENT: no nasal congestion or sore throat  Respiratory: no cough or shortness of breath  Cardiovascular: no chest pain  Gastrointestinal: no nausea or vomiting, tolerating diet  Musculoskeletal: pain and soreness    OBJECTIVE:      Vital Signs (Most Recent):  There were no vitals filed for this visit.  There is no height or weight on file to calculate BMI.      Physical Exam:  Constitutional: The patient appears well-developed and well-nourished. No distress.   Skin: No lesions appreciated  Head: Normocephalic and atraumatic.   Nose: Nose normal.   Ears: No deformities seen  Eyes: Conjunctivae and EOM are normal.   Neck: No tracheal deviation present.   Cardiovascular: Normal rate and intact distal pulses.    Pulmonary/Chest: Effort normal. No respiratory distress.   Abdominal: There is no guarding.   Neurological: The patient is alert.   Psychiatric: The patient has a normal mood and affect.     Right Hand/Wrist Examination:    Observation/Inspection:  Swelling  none    Deformity  none  Discoloration  none     Scars   none    Atrophy  none  Small digital mucous cyst noted to the dorsal distal phalanx just proximal to the nail fold of the long finger. No signs of infection. Flattening of the nail plate  noted    HAND/WRIST EXAMINATION:  Finkelstein's Test   Neg  WHAT Test    Neg  Snuff box tenderness   Neg  Mata's Test    Neg  Hook of Hamate Tenderness  Neg  CMC grind    Neg  Circumduction test   Neg    Neurovascular Exam:  Digits WWP, brisk CR < 3s throughout  NVI motor/LTS to M/R/U nerves, radial pulse 2+  Tinel's Test - Carpal Tunnel  Neg  Tinel's Test - Cubital Tunnel  Neg  Phalen's Test    Neg  Median Nerve Compression Test Neg    ROM hand full, painless    ROM wrist full, painless    ROM elbow full, painless    Abdomen not guarded  Respirations nonlabored  Perfusion intact    Diagnostic Results:     Imaging - I independently viewed the patient's imaging as well as the radiology report.  Xrays of the patient's right long finger  demonstrate no evidence of any acute fractures or dislocations, positive for degenerative changes.    EMG - none    ASSESSMENT/PLAN:      78 y.o. yo male with Right long finger digital mucous cyst    Plan: The patient and I had a thorough discussion today.  We discussed the working diagnosis as well as several other potential alternative diagnoses.  Treatment options were discussed, both conservative and surgical.  Conservative treatment options would include things such as activity modifications, workplace modifications, a period of rest, oral vs topical OTC and prescription anti-inflammatory medications, occupational therapy, splinting/bracing, immobilization, corticosteroid injections, and others.  Surgical options were discussed as well.     At this time, the patient would like to proceed with surgery. He is consented for Right long finger cyst excision, he will message us back with the requested date and I will order/post the case. I have messaged preop clearance clinic, he does have Afib and is on Eliquis.    The patient has not responded to adequate non operative treatment at this time and/or non operative treatment is not indicated. Thus, the risks, benefits and alternatives  to surgery were discussed with the patient in detail.  Specific risks include but are not limited to bleeding, infection, vessel and/or nerve damage, pain, numbness, tingling, complex regional pain syndrome, compartment syndrome, failure to return to pre-injury and/or preoperative functional status, scar sensitivity, delayed healing, inability to return to work, pulley injury, tendon injury, bowstringing, partial and/or incomplete relief of symptoms, weakness, persistence of and/or worsening of symptoms, surgical failure, osteomyelitis, amputation, loss of function, stiffness, functional debility, dysfunction, decreased  strength, need for prolonged postoperative rehabilitation, need for further surgery, deep venous thrombosis, pulmonary embolism, arthritis and death.  The patient states an understanding and wishes to proceed with surgery.   All questions were answered.  No guarantees were implied or stated.  Written informed consent was obtained.    Should the patient's symptoms worsen, persist, or fail to improve they should return for reevaluation and I would be happy to see them back anytime.           Please do not hesitate to reach out to us via email, phone, or MyChart with any questions, concerns, or feedback.

## 2024-02-09 ENCOUNTER — LAB VISIT (OUTPATIENT)
Dept: LAB | Facility: OTHER | Age: 79
End: 2024-02-09
Attending: INTERNAL MEDICINE
Payer: MEDICARE

## 2024-02-09 DIAGNOSIS — I10 ESSENTIAL HYPERTENSION: ICD-10-CM

## 2024-02-09 DIAGNOSIS — Z79.01 ANTICOAGULANT LONG-TERM USE: ICD-10-CM

## 2024-02-09 DIAGNOSIS — G47.33 OBSTRUCTIVE SLEEP APNEA: ICD-10-CM

## 2024-02-09 DIAGNOSIS — I48.19 PERSISTENT ATRIAL FIBRILLATION: ICD-10-CM

## 2024-02-09 DIAGNOSIS — Z90.79 HISTORY OF PROSTATECTOMY: ICD-10-CM

## 2024-02-09 DIAGNOSIS — E80.4 GILBERT'S SYNDROME: ICD-10-CM

## 2024-02-09 DIAGNOSIS — R49.0 HOARSENESS: ICD-10-CM

## 2024-02-09 DIAGNOSIS — C44.91 BASAL CELL CARCINOMA (BCC), UNSPECIFIED SITE: ICD-10-CM

## 2024-02-09 DIAGNOSIS — E11.65 TYPE 2 DIABETES MELLITUS WITH HYPERGLYCEMIA, WITHOUT LONG-TERM CURRENT USE OF INSULIN: ICD-10-CM

## 2024-02-09 DIAGNOSIS — E03.4 HYPOTHYROIDISM DUE TO ACQUIRED ATROPHY OF THYROID: ICD-10-CM

## 2024-02-09 DIAGNOSIS — E13.9 DIABETES MELLITUS DUE TO ABNORMAL INSULIN: ICD-10-CM

## 2024-02-09 DIAGNOSIS — K76.0 NONALCOHOLIC FATTY LIVER DISEASE: ICD-10-CM

## 2024-02-09 LAB
ALBUMIN SERPL BCP-MCNC: 4.2 G/DL (ref 3.5–5.2)
ALP SERPL-CCNC: 88 U/L (ref 55–135)
ALT SERPL W/O P-5'-P-CCNC: 11 U/L (ref 10–44)
ANION GAP SERPL CALC-SCNC: 12 MMOL/L (ref 8–16)
AST SERPL-CCNC: 17 U/L (ref 10–40)
BASOPHILS # BLD AUTO: 0.04 K/UL (ref 0–0.2)
BASOPHILS NFR BLD: 0.5 % (ref 0–1.9)
BILIRUB SERPL-MCNC: 2.7 MG/DL (ref 0.1–1)
BUN SERPL-MCNC: 17 MG/DL (ref 8–23)
CALCIUM SERPL-MCNC: 9.9 MG/DL (ref 8.7–10.5)
CHLORIDE SERPL-SCNC: 103 MMOL/L (ref 95–110)
CHOLEST SERPL-MCNC: 103 MG/DL (ref 120–199)
CHOLEST/HDLC SERPL: 2.2 {RATIO} (ref 2–5)
CO2 SERPL-SCNC: 23 MMOL/L (ref 23–29)
CREAT SERPL-MCNC: 0.9 MG/DL (ref 0.5–1.4)
DIFFERENTIAL METHOD BLD: ABNORMAL
EOSINOPHIL # BLD AUTO: 0.1 K/UL (ref 0–0.5)
EOSINOPHIL NFR BLD: 1.6 % (ref 0–8)
ERYTHROCYTE [DISTWIDTH] IN BLOOD BY AUTOMATED COUNT: 12.4 % (ref 11.5–14.5)
EST. GFR  (NO RACE VARIABLE): >60 ML/MIN/1.73 M^2
ESTIMATED AVG GLUCOSE: 148 MG/DL (ref 68–131)
GLUCOSE SERPL-MCNC: 104 MG/DL (ref 70–110)
HBA1C MFR BLD: 6.8 % (ref 4–5.6)
HCT VFR BLD AUTO: 39.2 % (ref 40–54)
HDLC SERPL-MCNC: 46 MG/DL (ref 40–75)
HDLC SERPL: 44.7 % (ref 20–50)
HGB BLD-MCNC: 13.4 G/DL (ref 14–18)
IMM GRANULOCYTES # BLD AUTO: 0.03 K/UL (ref 0–0.04)
IMM GRANULOCYTES NFR BLD AUTO: 0.4 % (ref 0–0.5)
LDLC SERPL CALC-MCNC: 45 MG/DL (ref 63–159)
LYMPHOCYTES # BLD AUTO: 2.3 K/UL (ref 1–4.8)
LYMPHOCYTES NFR BLD: 30.8 % (ref 18–48)
MCH RBC QN AUTO: 30.2 PG (ref 27–31)
MCHC RBC AUTO-ENTMCNC: 34.2 G/DL (ref 32–36)
MCV RBC AUTO: 89 FL (ref 82–98)
MONOCYTES # BLD AUTO: 0.6 K/UL (ref 0.3–1)
MONOCYTES NFR BLD: 8.6 % (ref 4–15)
NEUTROPHILS # BLD AUTO: 4.3 K/UL (ref 1.8–7.7)
NEUTROPHILS NFR BLD: 58.1 % (ref 38–73)
NONHDLC SERPL-MCNC: 57 MG/DL
NRBC BLD-RTO: 0 /100 WBC
PLATELET # BLD AUTO: 181 K/UL (ref 150–450)
PMV BLD AUTO: 9.8 FL (ref 9.2–12.9)
POTASSIUM SERPL-SCNC: 4.4 MMOL/L (ref 3.5–5.1)
PROT SERPL-MCNC: 7.7 G/DL (ref 6–8.4)
RBC # BLD AUTO: 4.43 M/UL (ref 4.6–6.2)
SODIUM SERPL-SCNC: 138 MMOL/L (ref 136–145)
TRIGL SERPL-MCNC: 60 MG/DL (ref 30–150)
TSH SERPL DL<=0.005 MIU/L-ACNC: 1.01 UIU/ML (ref 0.4–4)
WBC # BLD AUTO: 7.34 K/UL (ref 3.9–12.7)

## 2024-02-09 PROCEDURE — 85025 COMPLETE CBC W/AUTO DIFF WBC: CPT | Performed by: INTERNAL MEDICINE

## 2024-02-09 PROCEDURE — 80053 COMPREHEN METABOLIC PANEL: CPT | Performed by: INTERNAL MEDICINE

## 2024-02-09 PROCEDURE — 84443 ASSAY THYROID STIM HORMONE: CPT | Performed by: INTERNAL MEDICINE

## 2024-02-09 PROCEDURE — 36415 COLL VENOUS BLD VENIPUNCTURE: CPT | Performed by: INTERNAL MEDICINE

## 2024-02-09 PROCEDURE — 80061 LIPID PANEL: CPT | Performed by: INTERNAL MEDICINE

## 2024-02-09 PROCEDURE — 83036 HEMOGLOBIN GLYCOSYLATED A1C: CPT | Performed by: INTERNAL MEDICINE

## 2024-02-12 ENCOUNTER — PATIENT MESSAGE (OUTPATIENT)
Dept: ORTHOPEDICS | Facility: CLINIC | Age: 79
End: 2024-02-12
Payer: MEDICARE

## 2024-02-12 DIAGNOSIS — M67.441 DIGITAL MUCINOUS CYST OF FINGER OF RIGHT HAND: Primary | ICD-10-CM

## 2024-02-19 DIAGNOSIS — M67.441 DIGITAL MUCINOUS CYST OF FINGER OF RIGHT HAND: Primary | ICD-10-CM

## 2024-02-19 RX ORDER — CEFAZOLIN SODIUM 2 G/50ML
2 SOLUTION INTRAVENOUS
Status: CANCELLED | OUTPATIENT
Start: 2024-02-19

## 2024-02-19 RX ORDER — MUPIROCIN 20 MG/G
OINTMENT TOPICAL
Status: CANCELLED | OUTPATIENT
Start: 2024-02-19

## 2024-03-07 ENCOUNTER — TELEPHONE (OUTPATIENT)
Dept: PREADMISSION TESTING | Facility: HOSPITAL | Age: 79
End: 2024-03-07
Payer: MEDICARE

## 2024-03-07 NOTE — TELEPHONE ENCOUNTER
----- Message from Lisa De Leon sent at 2/12/2024 10:29 AM CST -----  Regarding: surgery clearance  Hello!    The patient is scheduled for mucus cyst excision surgery on 4/5/2024 with Dr. Peña.     Medical clearance is indicated prior to this. Would you be able to help the patient in that regard? The anesthesia team will require documentation in the chart regarding clearance status and perioperative medical recommendations.    Thank you for your help & let me know if I can assist in any way!    Lisa De Leon MS, OTC  Sports Medicine Assistant to Dr. David Peña

## 2024-03-22 ENCOUNTER — TELEPHONE (OUTPATIENT)
Dept: INTERNAL MEDICINE | Facility: CLINIC | Age: 79
End: 2024-03-22
Payer: MEDICARE

## 2024-03-22 PROBLEM — M67.441 DIGITAL MUCINOUS CYST OF FINGER OF RIGHT HAND: Status: ACTIVE | Noted: 2024-03-22

## 2024-03-22 NOTE — ASSESSMENT & PLAN NOTE
Current BP  not at goal today; 168/71.    Taking: Lisinopril- did not take this AM  Clinic readings since march 2023: Systolic 120-164/Diastolic: 64-84    Lifestyle changes to reduce systolic BP:   exercise 30 minutes per day,  5 days per week or 150 minutes weekly; sodium reduction and avoidance of high salt foods such as processed meats, frozen meals and  fast foods.   Keeping a healthy weight/BMI can help with better BP control    BP acceptable for surgery. I recommend monitoring BP during perioperative period as uncontrolled pain can elevate blood pressure.

## 2024-03-22 NOTE — ASSESSMENT & PLAN NOTE
Treated with Eliquis; stable  S/P cardioversion x 2 in 2018 and 2019  Denies bleeding issues.   Followed by: cardiology- will request instructions

## 2024-03-22 NOTE — ASSESSMENT & PLAN NOTE
Recovered per EP note  Followed per Cardiology    Denies CP/SOB/fatigue/PND/orthopnea/syncope/edema      Echo: 3/21/22  Bradycardia was present during the study. HR mid 40s  The estimated ejection fraction is 55%.  The left ventricle is normal in size with concentric remodeling and normal systolic function.  Normal left ventricular diastolic function.  Normal right ventricular size with normal right ventricular systolic function.  Severe left atrial enlargement.  Moderate right atrial enlargement.  Mild tricuspid regurgitation.  The estimated PA systolic pressure is 28 mmHg.  Intermediate central venous pressure (8 mmHg).

## 2024-03-24 PROBLEM — D64.9 NORMOCYTIC ANEMIA: Status: ACTIVE | Noted: 2024-03-24

## 2024-03-24 NOTE — ASSESSMENT & PLAN NOTE
S/P robotic prostatectomy in 2009 for prostate cancer.  Last seen by Urology in 2018  Denies LUTS  PSA: normal 2/9/23

## 2024-03-24 NOTE — OUTPATIENT SUBJECTIVE & OBJECTIVE
Outpatient Subjective & Objective      Chief Complaint: Preoperative evaulation, perioperative medical management, and complication reduction plan.     Functional Capacity: [place] without CP/SOB.       Anesthesia issues: None    Difficulty mouth opening: No    Steroid use in the last 12 months:  No    Dental Issues: None    Family anesthesia difficulty: None       Family Hx of Thrombosis: None    Past Medical History:   Diagnosis Date    Anticoagulant long-term use     Arthritis     Atrial fibrillation     Basal cell carcinoma     right upper back, left upper arm     BCC (basal cell carcinoma) 12/2020    right temporal scalp    BCC (basal cell carcinoma) 07/2021    left upper back     Colon polyps     tubular adenoma, repeat in 2016    Diabetes mellitus     [place]ain[place]     patient denies history of this condition    Hyperlipidemia 8/25/2017    Hypertension     Prostate CA     Prostate cancer     Sleep apnea     Thyroid disease          Past Medical History Pertinent Negatives:   Diagnosis Date Noted    Asthma 07/25/2018    CHF (congestive heart failure) 07/25/2018    COPD (chronic obstructive pulmonary disease) 07/25/2018    Coronary artery disease 07/25/2018    Encounter for blood transfusion 07/25/2018    Seizures 07/25/2018    Stroke 07/25/2018         Past Surgical History:   Procedure Laterality Date    BASAL CELL CARCINOMA EXCISION      CARDIOVERSION N/A 7/25/2018    Procedure: CARDIOVERSION;  Surgeon: Harvey Blake MD;  Location: [facility] CATH LAB;  Service: Cardiology;  Laterality: N/A;  AF, NEVAEH/DCCV, MAC, SK, 3 Prep    COLONOSCOPY N/A 4/10/2017    Procedure: COLONOSCOPY;  Surgeon: Rubin Solano MD;  Location: [facility] ENDO (4TH FLR);  Service: Endoscopy;  Laterality: N/A;    COLONOSCOPY N/A 11/17/2022    Procedure: COLONOSCOPY;  Surgeon: Darell Jeter MD;  Location: [facility] ENDO (2ND FLR);  Service: Endoscopy;  Laterality: N/A;  Extensive cardiac hx with h/o sina instr. via portal - PC     "PROSTATECTOMY      TONSILLECTOMY      TREATMENT OF CARDIAC ARRHYTHMIA N/A 2/6/2019    Procedure: CARDIOVERSION;  Surgeon: Harvey Blake MD;  Location: [facility] LAB;  Service: Cardiology;  Laterality: N/A;  AF, DCCV, MAC, SK, 3 PREP       Review of Systems   Constitutional:  Negative for chills, fatigue, fever and unexpected weight change.   HENT:  Positive for hearing loss. Negative for congestion, rhinorrhea, sore throat, tinnitus and trouble swallowing.    Eyes:  Negative for visual disturbance.   Respiratory:  Negative for cough, chest tightness, shortness of breath and wheezing.    Cardiovascular:  Negative for chest pain, palpitations and leg swelling.   Gastrointestinal:  Negative for constipation and diarrhea.   Genitourinary:  Negative for decreased urine volume, difficulty urinating, dysuria, frequency, hematuria and urgency.   Musculoskeletal:  Negative for arthralgias, back pain, neck pain and neck stiffness.   Skin:  Negative for rash and wound.   Neurological:  Positive for numbness (BLE). Negative for dizziness, syncope, weakness and headaches.   Hematological:  Bruises/bleeds easily.   Psychiatric/Behavioral:  Negative for sleep disturbance and suicidal ideas.               VITALS  Visit Vitals  BP (!) 168/71 (BP Location: Left arm, Patient Position: Sitting)   Pulse (!) 49   Temp 98.1 °F (36.7 °C) (Oral)   Ht 5' 7" (1.702 m)   Wt 75 kg (165 lb 5.5 oz)   SpO2 98%   BMI 25.90 kg/m²          Physical Exam  Vitals reviewed.   Constitutional:       General: He is not in acute distress.     Appearance: He is well-developed.   HENT:      Head: Normocephalic.      Nose: Nose normal.      Mouth/Throat:      Pharynx: No oropharyngeal exudate.   Eyes:      General:         Right eye: No discharge.         Left eye: No discharge.      Conjunctiva/sclera: Conjunctivae normal.      Pupils: Pupils are equal, round, and reactive to light.   Neck:      Thyroid: No thyromegaly.      Vascular: No carotid bruit or JVD. "      Trachea: No tracheal deviation.   Cardiovascular:      Rate and Rhythm: Normal rate. Rhythm irregularly irregular.      Pulses:           Carotid pulses are 2+ on the right side and 2+ on the left side.       Dorsalis pedis pulses are 2+ on the right side and 1+ on the left side.        Posterior tibial pulses are 2+ on the right side and 1+ on the left side.      Heart sounds: Normal heart sounds. No murmur heard.  Pulmonary:      Effort: Pulmonary effort is normal. No respiratory distress.      Breath sounds: Normal breath sounds. No stridor. No wheezing, rhonchi or rales.   Abdominal:      General: Bowel sounds are normal. There is no distension.      Palpations: Abdomen is soft.      Tenderness: There is no abdominal tenderness. There is no guarding.   Musculoskeletal:      Cervical back: Normal range of motion.      Right lower leg: No edema.      Left lower leg: No edema.   Lymphadenopathy:      Cervical: No cervical adenopathy.   Skin:     General: Skin is warm and dry.      Capillary Refill: Capillary refill takes less than 2 seconds.      Findings: No erythema or rash.   Neurological:      Mental Status: He is alert and oriented to person, place, and time.   Psychiatric:         Behavior: Behavior normal.          Significant Labs:  Lab Results   Component Value Date    WBC 7.34 02/09/2024    HGB 13.4 (L) 02/09/2024    HCT 39.2 (L) 02/09/2024     02/09/2024    CHOL 103 (L) 02/09/2024    TRIG 60 02/09/2024    HDL 46 02/09/2024    ALT 11 02/09/2024    AST 17 02/09/2024     02/09/2024    K 4.4 02/09/2024     02/09/2024    CREATININE 0.9 02/09/2024    BUN 17 02/09/2024    CO2 23 02/09/2024    TSH 1.010 02/09/2024    PSA <0.01 02/09/2023    INR 1.0 02/01/2019    HGBA1C 6.8 (H) 02/09/2024           EKG:   Results for orders placed or performed in visit on 10/23/23   EKG 12-lead    Collection Time: 10/23/23  9:16 AM    Narrative    Test Reason : I48.19,    Vent. Rate : 042 BPM     Atrial  Rate : 312 BPM     P-R Int : 000 ms          QRS Dur : 160 ms      QT Int : 520 ms       P-R-T Axes : 000 -77 -14 degrees     QTc Int : 434 ms    Atrial fibrillation with slow ventricular response (mostly regular RR  interval, cannot rule out high grade AV block)  Left axis deviation  Right bundle branch block  Abnormal ECG  When compared with ECG of 07-MAR-2022 08:27,  No significant change was found  Confirmed by Gavin Fox MD ([id number]) on 10/25/2023 7:53:47 AM    Referred By: [email or web address]   SELF           Confirmed By:Gavin Fox MD       2D ECHO:  TTE:  Results for orders placed or performed during the hospital encounter of 03/21/22   Echo   Result Value Ref Range    Ascending aorta 3.28 cm    STJ 2.64 cm    AV mean gradient 5 mmHg    Ao peak mercedes 1.44 m/s    Ao VTI 33.72 cm    IVS 0.92 0.6 - 1.1 cm    LA size 4.99 cm    Left Atrium Major Axis 6.95 cm    Left Atrium Minor Axis 6.49 cm    LVIDd 5.06 3.5 - 6.0 cm    LVIDs 3.23 2.1 - 4.0 cm    LVOT diameter 2.14 cm    LVOT peak VTI 15.99 cm    Posterior Wall 1.12 (A) 0.6 - 1.1 cm    MV Peak E Mercedes 0.97 m/s    RA Major Axis 6.37 cm    RA Width 5.48 cm    RVDD 5.21 cm    Sinus 2.82 cm    TAPSE 1.95 cm    TR Max Mercedes 2.26 m/s    TDI LATERAL 0.11 m/s    TDI SEPTAL 0.07 m/s    LA WIDTH 4.81 cm    LV Diastolic Volume 121.71 mL    LV Systolic Volume 41.77 mL    RV S' 9.53 cm/s    LVOT peak mercedes 0.73 m/s    LA volume (mod) 93.68 cm3    LV LATERAL E/E' RATIO 8.82 m/s    LV SEPTAL E/E' RATIO 13.86 m/s    FS 36 %    LA volume 136.94 cm3    LV mass 190.59 g    Left Ventricle Relative Wall Thickness 0.44 cm    AV valve area 1.70 cm2    AV Velocity Ratio 0.51     AV index (prosthetic) 0.47     Mean e' 0.09 m/s    LVOT area 3.6 cm2    LVOT stroke volume 57.48 cm3    AV peak gradient 8 mmHg    E/E' ratio 10.78 m/s    Triscuspid Valve Regurgitation Peak Gradient 20 mmHg    BSA 2.04 m2    LV Systolic Volume Index 20.8 mL/m2    LV Diastolic Volume Index 60.55 mL/m2    LA Volume Index  68.1 mL/m2    LV Mass Index 95 g/m2    LA Volume Index (Mod) 46.6 mL/m2    Right Atrial Pressure (from IVC) 8 mmHg    EF 55 %    RA area 32.00 cm2    TV resting pulmonary artery pressure 28 mmHg    Narrative    · Bradycardia was present during the study. HR mid 40s  · The estimated ejection fraction is 55%.  · The left ventricle is normal in size with concentric remodeling and   normal systolic function.  · Normal left ventricular diastolic function.  · Normal right ventricular size with normal right ventricular systolic   function.  · Severe left atrial enlargement.  · Moderate right atrial enlargement.  · Mild tricuspid regurgitation.  · The estimated PA systolic pressure is 28 mmHg.  · Intermediate central venous pressure (8 mmHg).          NEVAEH: 2018   CONCLUSIONS     1 - Biatrial enlargement.     2 - No LA or EDGAR thrombus.     3 - Mildly depressed left ventricular systolic function (EF 45-50%).     4 - Enlarged RV with normal RV systolic function.     5 - Moderate tricuspid regurgitation.     6 - Mild mitral regurgitation.           Active Cardiac Conditions: None      Revised Cardiac Risk Index   High -Risk Surgery  Intraperitoneal; Intrathoracic; suprainguinal vascular Yes- + 1 No- 0   History of Ischemic Heart Disease   (Hx of MI/positive exercise test/current chest pain due to ischemia/use of nitrate therapy/EKG with pathological Q waves) Yes- + 1 No- 0   History of CHF  (Pulmonary edema/bilateral rales or S3 gallop/PND/CXR showing pulmonary vascular redistribution) Yes- + 1 No- 0   History of CVA   (Prior stroke or TIA) Yes- + 1 No- 0   Pre-operative treatment with insulin Yes- + 1 No- 0   Pre-operative creatinine > 2mg/dl Yes- + 1 No- 0   Total: 0      Risk Status:  Estimated risk of cardiac complications after non-cardiac surgery using the Revised Cardiac Risk Index for Preoperative risk is 3.9 %      ARISCAT ([place]) risk index: Low: 1.6% risk of post-op pulmonary complications.    American Society of  Anesthesiologists Physical Status classification (ASA): 3             Outpatient Subjective & Objective

## 2024-03-24 NOTE — ASSESSMENT & PLAN NOTE
Currently takes: metformin/Mounjaro   Most recent A1c is 6.8.      Reports peripheral neuropathy.   Denies visual changes. Up to date with eye exams.  Micro changes: Denies- Retinopathy, Nephropathy   Macro changes: Denies-  Carotid, Coronary , Peripheral disease

## 2024-03-24 NOTE — HPI
This is a 78 y.o. male  who presents today for a preoperative evaluation in preparation for excision of mucus cyst to right longer finger.   Patient is new to me.  The history has been obtained by speaking with the patient and reviewing the electronic medical record and/or outside health information. Significant health conditions for the perioperative period are discussed below in assessment and plan.   Patient reports current health status to be Good.  Denies any new symptoms before surgery.

## 2024-03-24 NOTE — ASSESSMENT & PLAN NOTE
CPAP compliance: Yes.       Recommend caution with sedating medication that can cause respiratory depression.

## 2024-03-24 NOTE — ASSESSMENT & PLAN NOTE
Currently treated with Levothyroxine 88 mcg  TSH: normal 2/9/24  Denies Hx of nodules.   Followed by PCP.

## 2024-03-24 NOTE — ASSESSMENT & PLAN NOTE
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

## 2024-03-25 ENCOUNTER — ANESTHESIA EVENT (OUTPATIENT)
Dept: SURGERY | Facility: HOSPITAL | Age: 79
End: 2024-03-25
Payer: MEDICARE

## 2024-03-25 ENCOUNTER — OFFICE VISIT (OUTPATIENT)
Dept: INTERNAL MEDICINE | Facility: CLINIC | Age: 79
End: 2024-03-25
Payer: MEDICARE

## 2024-03-25 VITALS
WEIGHT: 165.38 LBS | BODY MASS INDEX: 25.96 KG/M2 | HEIGHT: 67 IN | DIASTOLIC BLOOD PRESSURE: 71 MMHG | TEMPERATURE: 98 F | OXYGEN SATURATION: 98 % | HEART RATE: 49 BPM | SYSTOLIC BLOOD PRESSURE: 168 MMHG

## 2024-03-25 DIAGNOSIS — G47.33 OBSTRUCTIVE SLEEP APNEA: ICD-10-CM

## 2024-03-25 DIAGNOSIS — E11.65 TYPE 2 DIABETES MELLITUS WITH HYPERGLYCEMIA, WITHOUT LONG-TERM CURRENT USE OF INSULIN: ICD-10-CM

## 2024-03-25 DIAGNOSIS — M67.441 DIGITAL MUCINOUS CYST OF FINGER OF RIGHT HAND: ICD-10-CM

## 2024-03-25 DIAGNOSIS — I48.19 PERSISTENT ATRIAL FIBRILLATION: ICD-10-CM

## 2024-03-25 DIAGNOSIS — K21.9 GASTROESOPHAGEAL REFLUX DISEASE, UNSPECIFIED WHETHER ESOPHAGITIS PRESENT: ICD-10-CM

## 2024-03-25 DIAGNOSIS — I10 ESSENTIAL HYPERTENSION: ICD-10-CM

## 2024-03-25 DIAGNOSIS — Z79.01 ANTICOAGULANT LONG-TERM USE: ICD-10-CM

## 2024-03-25 DIAGNOSIS — I42.8 CARDIOMYOPATHY, NONISCHEMIC: ICD-10-CM

## 2024-03-25 DIAGNOSIS — K63.5 POLYP OF COLON, UNSPECIFIED PART OF COLON, UNSPECIFIED TYPE: ICD-10-CM

## 2024-03-25 DIAGNOSIS — D64.9 NORMOCYTIC ANEMIA: ICD-10-CM

## 2024-03-25 DIAGNOSIS — K76.0 NONALCOHOLIC FATTY LIVER DISEASE: ICD-10-CM

## 2024-03-25 DIAGNOSIS — Z01.818 PREOPERATIVE EXAMINATION: Primary | ICD-10-CM

## 2024-03-25 DIAGNOSIS — Z90.79 HISTORY OF PROSTATECTOMY: ICD-10-CM

## 2024-03-25 DIAGNOSIS — E03.4 HYPOTHYROIDISM DUE TO ACQUIRED ATROPHY OF THYROID: ICD-10-CM

## 2024-03-25 DIAGNOSIS — E80.4 GILBERT'S SYNDROME: ICD-10-CM

## 2024-03-25 PROCEDURE — 99214 OFFICE O/P EST MOD 30 MIN: CPT | Mod: PBBFAC | Performed by: NURSE PRACTITIONER

## 2024-03-25 PROCEDURE — 99999 PR PBB SHADOW E&M-EST. PATIENT-LVL IV: CPT | Mod: PBBFAC,,, | Performed by: NURSE PRACTITIONER

## 2024-03-25 PROCEDURE — 99215 OFFICE O/P EST HI 40 MIN: CPT | Mod: S$PBB,,, | Performed by: NURSE PRACTITIONER

## 2024-03-25 NOTE — PROGRESS NOTES
[facility]  Progress Note    Patient Name: Erasmo Cherry  MRN: [id number]  Date of Evaluation- 03/25/2024  PCP- Chichi Stack MD    Future cases for Johnnie Cherry [[id number]]       Case ID Status Date Time Vin Procedure Provider Location    [id number] [facility] 4/5/2024  7:00 AM 62 EXCISION, CYST - mucus cyst right long finger David Peña MD [[id number]] [facility]            HPI:  This is a 78 y.o. male  who presents today for a preoperative evaluation in preparation for excision of mucus cyst to right longer finger.   Patient is new to me.  The history has been obtained by speaking with the patient and reviewing the electronic medical record and/or outside health information. Significant health conditions for the perioperative period are discussed below in assessment and plan.   Patient reports current health status to be Good.  Denies any new symptoms before surgery.       Subjective/ Objective:     Chief Complaint: Preoperative evaulation, perioperative medical management, and complication reduction plan.     Functional Capacity: [place] without CP/SOB.       Anesthesia issues: None    Difficulty mouth opening: No    Steroid use in the last 12 months:  No    Dental Issues: None    Family anesthesia difficulty: None       Family Hx of Thrombosis: None    Past Medical History:   Diagnosis Date    Anticoagulant long-term use     Arthritis     Atrial fibrillation     Basal cell carcinoma     right upper back, left upper arm     BCC (basal cell carcinoma) 12/2020    right temporal scalp    BCC (basal cell carcinoma) 07/2021    left upper back     Colon polyps     tubular adenoma, repeat in 2016    Diabetes mellitus     Guillain-Casa     patient denies history of this condition    Hyperlipidemia 8/25/2017    Hypertension     Prostate CA     Prostate cancer     Sleep apnea     Thyroid disease          Past Medical History Pertinent Negatives:   Diagnosis Date Noted    Asthma  07/25/2018    CHF (congestive heart failure) 07/25/2018    COPD (chronic obstructive pulmonary disease) 07/25/2018    Coronary artery disease 07/25/2018    Encounter for blood transfusion 07/25/2018    Seizures 07/25/2018    Stroke 07/25/2018         Past Surgical History:   Procedure Laterality Date    BASAL CELL CARCINOMA EXCISION      CARDIOVERSION N/A 7/25/2018    Procedure: CARDIOVERSION;  Surgeon: Harvey Blake MD;  Location: [facility] CATH LAB;  Service: Cardiology;  Laterality: N/A;  AF, NEVAEH/DCCV, MAC, SK, 3 Prep    COLONOSCOPY N/A 4/10/2017    Procedure: COLONOSCOPY;  Surgeon: Rubin Solano MD;  Location: [facility] ENDO (4TH FLR);  Service: Endoscopy;  Laterality: N/A;    COLONOSCOPY N/A 11/17/2022    Procedure: COLONOSCOPY;  Surgeon: Darell Jeter MD;  Location: [facility] ENDO (2ND FLR);  Service: Endoscopy;  Laterality: N/A;  Extensive cardiac hx with h/o sina, instr. via portal - PC    PROSTATECTOMY      TONSILLECTOMY      TREATMENT OF CARDIAC ARRHYTHMIA N/A 2/6/2019    Procedure: CARDIOVERSION;  Surgeon: Harvey Blake MD;  Location: [facility] EP LAB;  Service: Cardiology;  Laterality: N/A;  AF, DCCV, MAC, SK, 3 PREP       Review of Systems   Constitutional:  Negative for chills, fatigue, fever and unexpected weight change.   HENT:  Positive for hearing loss. Negative for congestion, rhinorrhea, sore throat, tinnitus and trouble swallowing.    Eyes:  Negative for visual disturbance.   Respiratory:  Negative for cough, chest tightness, shortness of breath and wheezing.    Cardiovascular:  Negative for chest pain, palpitations and leg swelling.   Gastrointestinal:  Negative for constipation and diarrhea.   Genitourinary:  Negative for decreased urine volume, difficulty urinating, dysuria, frequency, hematuria and urgency.   Musculoskeletal:  Negative for arthralgias, back pain, neck pain and neck stiffness.   Skin:  Negative for rash and wound.   Neurological:  Positive for numbness (BLE). Negative for dizziness, syncope,  "weakness and headaches.   Hematological:  Bruises/bleeds easily.   Psychiatric/Behavioral:  Negative for sleep disturbance and suicidal ideas.               VITALS  Visit Vitals  BP (!) 168/71 (BP Location: Left arm, Patient Position: Sitting)   Pulse (!) 49   Temp 98.1 °F (36.7 °C) (Oral)   Ht 5' 7" (1.702 m)   Wt 75 kg (165 lb 5.5 oz)   SpO2 98%   BMI 25.90 kg/m²          Physical Exam  Vitals reviewed.   Constitutional:       General: He is not in acute distress.     Appearance: He is well-developed.   HENT:      Head: Normocephalic.      Nose: Nose normal.      Mouth/Throat:      Pharynx: No oropharyngeal exudate.   Eyes:      General:         Right eye: No discharge.         Left eye: No discharge.      Conjunctiva/sclera: Conjunctivae normal.      Pupils: Pupils are equal, round, and reactive to light.   Neck:      Thyroid: No thyromegaly.      Vascular: No carotid bruit or JVD.      Trachea: No tracheal deviation.   Cardiovascular:      Rate and Rhythm: Normal rate. Rhythm irregularly irregular.      Pulses:           Carotid pulses are 2+ on the right side and 2+ on the left side.       Dorsalis pedis pulses are 2+ on the right side and 1+ on the left side.        Posterior tibial pulses are 2+ on the right side and 1+ on the left side.      Heart sounds: Normal heart sounds. No murmur heard.  Pulmonary:      Effort: Pulmonary effort is normal. No respiratory distress.      Breath sounds: Normal breath sounds. No stridor. No wheezing, rhonchi or rales.   Abdominal:      General: Bowel sounds are normal. There is no distension.      Palpations: Abdomen is soft.      Tenderness: There is no abdominal tenderness. There is no guarding.   Musculoskeletal:      Cervical back: Normal range of motion.      Right lower leg: No edema.      Left lower leg: No edema.   Lymphadenopathy:      Cervical: No cervical adenopathy.   Skin:     General: Skin is warm and dry.      Capillary Refill: Capillary refill takes less " than 2 seconds.      Findings: No erythema or rash.   Neurological:      Mental Status: He is alert and oriented to person, place, and time.   Psychiatric:         Behavior: Behavior normal.          Significant Labs:  Lab Results   Component Value Date    WBC 7.34 02/09/2024    HGB 13.4 (L) 02/09/2024    HCT 39.2 (L) 02/09/2024     02/09/2024    CHOL 103 (L) 02/09/2024    TRIG 60 02/09/2024    HDL 46 02/09/2024    ALT 11 02/09/2024    AST 17 02/09/2024     02/09/2024    K 4.4 02/09/2024     02/09/2024    CREATININE 0.9 02/09/2024    BUN 17 02/09/2024    CO2 23 02/09/2024    TSH 1.010 02/09/2024    PSA <0.01 02/09/2023    INR 1.0 02/01/2019    HGBA1C 6.8 (H) 02/09/2024           EKG:   Results for orders placed or performed in visit on 10/23/23   EKG 12-lead    Collection Time: 10/23/23  9:16 AM    Narrative    Test Reason : I48.19,    Vent. Rate : 042 BPM     Atrial Rate : 312 BPM     P-R Int : 000 ms          QRS Dur : 160 ms      QT Int : 520 ms       P-R-T Axes : 000 -77 -14 degrees     QTc Int : 434 ms    Atrial fibrillation with slow ventricular response (mostly regular RR  interval, cannot rule out high grade AV block)  Left axis deviation  Right bundle branch block  Abnormal ECG  When compared with ECG of 07-MAR-2022 08:27,  No significant change was found  Confirmed by Gavin Fox MD ([id number]) on 10/25/2023 7:53:47 AM    Referred By: [email or web address]   SELF           Confirmed By:Gavin Fox MD       2D ECHO:  TTE:  Results for orders placed or performed during the hospital encounter of 03/21/22   Echo   Result Value Ref Range    Ascending aorta 3.28 cm    STJ 2.64 cm    AV mean gradient 5 mmHg    Ao peak mercedes 1.44 m/s    Ao VTI 33.72 cm    IVS 0.92 0.6 - 1.1 cm    LA size 4.99 cm    Left Atrium Major Axis 6.95 cm    Left Atrium Minor Axis 6.49 cm    LVIDd 5.06 3.5 - 6.0 cm    LVIDs 3.23 2.1 - 4.0 cm    LVOT diameter 2.14 cm    LVOT peak VTI 15.99 cm    Posterior Wall 1.12 (A) 0.6 - 1.1 cm     MV Peak E Juan 0.97 m/s    RA Major Axis 6.37 cm    RA Width 5.48 cm    RVDD 5.21 cm    Sinus 2.82 cm    TAPSE 1.95 cm    TR Max Juan 2.26 m/s    TDI LATERAL 0.11 m/s    TDI SEPTAL 0.07 m/s    LA WIDTH 4.81 cm    LV Diastolic Volume 121.71 mL    LV Systolic Volume 41.77 mL    RV S' 9.53 cm/s    LVOT peak juan 0.73 m/s    LA volume (mod) 93.68 cm3    LV LATERAL E/E' RATIO 8.82 m/s    LV SEPTAL E/E' RATIO 13.86 m/s    FS 36 %    LA volume 136.94 cm3    LV mass 190.59 g    Left Ventricle Relative Wall Thickness 0.44 cm    AV valve area 1.70 cm2    AV Velocity Ratio 0.51     AV index (prosthetic) 0.47     Mean e' 0.09 m/s    LVOT area 3.6 cm2    LVOT stroke volume 57.48 cm3    AV peak gradient 8 mmHg    E/E' ratio 10.78 m/s    Triscuspid Valve Regurgitation Peak Gradient 20 mmHg    BSA 2.04 m2    LV Systolic Volume Index 20.8 mL/m2    LV Diastolic Volume Index 60.55 mL/m2    LA Volume Index 68.1 mL/m2    LV Mass Index 95 g/m2    LA Volume Index (Mod) 46.6 mL/m2    Right Atrial Pressure (from IVC) 8 mmHg    EF 55 %    RA area 32.00 cm2    TV resting pulmonary artery pressure 28 mmHg    Narrative    · Bradycardia was present during the study. HR mid 40s  · The estimated ejection fraction is 55%.  · The left ventricle is normal in size with concentric remodeling and   normal systolic function.  · Normal left ventricular diastolic function.  · Normal right ventricular size with normal right ventricular systolic   function.  · Severe left atrial enlargement.  · Moderate right atrial enlargement.  · Mild tricuspid regurgitation.  · The estimated PA systolic pressure is 28 mmHg.  · Intermediate central venous pressure (8 mmHg).          NEVAEH: 2018   CONCLUSIONS     1 - Biatrial enlargement.     2 - No LA or EDGAR thrombus.     3 - Mildly depressed left ventricular systolic function (EF 45-50%).     4 - Enlarged RV with normal RV systolic function.     5 - Moderate tricuspid regurgitation.     6 - Mild mitral regurgitation.            Active Cardiac Conditions: None      Revised Cardiac Risk Index   High -Risk Surgery  Intraperitoneal; Intrathoracic; suprainguinal vascular Yes- + 1 No- 0   History of Ischemic Heart Disease   (Hx of MI/positive exercise test/current chest pain due to ischemia/use of nitrate therapy/EKG with pathological Q waves) Yes- + 1 No- 0   History of CHF  (Pulmonary edema/bilateral rales or S3 gallop/PND/CXR showing pulmonary vascular redistribution) Yes- + 1 No- 0   History of CVA   (Prior stroke or TIA) Yes- + 1 No- 0   Pre-operative treatment with insulin Yes- + 1 No- 0   Pre-operative creatinine > 2mg/dl Yes- + 1 No- 0   Total: 0      Risk Status:  Estimated risk of cardiac complications after non-cardiac surgery using the Revised Cardiac Risk Index for Preoperative risk is 3.9 %      ARISCAT (Canet) risk index: Low: 1.6% risk of post-op pulmonary complications.    American Society of Anesthesiologists Physical Status classification (ASA): 3                            Assessment/Plan:     Digital mucinous cyst of finger of right hand  Scheduled with Dr. Peña on 4/5/24 for excision of cyst to right middle finger.     Essential hypertension  Current BP  not at goal today; 168/71.    Taking: Lisinopril- did not take this AM  Clinic readings since march 2023: Systolic 120-164/Diastolic: 64-84    Lifestyle changes to reduce systolic BP:   exercise 30 minutes per day,  5 days per week or 150 minutes weekly; sodium reduction and avoidance of high salt foods such as processed meats, frozen meals and  fast foods.   Keeping a healthy weight/BMI can help with better BP control    BP acceptable for surgery. I recommend monitoring BP during perioperative period as uncontrolled pain can elevate blood pressure.         Persistent atrial fibrillation  Treated with Eliquis; stable  S/P cardioversion x 2 in 2018 and 2019  Denies bleeding issues.   Followed by: cardiology- will request instructions      Cardiomyopathy,  nonischemic  Recovered per EP note  Followed per Cardiology    Denies CP/SOB/fatigue/PND/orthopnea/syncope/edema      Echo: 3/21/22  Bradycardia was present during the study. HR mid 40s  The estimated ejection fraction is 55%.  The left ventricle is normal in size with concentric remodeling and normal systolic function.  Normal left ventricular diastolic function.  Normal right ventricular size with normal right ventricular systolic function.  Severe left atrial enlargement.  Moderate right atrial enlargement.  Mild tricuspid regurgitation.  The estimated PA systolic pressure is 28 mmHg.  Intermediate central venous pressure (8 mmHg).      Anticoagulant long-term use  Currently on Eliquis for Atrial Fibrillation  Awaiting holding instructions    History of prostatectomy  S/P robotic prostatectomy in 2009 for prostate cancer.  Last seen by Urology in 2018  Denies LUTS  PSA: normal 2/9/23    Type 2 diabetes mellitus with hyperglycemia, without long-term current use of insulin  Currently takes: metformin/Mounjaro   Most recent A1c is 6.8.      Reports peripheral neuropathy.   Denies visual changes. Up to date with eye exams.  Micro changes: Denies- Retinopathy, Nephropathy   Macro changes: Denies-  Carotid, Coronary , Peripheral disease         Hypothyroidism due to acquired atrophy of thyroid  Currently treated with Levothyroxine 88 mcg  TSH: normal 2/9/24  Denies Hx of nodules.   Followed by PCP.    Colon polyps  S/P Colonoscopy 11/17/22  COLON, POLYPECTOMY:   Multiple fragments of tubular adenoma (s)    Nonalcoholic fatty liver disease  S/P liver biopsy in 2016:  FINAL PATHOLOGIC DIAGNOSIS LIVER (NEEDLE BIOPSY): -Rare focus of steatohepatitis -Glycogenated nuclei -Macrosteatosis, 2% -Microsteatosis, 5% -Portal expansion (stage 0 -1 out of 4) -No iron   No recent imaging  No recent INR  Platelet count: 181,000  Denies alcohol excess  No longer followed per Hepatology        Gilbert's syndrome  Bilirubin : 2.7 on  2/9/24      Gastroesophageal reflux disease  Not currently treated- no recent symptoms    Obstructive sleep apnea  CPAP compliance: Yes.       Recommend caution with sedating medication that can cause respiratory depression.         Normocytic anemia  Current labs:  Hgb-  13.4    Hct- 39.2     Recommend monitoring during perioperative period.       Discussion/Management of Perioperative Care    Thromboembolic prophylaxis (VTE) Care: Risk factors for thrombosis include: age and surgical procedure.  I recommend prophylaxis of thromboembolism with the use of compression stockings/pneumatic devices, and/or pharmacologic agents. The benefits should outweigh the risks for pharmacologic prophylaxis in the perioperative period. I also encourage early ambulation if not contraindicated during the post-operative period.    Risk factors for post-operative pulmonary complications include:age > 65 years, diabetes mellitus, and HTN. To reduce the risk of pulmonary complications, prophylactic recommendations include: incentive spirometry use/deep breathing, end tidal carbon dioxide monitoring, and pain control.    Risk factors for renal complications include: age, diabetes mellitus, and HTN. To reduce the risk of postoperative renal complications, I recommend the patient maintain adequate fluid volume status by drinking 2 liters of water daily.  Avoid/reduce NSAIDS and SERNA-2 inhibitors use as well as IV contrast for renal protection.    I recommend the use of appropriate prophylactic antibiotics to reduce the risk of surgical site infections.    Delirium risk factors include advanced age. I recommend to avoid/reduce use of benzodiazepine use (not for patients who take on a regular basis), anticholinergics, Benadryl,  and agents that may cause postoperative serotonin syndrome.  Controlled pain can decrease the risk for postop delirium and since opioids are used for postoperative pain control, I suggest using the lowest dose for the  shortest amount of time necessary for pain management.     The patient is at an increased risk for urinary retention due to : advanced age. I recommend to avoid/decrease the use of benzodiazepines, anticholinergics, and Benadryl in the perioperative period. I also recommend using opioids for the shortest period of time if possible.        Diabetes Mellitus-I recommend monitoring the glucose in the perioperative period ( Before meals and bed time,if the patient is on oral feeds or every 6 hourly ,if the patient is NPO ).   Blood glucose target in hospitalized patients is 140-180. Oral Hypoglycemic agents are generally avoided during the hospital stay . If glucose is consistently elevated ,I recommend using a basal prandial Insulin regimen to control the glucose - as elevated glucose can be associated with adverse surgical outcomes. Please consider involving Hospital Medicine or Endocrinology , if any help is needed with Glucose control. Patient will be instructed based on the pre op clinic guidelines about adjustment of diabetic treatment (If applicable). These guidelines are per recommendations of the Endocrinology department.         This visit was focused on Preoperative evaluation, Perioperative Medical management, complication reduction plans. I suggest that the patient follows up with primary care or relevant sub specialists for ongoing health care.    I appreciate the opportunity to be involved in this patients care. Please feel free to contact me if there were any questions about this consultation.        I spent a total of 48 minutes on the day of the visit.This includes face to face time and non-face to face time preparing to see the patient (e.g., review of tests), obtaining and/or reviewing separately obtained history, documenting clinical information in the electronic or other health record, independently interpreting results and communicating results to the patient/family/caregiver, or care  coordinator.       Patient is optimized for surgery.    4/3/24: Eliquis to be held 48 hrs before surgery per Dr. Mullen.       Sabi Gibbs NP  Perioperative Medicine  [facility]

## 2024-03-25 NOTE — DISCHARGE INSTRUCTIONS
Your surgery has been scheduled for:_______4/5/24___________________________________    You [facility] to:  ____[facility], located on the [place] side of the first floor of the           [facility] ([phone])  ____[facility], located on the [facility] side of the            Second floor of the [facility] [phone])  ____3rd Floor [facility] located on the [facility] side of the [facility] [phone]  __X__[facility]: located at [address] [facility] [place] [address]. [address].     Please Note   Tell your doctor if you take Aspirin, products containing Aspirin, herbal medications  or blood thinners, such as Coumadin, Ticlid, or Plavix.  (Consult your provider regarding holding or stopping before surgery).  Arrange for someone to drive you home following surgery.  You will not be allowed to leave the surgical facility alone or drive yourself home following sedation and anesthesia.    Before Surgery  Stop taking all herbal medications, vitamins, and supplements 7 days prior to surgery  No Motrin/Advil (Ibuprofen) 7 days before surgery  No Aleve (Naproxen) 7 days before surgery   No Goody's/BC Powder 7 days before surgery  Refrain from drinking alcoholic beverages for 24 hours before and after surgery  Stop or limit smoking at least 24 hours prior to surgery  You may take Tylenol for pain    Night before Surgery  Do not eat or drink after midnight  Take a shower or bath (shower is recommended).  Bathe with Hibiclens soap or an antibacterial soap from the neck down.  If not supplied by your surgeon, hibiclens soap will need to be purchased over the counter in pharmacy.  Rinse soap off thoroughly.  Shampoo your hair with your regular shampoo    The Day of Surgery  Take another bath or shower with hibiclens or any antibacterial soap, to reduce the chance of infection.  Take heart and  blood pressure medications with a small sip of water, as advised by the perioperative team.  Do not take fluid pills  You may brush your teeth and rinse your mouth, but do not swallow any additional water.   Do not apply perfumes, powder, body lotions or deodorant on the day of surgery.  Nail polish should be removed.  Do not wear makeup or moisturizer  Wear comfortable clothes, such as a button front shirt and loose fitting pants.  Leave all jewelry, including body piercings, and valuables at home.    Bring any devices you will need after surgery such as crutches or canes.  If you have sleep apnea, please bring your CPAP machine  In the event that your physical condition changes including the onset of a cold or respiratory illness, or if you have to delay or cancel your surgery, please notify your surgeon.

## 2024-03-28 ENCOUNTER — TELEPHONE (OUTPATIENT)
Dept: PREADMISSION TESTING | Facility: HOSPITAL | Age: 79
End: 2024-03-28
Payer: MEDICARE

## 2024-03-28 NOTE — TELEPHONE ENCOUNTER
Sent 2nd request in basket to  and staff requesting Eliquis 5mg hold instructions for upcoming surgery.

## 2024-03-28 NOTE — TELEPHONE ENCOUNTER
----- Message from Sabi Gibbs NP sent at 3/27/2024  2:24 PM CDT -----  Moisés Earl,    Can you resend request to Dr. Mullen about Elijacquelnie instructions. Running out of time.    Thanks,  Sabi

## 2024-04-03 ENCOUNTER — TELEPHONE (OUTPATIENT)
Dept: PREADMISSION TESTING | Facility: HOSPITAL | Age: 79
End: 2024-04-03
Payer: MEDICARE

## 2024-04-03 PROBLEM — D69.2 SENILE PURPURA: Status: ACTIVE | Noted: 2024-04-03

## 2024-04-03 NOTE — TELEPHONE ENCOUNTER
Spoke to patient, instructed him to hold Eliquis 48 hours prior to surgery, which begins today. He did not take the Eliquis today and will start holding.

## 2024-04-04 RX ORDER — TRAMADOL HYDROCHLORIDE 50 MG/1
50 TABLET ORAL EVERY 6 HOURS
Qty: 20 TABLET | Refills: 0 | Status: SHIPPED | OUTPATIENT
Start: 2024-04-04

## 2024-04-04 RX ORDER — IBUPROFEN 600 MG/1
600 TABLET ORAL 3 TIMES DAILY
Qty: 30 TABLET | Refills: 0 | Status: SHIPPED | OUTPATIENT
Start: 2024-04-04 | End: 2024-04-15

## 2024-04-04 RX ORDER — ACETAMINOPHEN 500 MG
1000 TABLET ORAL 3 TIMES DAILY
Qty: 60 TABLET | Refills: 0 | Status: SHIPPED | OUTPATIENT
Start: 2024-04-04 | End: 2024-04-15

## 2024-04-05 ENCOUNTER — ANESTHESIA (OUTPATIENT)
Dept: SURGERY | Facility: HOSPITAL | Age: 79
End: 2024-04-05
Payer: MEDICARE

## 2024-04-05 ENCOUNTER — HOSPITAL ENCOUNTER (OUTPATIENT)
Facility: HOSPITAL | Age: 79
Discharge: HOME | End: 2024-04-05
Attending: ORTHOPAEDIC SURGERY | Admitting: ORTHOPAEDIC SURGERY
Payer: MEDICARE

## 2024-04-05 VITALS
RESPIRATION RATE: 11 BRPM | DIASTOLIC BLOOD PRESSURE: 58 MMHG | BODY MASS INDEX: 25.9 KG/M2 | HEIGHT: 67 IN | OXYGEN SATURATION: 99 % | TEMPERATURE: 98 F | HEART RATE: 58 BPM | SYSTOLIC BLOOD PRESSURE: 108 MMHG | WEIGHT: 165 LBS

## 2024-04-05 DIAGNOSIS — M67.441 DIGITAL MUCINOUS CYST OF FINGER OF RIGHT HAND: Primary | ICD-10-CM

## 2024-04-05 LAB
POCT GLUCOSE: 159 MG/DL (ref 70–110)
POCT GLUCOSE: 170 MG/DL (ref 70–110)

## 2024-04-05 PROCEDURE — D9220A PRA ANESTHESIA: Mod: CRNA,,, | Performed by: NURSE ANESTHETIST, CERTIFIED REGISTERED

## 2024-04-05 PROCEDURE — 82962 GLUCOSE BLOOD TEST: CPT | Performed by: ORTHOPAEDIC SURGERY

## 2024-04-05 PROCEDURE — 25000003 PHARM REV CODE 250: Performed by: NURSE ANESTHETIST, CERTIFIED REGISTERED

## 2024-04-05 PROCEDURE — D9220A PRA ANESTHESIA: Mod: ANES,,, | Performed by: ANESTHESIOLOGY

## 2024-04-05 PROCEDURE — 25000003 PHARM REV CODE 250: Performed by: ORTHOPAEDIC SURGERY

## 2024-04-05 PROCEDURE — 36000706: Performed by: ORTHOPAEDIC SURGERY

## 2024-04-05 PROCEDURE — 88307 TISSUE EXAM BY PATHOLOGIST: CPT | Performed by: PATHOLOGY

## 2024-04-05 PROCEDURE — 63600175 PHARM REV CODE 636 W HCPCS: Performed by: NURSE ANESTHETIST, CERTIFIED REGISTERED

## 2024-04-05 PROCEDURE — 37000009 HC ANESTHESIA EA ADD 15 MINS: Performed by: ORTHOPAEDIC SURGERY

## 2024-04-05 PROCEDURE — 25000003 PHARM REV CODE 250: Performed by: PHYSICIAN ASSISTANT

## 2024-04-05 PROCEDURE — 26160 REMOVE TENDON SHEATH LESION: CPT | Mod: F6,,, | Performed by: ORTHOPAEDIC SURGERY

## 2024-04-05 PROCEDURE — 99900035 HC TECH TIME PER 15 MIN (STAT)

## 2024-04-05 PROCEDURE — 27201423 OPTIME MED/SURG SUP & DEVICES STERILE SUPPLY: Performed by: ORTHOPAEDIC SURGERY

## 2024-04-05 PROCEDURE — 82962 GLUCOSE BLOOD TEST: CPT | Mod: 91 | Performed by: ORTHOPAEDIC SURGERY

## 2024-04-05 PROCEDURE — 71000015 HC POSTOP RECOV 1ST HR: Performed by: ORTHOPAEDIC SURGERY

## 2024-04-05 PROCEDURE — 88307 TISSUE EXAM BY PATHOLOGIST: CPT | Mod: 26,,, | Performed by: PATHOLOGY

## 2024-04-05 PROCEDURE — 94761 N-INVAS EAR/PLS OXIMETRY MLT: CPT

## 2024-04-05 PROCEDURE — 37000008 HC ANESTHESIA 1ST 15 MINUTES: Performed by: ORTHOPAEDIC SURGERY

## 2024-04-05 PROCEDURE — 71000033 HC RECOVERY, INTIAL HOUR: Performed by: ORTHOPAEDIC SURGERY

## 2024-04-05 PROCEDURE — 36000707: Performed by: ORTHOPAEDIC SURGERY

## 2024-04-05 RX ORDER — LIDOCAINE HYDROCHLORIDE 20 MG/ML
INJECTION INTRAVENOUS
Status: DISCONTINUED | OUTPATIENT
Start: 2024-04-05 | End: 2024-04-05

## 2024-04-05 RX ORDER — HALOPERIDOL 5 MG/ML
0.5 INJECTION INTRAMUSCULAR EVERY 10 MIN PRN
Status: DISCONTINUED | OUTPATIENT
Start: 2024-04-05 | End: 2024-04-05 | Stop reason: HOSPADM

## 2024-04-05 RX ORDER — CEFAZOLIN SODIUM 1 G/3ML
INJECTION, POWDER, FOR SOLUTION INTRAMUSCULAR; INTRAVENOUS
Status: DISCONTINUED | OUTPATIENT
Start: 2024-04-05 | End: 2024-04-05

## 2024-04-05 RX ORDER — MIDAZOLAM HYDROCHLORIDE 1 MG/ML
INJECTION, SOLUTION INTRAMUSCULAR; INTRAVENOUS
Status: DISCONTINUED | OUTPATIENT
Start: 2024-04-05 | End: 2024-04-05

## 2024-04-05 RX ORDER — SODIUM CHLORIDE 0.9 % (FLUSH) 0.9 %
10 SYRINGE (ML) INJECTION
Status: DISCONTINUED | OUTPATIENT
Start: 2024-04-05 | End: 2024-04-05 | Stop reason: HOSPADM

## 2024-04-05 RX ORDER — ONDANSETRON HYDROCHLORIDE 2 MG/ML
INJECTION, SOLUTION INTRAVENOUS
Status: DISCONTINUED | OUTPATIENT
Start: 2024-04-05 | End: 2024-04-05

## 2024-04-05 RX ORDER — FAMOTIDINE 10 MG/ML
INJECTION INTRAVENOUS
Status: DISCONTINUED | OUTPATIENT
Start: 2024-04-05 | End: 2024-04-05

## 2024-04-05 RX ORDER — PROPOFOL 10 MG/ML
VIAL (ML) INTRAVENOUS CONTINUOUS PRN
Status: DISCONTINUED | OUTPATIENT
Start: 2024-04-05 | End: 2024-04-05

## 2024-04-05 RX ORDER — LIDOCAINE HYDROCHLORIDE 10 MG/ML
INJECTION INFILTRATION; PERINEURAL
Status: DISCONTINUED | OUTPATIENT
Start: 2024-04-05 | End: 2024-04-05 | Stop reason: HOSPADM

## 2024-04-05 RX ORDER — HYDROMORPHONE HYDROCHLORIDE 1 MG/ML
0.2 INJECTION, SOLUTION INTRAMUSCULAR; INTRAVENOUS; SUBCUTANEOUS EVERY 5 MIN PRN
Status: DISCONTINUED | OUTPATIENT
Start: 2024-04-05 | End: 2024-04-05 | Stop reason: HOSPADM

## 2024-04-05 RX ORDER — ACETAMINOPHEN 500 MG
1000 TABLET ORAL
Status: COMPLETED | OUTPATIENT
Start: 2024-04-05 | End: 2024-04-05

## 2024-04-05 RX ORDER — OXYCODONE HYDROCHLORIDE 5 MG/1
5 TABLET ORAL
Status: DISCONTINUED | OUTPATIENT
Start: 2024-04-05 | End: 2024-04-05 | Stop reason: HOSPADM

## 2024-04-05 RX ORDER — FENTANYL CITRATE 50 UG/ML
INJECTION, SOLUTION INTRAMUSCULAR; INTRAVENOUS
Status: DISCONTINUED | OUTPATIENT
Start: 2024-04-05 | End: 2024-04-05

## 2024-04-05 RX ORDER — SODIUM CHLORIDE 9 MG/ML
INJECTION, SOLUTION INTRAVENOUS CONTINUOUS
Status: DISCONTINUED | OUTPATIENT
Start: 2024-04-05 | End: 2024-04-05 | Stop reason: HOSPADM

## 2024-04-05 RX ORDER — CELECOXIB 200 MG/1
400 CAPSULE ORAL
Status: COMPLETED | OUTPATIENT
Start: 2024-04-05 | End: 2024-04-05

## 2024-04-05 RX ORDER — METOCLOPRAMIDE HYDROCHLORIDE 5 MG/ML
10 INJECTION INTRAMUSCULAR; INTRAVENOUS EVERY 10 MIN PRN
Status: DISCONTINUED | OUTPATIENT
Start: 2024-04-05 | End: 2024-04-05 | Stop reason: HOSPADM

## 2024-04-05 RX ORDER — MUPIROCIN 20 MG/G
OINTMENT TOPICAL
Status: DISCONTINUED | OUTPATIENT
Start: 2024-04-05 | End: 2024-04-05 | Stop reason: HOSPADM

## 2024-04-05 RX ORDER — PROPOFOL 10 MG/ML
VIAL (ML) INTRAVENOUS
Status: DISCONTINUED | OUTPATIENT
Start: 2024-04-05 | End: 2024-04-05

## 2024-04-05 RX ADMIN — MIDAZOLAM HYDROCHLORIDE 2 MG: 2 INJECTION, SOLUTION INTRAMUSCULAR; INTRAVENOUS at 09:04

## 2024-04-05 RX ADMIN — CELECOXIB 400 MG: 200 CAPSULE ORAL at 07:04

## 2024-04-05 RX ADMIN — SODIUM CHLORIDE: 0.9 INJECTION, SOLUTION INTRAVENOUS at 08:04

## 2024-04-05 RX ADMIN — SODIUM CHLORIDE: 9 INJECTION, SOLUTION INTRAVENOUS at 09:04

## 2024-04-05 RX ADMIN — FENTANYL CITRATE 25 MCG: 50 INJECTION, SOLUTION INTRAMUSCULAR; INTRAVENOUS at 09:04

## 2024-04-05 RX ADMIN — ACETAMINOPHEN 1000 MG: 500 TABLET ORAL at 07:04

## 2024-04-05 RX ADMIN — CEFAZOLIN 2 G: 330 INJECTION, POWDER, FOR SOLUTION INTRAMUSCULAR; INTRAVENOUS at 09:04

## 2024-04-05 RX ADMIN — PROPOFOL 50 MG: 10 INJECTION, EMULSION INTRAVENOUS at 09:04

## 2024-04-05 RX ADMIN — LIDOCAINE HYDROCHLORIDE 50 MG: 20 INJECTION INTRAVENOUS at 09:04

## 2024-04-05 RX ADMIN — ONDANSETRON 4 MG: 2 INJECTION INTRAMUSCULAR; INTRAVENOUS at 09:04

## 2024-04-05 RX ADMIN — MUPIROCIN: 20 OINTMENT TOPICAL at 07:04

## 2024-04-05 RX ADMIN — PROPOFOL 100 MCG/KG/MIN: 10 INJECTION, EMULSION INTRAVENOUS at 09:04

## 2024-04-05 RX ADMIN — FAMOTIDINE 20 MG: 10 INJECTION, SOLUTION INTRAVENOUS at 09:04

## 2024-04-05 NOTE — DISCHARGE INSTRUCTIONS
AFTER HAND SURGERY    DOS:  Keep affected wrist elevated above the level of your heart  Check circulation frequently in fingers by pressing on the nail bed. Nail bed should turn white and then pink when released.  Exercise fingers to promote circulation.  Advance diet as tolerated  Resume home medications today.  In 5 days, you  may shower after you remove the nimbus pain pump.  Keep sling on until you remove the nimbus pain pump and regain your feeling.      DONT:  No driving for 24 hours or while taking narcotic pain medication      CALL PHYSICIAN FOR:  Obvious bleeding  Excessive swelling  Drainage (pus) from the wound  Pain unrelieved by pain medication.

## 2024-04-05 NOTE — TRANSFER OF CARE
"Anesthesia Transfer of Care Note    Patient: Erasmo Cherry    Procedure(s) Performed: Procedure(s) (LRB):  EXCISION, CYST - mucus cyst right long finger (Right)    Patient location: PACU    Anesthesia Type: general    Transport from OR: Transported from OR on 100% O2 by closed face mask with adequate spontaneous ventilation    Post pain: adequate analgesia    Post assessment: no apparent anesthetic complications and tolerated procedure well    Post vital signs: stable    Level of consciousness: sedated and responds to stimulation    Nausea/Vomiting: no nausea/vomiting    Complications: none    Transfer of care protocol was followed    Last vitals: Visit Vitals  BP (!) 142/71   Pulse (!) 55   Temp 36.7 °C (98.1 °F) (Oral)   Resp 18   Ht 5' 7" (1.702 m)   Wt 74.8 kg (165 lb)   SpO2 99%   BMI 25.84 kg/m²     "

## 2024-04-05 NOTE — PLAN OF CARE
Preop complete. Pt resting comfortably. No visitor here at this time. Belongings secured in locker. Call light in reach, side rails up, bed in lowest position

## 2024-04-05 NOTE — PLAN OF CARE
VSS.  Patient tolerating oral liquids without difficulty.   No apparent s&s of distress noted at this time, no complaints voiced at this time.   Discharge instructions reviewed with patient/family/friend with good verbal feedback received.   Post op medications bedside, DME none.  Patient ready for discharge.

## 2024-04-05 NOTE — H&P
[facility]  History & Physical  Orthopedics     SUBJECTIVE:       Chief Complaint: Right long finger cyst     Referring Provider: Caty Corbin MD Dr. Dunbar is the supervising physician for this encounter/patient     History of Present Illness:  Patient is a 78 y.o. right hand dominant male who presents today with complaints of right long finger cyst present for over 1 year. No trauma/injury. Present just proximal to the nail fold. No infection. He has tried topical treatments which help varyingly. He does get pain when he hits the cyst. He would like to have this surgically removed.      The patient is a/an [employer].     Onset of symptoms/DOI was 1+ year.     Symptoms are aggravated by activity.     Symptoms are alleviated by rest.     Symptoms consist of pain.     The patient rates their pain as a 1/10.     Attempted treatment(s) and/or interventions include activity modifications, rest,  topical anti-inflammtories .     The patient denies any fevers, chills, N/V, D/C and presents for evaluation.             Past Medical History:   Diagnosis Date    Anticoagulant long-term use      Arthritis      Atrial fibrillation      Basal cell carcinoma       right upper back, left upper arm     BCC (basal cell carcinoma) 12/2020     right temporal scalp    BCC (basal cell carcinoma) 07/2021     left upper back     Colon polyps       tubular adenoma, repeat in 2016    Diabetes mellitus      Guillain-[place]       patient denies history of this condition    Hyperlipidemia 8/25/2017    Hypertension      Prostate CA      Prostate cancer      Sleep apnea      Thyroid disease              Past Surgical History:   Procedure Laterality Date    BASAL CELL CARCINOMA EXCISION        CARDIOVERSION N/A 7/25/2018     Procedure: CARDIOVERSION;  Surgeon: Harvey Blake MD;  Location: [facility] CATH LAB;  Service: Cardiology;  Laterality: N/A;  AF, NEVAEH/DCCV, MAC, SK, 3 Prep    COLONOSCOPY N/A 4/10/2017      Procedure: COLONOSCOPY;  Surgeon: Rubin Solano MD;  Location: [facility] ENDO (4TH FLR);  Service: Endoscopy;  Laterality: N/A;    COLONOSCOPY N/A 11/17/2022     Procedure: COLONOSCOPY;  Surgeon: Darell Jeter MD;  Location: [facility] ENDO (2ND FLR);  Service: Endoscopy;  Laterality: N/A;  Extensive cardiac hx with h/o sina, instr. via portal - PC    PROSTATECTOMY        TONSILLECTOMY        TREATMENT OF CARDIAC ARRHYTHMIA N/A 2/6/2019     Procedure: CARDIOVERSION;  Surgeon: Harvey Blake MD;  Location: [facility] EP LAB;  Service: Cardiology;  Laterality: N/A;  AF, DCCV, MAC, SK, 3 PREP      Review of patient's allergies indicates:  No Known Allergies  Social History          Social History Narrative    Not on file            Family History   Problem Relation Age of Onset    Diabetes Mother      Heart disease Father      Asthma Sister      Diabetes Sister      Skin cancer Neg Hx      Melanoma Neg Hx              Current Outpatient Medications:     atorvastatin (LIPITOR) 20 MG tablet, Take 1 tablet (20 mg total) by mouth once daily., Disp: 90 tablet, Rfl: 3    ELIQUIS 5 mg Tab, TAKE 1 TABLET TWICE A DAY, Disp: 180 tablet, Rfl: 3    ergocalciferol (VITAMIN D2) 50,000 unit Cap, TAKE 1 CAPSULE TWICE A WEEK, Disp: 24 capsule, Rfl: 3    fluorouraciL (EFUDEX) 5 % cream, Mix with calcipotriene 0.005% cream  and Apply thin film to right forehead and temple and cheek  2 times per day for 7 days; d/c if area bleeding or ulcerated; avoid eyes or mouth, Disp: 40 g, Rfl: 1    FREESTYLE AURE 14 DAY READER [facility], TEST BLOOD SUGAR BID., Disp: 6 each, Rfl: 6    FREESTYLE AURE 14 DAY SENSOR Kit, USE TO TEST TWICE DAILY REMOVE AND REPLACE EEVRY 2 WEEKS, Disp: 2 kit, Rfl: 3    FREESTYLE AURE 14 DAY SENSOR Kit, USE TO TEST BLOOD SUGAR TWICE DAILY AND REPLACE EVERY 2 WEEKS, Disp: 2 kit, Rfl: 6    levothyroxine (SYNTHROID) 88 MCG tablet, Take 1 tablet (88 mcg total) by mouth before breakfast., Disp: 90 tablet, Rfl: 3    lisinopriL (PRINIVIL,ZESTRIL) 40  MG tablet, TAKE 1 TABLET DAILY, Disp: 90 tablet, Rfl: 3    metFORMIN (GLUCOPHAGE-XR) 750 MG ER 24hr tablet, Take 1 tablet (750 mg total) by mouth 2 (two) times daily with meals., Disp: 180 tablet, Rfl: 3    metoprolol succinate (TOPROL-XL) 25 MG 24 hr tablet, TAKE 1 TABLET DAILY, Disp: 90 tablet, Rfl: 3    tirzepatide (MOUNJARO) 5 mg/0.5 mL PnIj, INJECT 5 MG UNDER THE SKIN EVERY 7 DAYS, Disp: 12 pen , Rfl: 3    vitamin E 800 UNIT capsule, Take 800 Units by mouth once daily., Disp: , Rfl:   No current facility-administered medications for this visit.     Facility-Administered Medications Ordered in Other Visits:     sodium chloride 0.9% flush 5 mL, 5 mL, Intravenous, PRN, Cathy Hickman NP        Review of Systems:  Constitutional: no fever or chills  Eyes: no visual changes  ENT: no nasal congestion or sore throat  Respiratory: no cough or shortness of breath  Cardiovascular: no chest pain  Gastrointestinal: no nausea or vomiting, tolerating diet  Musculoskeletal: pain and soreness     OBJECTIVE:       Vital Signs (Most Recent):  Vitals   There were no vitals filed for this visit.     There is no height or weight on file to calculate BMI.        Physical Exam:  Constitutional: The patient appears well-developed and well-nourished. No distress.   Skin: No lesions appreciated  Head: Normocephalic and atraumatic.   Nose: Nose normal.   Ears: No deformities seen  Eyes: Conjunctivae and EOM are normal.   Neck: No tracheal deviation present.   Cardiovascular: Normal rate and intact distal pulses.    Pulmonary/Chest: Effort normal. No respiratory distress.   Abdominal: There is no guarding.   Neurological: The patient is alert.   Psychiatric: The patient has a normal mood and affect.      Right Hand/Wrist Examination:     Observation/Inspection:  Swelling                       none                  Deformity                     none  Discoloration               none                  Scars                            none                  Atrophy                        none  Small digital mucous cyst noted to the dorsal distal phalanx just proximal to the nail fold of the long finger. No signs of infection. Flattening of the nail plate noted     HAND/WRIST EXAMINATION:  Finkelstein's Test                                Neg  WHAT Test                                         Neg  Snuff box tenderness                          Neg  Mata's Test                                     Neg  Hook of [place]ate Tenderness              Neg  CMC grind                                           Neg  Circumduction test                              Neg     Neurovascular Exam:  Digits WWP, brisk CR < 3s throughout  NVI motor/LTS to M/R/U nerves, radial pulse 2+  Tinel's Test - Carpal Tunnel                Neg  Tinel's Test - Cubital Tunnel               Neg  Phalen's Test                                      Neg  Median Nerve Compression Test       Neg     ROM hand full, painless     ROM wrist full, painless    ROM elbow full, painless     Abdomen not guarded  Respirations nonlabored  Perfusion intact     Diagnostic Results:     Imaging - I independently viewed the patient's imaging as well as the radiology report.  Xrays of the patient's right long finger  demonstrate no evidence of any acute fractures or dislocations, positive for degenerative changes.     EMG - none     ASSESSMENT/PLAN:       78 y.o. yo male with Right long finger digital mucous cyst     Plan: The patient and I had a thorough discussion today.  We discussed the working diagnosis as well as several other potential alternative diagnoses.  Treatment options were discussed, both conservative and surgical.  Conservative treatment options would include things such as activity modifications, workplace modifications, a period of rest, oral vs topical OTC and prescription anti-inflammatory medications, occupational therapy, splinting/bracing, immobilization, corticosteroid injections, and  others.  Surgical options were discussed as well.      At this time, the patient would like to proceed with surgery. He is consented for Right long finger cyst excision, he will message us back with the requested date and I will order/post the case. I have messaged preop clearance clinic, he does have Afib and is on Eliquis.     The patient has not responded to adequate non operative treatment at this time and/or non operative treatment is not indicated. Thus, the risks, benefits and alternatives to surgery were discussed with the patient in detail.  Specific risks include but are not limited to bleeding, infection, vessel and/or nerve damage, pain, numbness, tingling, complex regional pain syndrome, compartment syndrome, failure to return to pre-injury and/or preoperative functional status, scar sensitivity, delayed healing, inability to return to work, pulley injury, tendon injury, bowstringing, partial and/or incomplete relief of symptoms, weakness, persistence of and/or worsening of symptoms, surgical failure, osteomyelitis, amputation, loss of function, stiffness, functional debility, dysfunction, decreased  strength, need for prolonged postoperative rehabilitation, need for further surgery, deep venous thrombosis, pulmonary embolism, arthritis and death.  The patient states an understanding and wishes to proceed with surgery.   All questions were answered.  No guarantees were implied or stated.  Written informed consent was obtained.     Should the patient's symptoms worsen, persist, or fail to improve they should return for reevaluation and I would be happy to see them back anytime.              Please do not hesitate to reach out to us via email, phone, or MyChart with any questions, concerns, or feedback.

## 2024-04-05 NOTE — ANESTHESIA PREPROCEDURE EVALUATION
04/05/2024  Pre-operative evaluation for Procedure(s) (LRB):  EXCISION, CYST - mucus cyst right long finger (Right)    Erasmo Cherry is a 78 y.o. male     Past Medical History:   Diagnosis Date    Anticoagulant long-term use     Arthritis     Atrial fibrillation     Basal cell carcinoma     right upper back, left upper arm     BCC (basal cell carcinoma) 12/2020    right temporal scalp    BCC (basal cell carcinoma) 07/2021    left upper back     Colon polyps     tubular adenoma, repeat in 2016    Diabetes mellitus     Guillain-[place]     patient denies history of this condition    Hyperlipidemia 08/25/2017    Hypertension     Prostate CA     Prostate cancer     Sleep apnea     Thyroid disease        Review of patient's allergies indicates:  No Known Allergies    Current Facility-Administered Medications on File Prior to Encounter   Medication Dose Route Frequency Provider Last Rate Last Admin    sodium chloride 0.9% flush 5 mL  5 mL Intravenous PRN Cathy Hickman NP         Current Outpatient Medications on File Prior to Encounter   Medication Sig Dispense Refill    ergocalciferol (VITAMIN D2) 50,000 unit Cap TAKE 1 CAPSULE TWICE A WEEK 24 capsule 3    levothyroxine (SYNTHROID) 88 MCG tablet Take 1 tablet (88 mcg total) by mouth before breakfast. 90 tablet 3    lisinopriL (PRINIVIL,ZESTRIL) 40 MG tablet Take 1 tablet (40 mg total) by mouth once daily. 90 tablet 3    metFORMIN (GLUCOPHAGE-XR) 750 MG ER 24hr tablet Take 1 tablet (750 mg total) by mouth 2 (two) times daily with meals. 180 tablet 3    metoprolol succinate (TOPROL-XL) 25 MG 24 hr tablet TAKE 1 TABLET DAILY 90 tablet 3    vitamin E 800 UNIT capsule Take 800 Units by mouth once daily.      ELIQUIS 5 mg Tab TAKE 1 TABLET TWICE A  tablet 3    FREESTYLE AURE 14 DAY READER [facility] TEST BLOOD SUGAR BID. 6 each 6    FREESTYLE AURE 14 DAY  "SENSOR Kit USE TO TEST TWICE DAILY REMOVE AND REPLACE EEVRY 2 WEEKS 2 kit 3    FREESTYLE AURE 14 DAY SENSOR Kit USE TO TEST BLOOD SUGAR TWICE DAILY AND REPLACE EVERY 2 WEEKS 2 kit 6    tirzepatide (MOUNJARO) 5 mg/0.5 mL PnIj INJECT 5 MG UNDER THE SKIN EVERY 7 DAYS 12 pen 3    [DISCONTINUED] lancets (ONE TOUCH ULTRASOFT LANCETS) Misc 1 each by Misc.(Non-Drug; Combo Route) route 2 (two) times daily as needed. 100 each 2       Past Surgical History:   Procedure Laterality Date    BASAL CELL CARCINOMA EXCISION      CARDIOVERSION N/A 2018    Procedure: CARDIOVERSION;  Surgeon: Harvey Blake MD;  Location: [facility] CATH LAB;  Service: Cardiology;  Laterality: N/A;  AF, NEVAEH/DCCV, MAC, SK, 3 Prep    COLONOSCOPY N/A 4/10/2017    Procedure: COLONOSCOPY;  Surgeon: Rubin Solano MD;  Location: [facility] ENDO (4TH FLR);  Service: Endoscopy;  Laterality: N/A;    COLONOSCOPY N/A 2022    Procedure: COLONOSCOPY;  Surgeon: Darell Jeter MD;  Location: [facility] ENDO (2ND FLR);  Service: Endoscopy;  Laterality: N/A;  Extensive cardiac hx with h/o sina, instr. via portal - PC    PROSTATECTOMY      TONSILLECTOMY      TREATMENT OF CARDIAC ARRHYTHMIA N/A 2019    Procedure: CARDIOVERSION;  Surgeon: Harvey Blake MD;  Location: [facility] EP LAB;  Service: Cardiology;  Laterality: N/A;  AF, DCCV, MAC, SK, 3 PREP       CBC: No results for input(s): "WBC", "HGB", "HCT", "PLT" in the last 72 hours.    CMP: No results for input(s): "NA", "K", "CL", "CO2", "BUN", "CREATININE", "GLU", "MG", "PHOS", "CALCIUM", "ALBUMIN", "PROT", "ALKPHOS", "ALT", "AST", "BILITOT" in the last 72 hours.    COAGS  No results for input(s): "PT", "INR", "PROTIME", "APTT" in the last 72 hours.    BP Readings from Last 3 Encounters:   24 (!) 142/71   04/03/24 (!) 148/68   24 (!) 168/71       Diagnostic Studies:      EKD Echo:  Results for orders placed during the hospital encounter of 22    Echo    Interpretation Summary  · Bradycardia was present " during the study. HR mid 40s  · The estimated ejection fraction is 55%.  · The left ventricle is normal in size with concentric remodeling and normal systolic function.  · Normal left ventricular diastolic function.  · Normal right ventricular size with normal right ventricular systolic function.  · Severe left atrial enlargement.  · Moderate right atrial enlargement.  · Mild tricuspid regurgitation.  · The estimated PA systolic pressure is 28 mmHg.  · Intermediate central venous pressure (8 mmHg).        Pre-op Assessment    I have reviewed the Patient Summary Reports.     I have reviewed the Nursing Notes. I have reviewed the NPO Status.   I have reviewed the Medications.     Review of Systems  Anesthesia Hx:  No problems with previous Anesthesia                Hematology/Oncology:  Hematology Normal   Oncology Normal                                   EENT/Dental:  EENT/Dental Normal           Cardiovascular:  Exercise tolerance: good   Hypertension    Dysrhythmias   Denies Angina.       Denies WILLETT.                            Pulmonary:        Sleep Apnea                Renal/:  Renal/ Normal                 Hepatic/GI:     GERD, well controlled Liver Disease,            Musculoskeletal:  Musculoskeletal Normal                Neurological:    Neuromuscular Disease,                                   Endocrine:  Diabetes           Dermatological:  Skin Normal    Psych:  Psychiatric Normal                    Physical Exam  General: Well nourished, Cooperative, Alert and Oriented    Airway:  Mallampati: II   Mouth Opening: Normal  TM Distance: Normal  Neck ROM: Normal ROM        Anesthesia Plan  Type of Anesthesia, risks & benefits discussed:    Anesthesia Type: MAC, Gen Natural Airway  Intra-op Monitoring Plan: Standard ASA Monitors  Post Op Pain Control Plan: multimodal analgesia and IV/PO Opioids PRN  Induction:  IV  Informed Consent: Informed consent signed with the Patient and all parties understand the risks  and agree with anesthesia plan.  All questions answered.   ASA Score: 3  Day of Surgery Review of History & Physical: H&P Update referred to the surgeon/provider.    Ready For Surgery From Anesthesia Perspective.     .

## 2024-04-05 NOTE — DISCHARGE SUMMARY
[facility] - Surgery (Hospital)  Discharge Note  Short Stay    Procedure(s) (LRB):  EXCISION, CYST - mucus cyst right long finger (Right)      OUTCOME: Patient tolerated treatment/procedure well without complication and is now ready for discharge.    DISPOSITION: Home or Self Care    FINAL DIAGNOSIS:  Mucus cyst of right long finger    FOLLOWUP: In clinic    DISCHARGE INSTRUCTIONS:    Discharge Procedure Orders   Diet general     Call MD for:  temperature >100.4     Call MD for:  persistent nausea and vomiting     Call MD for:  severe uncontrolled pain     Call MD for:  difficulty breathing, headache or visual disturbances     Call MD for:  redness, tenderness, or signs of infection (pain, swelling, redness, odor or green/yellow discharge around incision site)     Call MD for:  hives     Call MD for:  persistent dizziness or light-headedness     Call MD for:  extreme fatigue     Leave dressing on - Keep it clean, dry, and intact until clinic visit        TIME SPENT ON DISCHARGE: 5 minutes

## 2024-04-08 LAB
FINAL PATHOLOGIC DIAGNOSIS: NORMAL
GROSS: NORMAL
Lab: NORMAL

## 2024-04-08 NOTE — ANESTHESIA POSTPROCEDURE EVALUATION
Anesthesia Post Evaluation    Patient: Erasmo Cherry    Procedure(s) Performed: Procedure(s) (LRB):  EXCISION, CYST - mucus cyst right long finger (Right)    Final Anesthesia Type: general      Patient location during evaluation: PACU  Patient participation: Yes- Able to Participate  Level of consciousness: awake and alert  Post-procedure vital signs: reviewed and stable  Pain management: adequate  Airway patency: patent    PONV status at discharge: No PONV  Anesthetic complications: no      Cardiovascular status: blood pressure returned to baseline  Respiratory status: unassisted  Hydration status: euvolemic  Follow-up not needed.              Vitals Value Taken Time   /64 04/05/24 1101   Temp 36.5 °C (97.7 °F) 04/05/24 1026   Pulse 72 04/05/24 1108   Resp 19 04/05/24 1107   SpO2 96 % 04/05/24 1108   Vitals shown include unvalidated device data.      Event Time   Out of Recovery 10:53:00         Pain/April Score: No data recorded

## 2024-04-10 NOTE — OP NOTE
DATE OF PROCEDURE: 4/5/2024     COVID-19 attestation:  Due to the nature of this patient's condition and/or the presence of pain, debilitty, and/or dysfunction, proceeding with surgery was indicated.  Furthermore, this patient was treated during the COVID-19 pandemic.  This was discussed with the patient, they are aware of our current policies and procedures, were given the option of delaying their surgery when applicable and if acceptable, and they elect to proceed.  Delaying this patient's surgery greater than 30 days would be detrimental to their care and functional outcome and/or cause additional suffering, pain, discomfort, and/or dysfunction/debility.  This was confirmed and we thus proceeded.       SERVICE:  Orthopedic Surgery.     SURGEON:  Daivd Peña M.D.      PREOPERATIVE DIAGNOSIS:    Right long finger mucus cyst     POSTOPERATIVE DIAGNOSIS:    Same     PROCEDURE(S) PERFORMED:    Excisional biopsy right long finger mucus cyst and bony debridement     TOURNIQUET TIME:    18 min at 250mmHg     ESTIMATED BLOOD LOSS:   3 mL.     IMPLANTS:   none     COMPLICATIONS:  None.     PACKS AND DRAINS:  None.     PATHOLOGIC SPECIMENS:   the excised tissue was sent for pathology.     ANESTHESIA:  Local MAC     IV FLUIDS: Crystalloid.     CONDITION:  Stable.     MICROBIOLOGY: None.     Indications for Procedure:      The patient is a 79yo male who presented with a significantly symptomatic mass to the right long finger.   The patient has not responded to adequate non operative treatment at this time and/or non operative treatment is not indicated. Thus, the risks, benefits and alternatives to surgery were discussed with the patient in detail.  Specific risks include but are not limited to bleeding, infection, vessel and/or nerve damage, pain, numbness, tingling, compartment syndrome, need for additional surgery, failure to return to pre-injury and/or preoperative functional status, inability to return to work, scar  sensitivity, delayed healing, complex regional pain syndrome, weakness, pulley injury, tendon injury, bowstringing, partial and/or incomplete relief of symptoms, persistence of and/or worsening of symptoms, hardware and/or surgical failure, prominent and/or symptomatic hardware possibly necessitating future removal, osteomyelitis, amputation, loss of function, stiffness, rotational malalignment, functional debility, dysfunction, decreased  strength, need for prolonged postoperative rehabilitation, malunion, nonunion, deep venous thrombosis, pulmonary embolism, arthritis and death.  The patient states an understanding and wishes to proceed with surgery.   All questions were answered.  No guarantees were implied or stated.  Written informed consent was obtained.     Procedure in detail:     On the date of the operative intervention, the patient was evaluated in the preoperative holding area.  With their participation the right upper extremity was marked at the operative site.  The mass in question was circled with the patient's participation.  The patient was then taken to the operating room where they were placed on OR table.  The right upper extremity extremity was then placed on a hand table.   nonsterile tourniquet was placed high on the right upper extremity.  The right upper extremity extremity was then prepped and draped in the usual sterile fashion and time-out was taken and confirmed by all present to confirm the correct patient site procedure and administration of preoperative antibiotics if ordered.  All were in agreement so I proceeded.  1% lidocaine was utilized for digital block to right long finger. An approximately 2 cm incision was next marked out and made with a scalpel overlying the right dorsal long finger.  This was centered overlying the mass.  Dissection was continued through skin subcutaneous tissues to the level of the mass.  This was then circumferentially identified and isolated. It was  excised in block and passed off the field for pathologic specimen.  The wound was then examined and there was no evidence of any residual or remaining soft tissue mass or cyst.  Dissection was continued to the level of the DIP joint taking great care to identify and preserve the terminal extensor tendon and cutaneous sensory nerves.  A rongeur was then utilized to perform extensive debridement of underlying osteophytes.   That wound was then irrigated with copious amounts of sterile saline.  The wound was then closed with 4-0 Chromic suture and dressed with a sterile dressing consisting of Xeroform 4 x 4 gauze cast padding and an ACE.   tourniquet was then discontinued and brisk capillary refill in [place] to the right long finger.  The patient was then awakened from anesthesia return the post anesthesia care in stable condition.  There were no complications.     Postoperative plan for the patient:  The patient be discharged home in stable condition.  Range of motion as tolerated without restrictions.  Follow-up in 2 weeks for suture removal and initiation of occupational therapy.

## 2024-04-17 ENCOUNTER — OFFICE VISIT (OUTPATIENT)
Dept: ORTHOPEDICS | Facility: CLINIC | Age: 79
End: 2024-04-17
Payer: MEDICARE

## 2024-04-17 VITALS — HEIGHT: 67 IN | BODY MASS INDEX: 28.16 KG/M2 | WEIGHT: 179.44 LBS

## 2024-04-17 DIAGNOSIS — M67.441 DIGITAL MUCINOUS CYST OF FINGER OF RIGHT HAND: Primary | ICD-10-CM

## 2024-04-17 DIAGNOSIS — Z98.890 POSTOPERATIVE STATE: ICD-10-CM

## 2024-04-17 PROCEDURE — 99213 OFFICE O/P EST LOW 20 MIN: CPT | Mod: PBBFAC | Performed by: PHYSICIAN ASSISTANT

## 2024-04-17 PROCEDURE — 99024 POSTOP FOLLOW-UP VISIT: CPT | Mod: POP,,, | Performed by: PHYSICIAN ASSISTANT

## 2024-04-17 PROCEDURE — 99999 PR PBB SHADOW E&M-EST. PATIENT-LVL III: CPT | Mod: PBBFAC,,, | Performed by: PHYSICIAN ASSISTANT

## 2025-01-13 NOTE — PROGRESS NOTES
Mr. Cherry is a patient of Dr. Blake and was last seen in clinic 3/7/2022.      Subjective:   Patient ID:  Erasmo Cherry is a 79 y.o. male who presents for follow up of Atrial Fibrillation  .     HPI:    Mr. Cherry is a 79 y.o. male with HTN, DM, chronic atrial fibrillation, colon polyps, NICM (recovered), RBBB here for follow up.     Background:    He is the  of [employer] in [place].    Presented to PCP for routine visit, ECG 12/14/2016 revealed atrial fibrillation, rate controlled. He was asymptomatic.  At f/u he remained in atrial fibrillation with controlled ventricular response.  He reported feeling fine.  Denies palpitations, shortness of breath, fatigue, syncope.    Of note has undergone liver biopsy x 2, diagnosed with WHEAT.  We initiated Eliquis 5 mg BID.  Echo 1/19/2017 EF 40-45% GENARO 46.89  I recommended DCCV.  He deferred.  Pet stress 3/30/2017 negative for ischemia.  Ultimately proceeded with DCCV 7/25/2018.  Toprol added. One week later was back out of rhythm.  As EF has not improved, I endorsed an attempt at rhythm control and re-assessing.  Options are PVI or amiodarone. He prefers, and I agree (short term) with the latter.     Plan was for Amiodarone 200 mg daily. DCCV in one month.    Underwent successful DCCV on 2/6/2018.   Was back out of rhythm at follow-up.  Amiodarone discontinued.    Continues to feel well.  Has resumed biking. Denies dyspnea on exertion.  Echo 3/25/19 EF 55% PASP 41mmHg  Holter 8/28/19 AF with average ventricular response of 56 bpm  Remains rate controlled, asymptomatic. EF is normal.    12/17/2020: He is here for annual follow up. He feels well. Ubaldo but denies LH. Will reduce metoprolol. Holter in 1 mo to assess heart rate curve. On eliquis for CVA prophylaxis (CHADSVASc 3).  Denies CHF symptoms. Exercises regularly. RTC 1 yr.    3/7/2022: He continues to do well in rate control strategy. No palpitations. No CHF symptoms. Remains very active.  Ubaldo - will stop metoprolol and update Holter. Update echo prior to next clinic visit.  CHADSVASc 3 on eliquis for CVA prophylaxis. BP elevated. Enroll in digital HTN program.    Update (01/15/2025):    3/21/2022 Holter: Atrial fibrillation with ventricular rates varying between 38 and 97 bpm with an average of 51 bpm.      Today he says he has been feeling well. No cardiac complaints. Mr. Cherry reports no chest pain with exertion or at rest, palpitations, SOB, WILLETT, dizziness, or syncope.  Normally biking but hasn't been doing this in the cold weather. No limitations of activity tolerance reported.    He is currently taking eliquis 5mg BID for stroke prophylaxis and denies significant bleeding episodes. Easy bruising after bumping himself.  He is currently being treated with metoprolol succinate 25mg daily for HR control.  Kidney function is stable, with a creatinine of 1.1 on 10/22/2024.    I have personally reviewed the patient's EKG today, which shows AF with RBBB  at 54bpm. QRS is 152. QT is 445.    Relevant Cardiac Test Results:    2D Echo (3/21/2022):  Bradycardia was present during the study. HR mid 40s  The estimated ejection fraction is 55%.  The left ventricle is normal in size with concentric remodeling and normal systolic function.  Normal left ventricular diastolic function.  Normal right ventricular size with normal right ventricular systolic function.  Severe left atrial enlargement.  Moderate right atrial enlargement.  Mild tricuspid regurgitation.  The estimated PA systolic pressure is 28 mmHg.  Intermediate central venous pressure (8 mmHg).    Current Outpatient Medications   Medication Sig    atorvastatin (LIPITOR) 20 MG tablet TAKE 1 TABLET DAILY    blood-glucose sensor (DEXCOM G7 SENSOR) Juli 1 Device by Misc.(Non-Drug; Combo Route) route every 14 (fourteen) days.    ELIQUIS 5 mg Tab TAKE 1 TABLET TWICE A DAY    ergocalciferol (VITAMIN D2) 50,000 unit Cap TAKE 1 CAPSULE TWICE A WEEK     "FREESTYLE AURE 14 DAY [place] [facility] [place] BID.    FREESTYLE AURE 14 DAY SENSOR Kit USE TO TEST BLOOD SUGAR TWICE DAILY AND REPLACE EVERY 2 WEEKS    FREESTYLE AURE 14 DAY SENSOR Kit USE TO TEST TWICE DAILY REMOVE AND REPLACE EEVRY 2 WEEKS    lisinopriL (PRINIVIL,ZESTRIL) 40 MG tablet Take 1 tablet (40 mg total) by mouth once daily.    metFORMIN (GLUCOPHAGE-XR) 750 MG ER 24hr tablet TAKE 1 TABLET TWICE A DAY WITH MEALS    metoprolol succinate (TOPROL-XL) 25 MG 24 hr tablet TAKE 1 TABLET DAILY    MOUNJARO 5 mg/0.5 mL PnIj INJECT 5 MG UNDER THE SKIN EVERY 7 DAYS    SYNTHROID 88 mcg tablet TAKE 1 TABLET BEFORE BREAKFAST    traMADoL (ULTRAM) 50 mg tablet Take 1 tablet (50 mg total) by mouth every 6 (six) hours.    vitamin E 800 UNIT capsule Take 800 Units by mouth once daily.     No current facility-administered medications for this visit.     Facility-Administered Medications Ordered in Other Visits   Medication    sodium chloride 0.9% flush 5 mL       Review of Systems   Constitutional: Negative for malaise/fatigue.   Cardiovascular:  Negative for chest pain, dyspnea on exertion, irregular heartbeat, leg swelling and palpitations.   Respiratory:  Negative for shortness of breath.    Hematologic/Lymphatic: Negative for bleeding problem. Bruises/bleeds easily.   Skin:  Negative for rash.   Musculoskeletal:  Negative for myalgias.   Gastrointestinal:  Negative for hematemesis, hematochezia and nausea.   Genitourinary:  Negative for hematuria.   Neurological:  Negative for light-headedness.   Psychiatric/Behavioral:  Negative for altered mental status.    Allergic/Immunologic: Negative for persistent infections.       Objective:          Pulse (!) 54   Ht 5' 7" (1.702 m)   Wt 82.6 kg (182 lb 1.6 oz)   BMI 28.52 kg/m²     Physical Exam  Vitals and nursing note reviewed.   Constitutional:       Appearance: Normal appearance. He is well-developed.   HENT:      Head: Normocephalic.      Nose: Nose normal.   Eyes: "      Pupils: Pupils are equal, round, and reactive to light.   Cardiovascular:      Rate and Rhythm: Bradycardia present. Rhythm irregularly irregular.   Pulmonary:      Effort: No respiratory distress.   Musculoskeletal:         General: Normal range of motion.   Skin:     General: Skin is warm and dry.      Findings: No erythema.   Neurological:      Mental Status: He is alert and oriented to person, place, and time.   Psychiatric:         Speech: Speech normal.         Behavior: Behavior normal.           Lab Results   Component Value Date     10/22/2024    K 4.5 10/22/2024    BUN 15 10/22/2024    CREATININE 1.1 10/22/2024    ALT 18 10/22/2024    AST 19 10/22/2024    HGB 14.1 10/22/2024    HCT 40.7 10/22/2024    TSH 4.478 (H) 10/22/2024    LDLCALC 45.0 (L) 02/09/2024             Assessment:     1. Persistent atrial fibrillation    2. Cardiomyopathy, nonischemic    3. RBBB    4. Anticoagulant long-term use    5. Obstructive sleep apnea        Plan:     In summary, Mr. Cherry is a 79 y.o. male with HTN, DM, chronic atrial fibrillation, colon polyps, NICM (recovered), RBBB here for follow up.   Continues to do well in longstanding persistent atrial fibrillation in rate control strategy. Stable RBBB on ECG. Ubaldo but asymptomatic. Will continue metoprolol given lack of LH and denial of activity tolerance due to hx of NICM. He was advised to stop it should he develop LH or fatigue, etc. CHADSVASc 5 on eliquis. He has easy bruising that comes and goes and asks about watchman. Discussed that would consider this in the future should he develop significant irreversible bleeding or recurrent falls. Overall he is doing well.    Continue current meds  RTC 1 yr with echo, sooner if needed      *A copy of this note has been sent to Dr. Blake*      ------------------------------------------------------------------    Maxine Barrera, APRN, NP-C  Cardiac Electrophysiology

## 2025-01-15 ENCOUNTER — OFFICE VISIT (OUTPATIENT)
Dept: ELECTROPHYSIOLOGY | Facility: CLINIC | Age: 80
End: 2025-01-15
Payer: MEDICARE

## 2025-01-15 VITALS — HEART RATE: 54 BPM | BODY MASS INDEX: 28.58 KG/M2 | HEIGHT: 67 IN | WEIGHT: 182.13 LBS

## 2025-01-15 DIAGNOSIS — Z79.01 ANTICOAGULANT LONG-TERM USE: ICD-10-CM

## 2025-01-15 DIAGNOSIS — I45.10 RBBB: ICD-10-CM

## 2025-01-15 DIAGNOSIS — G47.33 OBSTRUCTIVE SLEEP APNEA: ICD-10-CM

## 2025-01-15 DIAGNOSIS — I42.8 CARDIOMYOPATHY, NONISCHEMIC: ICD-10-CM

## 2025-01-15 DIAGNOSIS — I48.19 PERSISTENT ATRIAL FIBRILLATION: Primary | ICD-10-CM

## 2025-01-15 LAB
OHS QRS DURATION: 152 MS
OHS QTC CALCULATION: 445 MS

## 2025-01-15 PROCEDURE — 93005 ELECTROCARDIOGRAM TRACING: CPT | Mod: PBBFAC | Performed by: STUDENT IN AN ORGANIZED HEALTH CARE EDUCATION/TRAINING PROGRAM

## 2025-01-15 PROCEDURE — 99999 PR PBB SHADOW E&M-EST. PATIENT-LVL III: CPT | Mod: PBBFAC,,, | Performed by: NURSE PRACTITIONER

## 2025-01-15 PROCEDURE — 93010 ELECTROCARDIOGRAM REPORT: CPT | Mod: S$PBB,,, | Performed by: STUDENT IN AN ORGANIZED HEALTH CARE EDUCATION/TRAINING PROGRAM

## 2025-01-15 PROCEDURE — 99213 OFFICE O/P EST LOW 20 MIN: CPT | Mod: PBBFAC | Performed by: NURSE PRACTITIONER

## 2025-01-15 RX ORDER — METOPROLOL SUCCINATE 25 MG/1
25 TABLET, EXTENDED RELEASE ORAL DAILY
Qty: 90 TABLET | Refills: 3 | Status: SHIPPED | OUTPATIENT
Start: 2025-01-15

## 2025-02-17 NOTE — TELEPHONE ENCOUNTER
I called Mr. Cherry to discuss Monitor results per Dr. Blake, however, he did not answer. I left a voicemail asking him to call me back at his earliest convenience. Per previous note, he will keep me updated after he sees PCP.    no edema,  no murmurs,  regular rate and rhythm

## 2025-03-27 PROCEDURE — 82607 VITAMIN B-12: CPT | Performed by: INTERNAL MEDICINE

## 2025-03-27 PROCEDURE — 84153 ASSAY OF PSA TOTAL: CPT | Performed by: INTERNAL MEDICINE

## 2025-04-16 PROCEDURE — 82043 UR ALBUMIN QUANTITATIVE: CPT | Performed by: INTERNAL MEDICINE

## 2025-08-25 ENCOUNTER — OFFICE VISIT (OUTPATIENT)
Dept: DERMATOLOGY | Facility: CLINIC | Age: 80
End: 2025-08-25
Payer: MEDICARE

## 2025-08-25 DIAGNOSIS — D22.9 NEVUS: ICD-10-CM

## 2025-08-25 DIAGNOSIS — L82.1 SK (SEBORRHEIC KERATOSIS): ICD-10-CM

## 2025-08-25 DIAGNOSIS — L81.4 LENTIGO: ICD-10-CM

## 2025-08-25 DIAGNOSIS — Z85.828 PERSONAL HISTORY OF SKIN CANCER: ICD-10-CM

## 2025-08-25 DIAGNOSIS — L57.0 AK (ACTINIC KERATOSIS): ICD-10-CM

## 2025-08-25 DIAGNOSIS — L91.8 SKIN TAG: ICD-10-CM

## 2025-08-25 DIAGNOSIS — D48.5 NEOPLASM OF UNCERTAIN BEHAVIOR OF SKIN: Primary | ICD-10-CM

## 2025-08-25 PROCEDURE — 17003 DESTRUCT PREMALG LES 2-14: CPT | Mod: S$PBB,,, | Performed by: DERMATOLOGY

## 2025-08-25 PROCEDURE — 11103 TANGNTL BX SKIN EA SEP/ADDL: CPT | Mod: PBBFAC,PO | Performed by: DERMATOLOGY

## 2025-08-25 PROCEDURE — 99999 PR PBB SHADOW E&M-EST. PATIENT-LVL III: CPT | Mod: PBBFAC,,, | Performed by: DERMATOLOGY

## 2025-08-25 PROCEDURE — 99213 OFFICE O/P EST LOW 20 MIN: CPT | Mod: PBBFAC,PO,25 | Performed by: DERMATOLOGY

## 2025-08-25 PROCEDURE — 17003 DESTRUCT PREMALG LES 2-14: CPT | Mod: 59,PBBFAC,PO | Performed by: DERMATOLOGY

## 2025-08-25 PROCEDURE — 17000 DESTRUCT PREMALG LESION: CPT | Mod: 59,PBBFAC,PO | Performed by: DERMATOLOGY

## 2025-08-25 PROCEDURE — 11102 TANGNTL BX SKIN SINGLE LES: CPT | Mod: S$PBB,,, | Performed by: DERMATOLOGY

## 2025-08-25 PROCEDURE — 88305 TISSUE EXAM BY PATHOLOGIST: CPT | Mod: TC,91 | Performed by: DERMATOLOGY

## 2025-08-25 PROCEDURE — 11102 TANGNTL BX SKIN SINGLE LES: CPT | Mod: PBBFAC,PO | Performed by: DERMATOLOGY

## 2025-08-25 PROCEDURE — 99213 OFFICE O/P EST LOW 20 MIN: CPT | Mod: 25,S$PBB,, | Performed by: DERMATOLOGY

## 2025-08-25 PROCEDURE — 17000 DESTRUCT PREMALG LESION: CPT | Mod: S$PBB,XS,, | Performed by: DERMATOLOGY

## 2025-08-25 PROCEDURE — 11103 TANGNTL BX SKIN EA SEP/ADDL: CPT | Mod: S$PBB,,, | Performed by: DERMATOLOGY

## 2025-08-27 LAB
ESTROGEN SERPL-MCNC: NORMAL PG/ML
INSULIN SERPL-ACNC: NORMAL U[IU]/ML
LAB AP CLINICAL INFORMATION: NORMAL
LAB AP GROSS DESCRIPTION: NORMAL
LAB AP PERFORMING LOCATION(S): NORMAL
LAB AP REPORT FOOTNOTES: NORMAL
T3RU NFR SERPL: NORMAL %

## 2025-08-28 ENCOUNTER — RESULTS FOLLOW-UP (OUTPATIENT)
Dept: DERMATOLOGY | Facility: CLINIC | Age: 80
End: 2025-08-28
Payer: MEDICARE

## (undated) DEVICE — COVER LIGHT HANDLE 80/CA

## (undated) DEVICE — GLOVE BIOGEL PI MICRO SZ 7.5

## (undated) DEVICE — DRAPE STERI-DRAPE 1000 17X11IN

## (undated) DEVICE — PAD PREP CUFFED NS 24X48IN

## (undated) DEVICE — SPONGE COTTON TRAY 4X4IN

## (undated) DEVICE — DRAPE U SPLIT SHEET 54X76IN

## (undated) DEVICE — COVER CAMERA OPERATING ROOM

## (undated) DEVICE — UNDERGLOVES BIOGEL PI SIZE 8

## (undated) DEVICE — SUT VICRYL 4-0 RB1 27IN UD

## (undated) DEVICE — SOL IRR SOD CHL .9% POUR

## (undated) DEVICE — PAD CAST SPECIALIST STRL 4

## (undated) DEVICE — Device

## (undated) DEVICE — TOURNIQUET SB QC DP 18X4IN

## (undated) DEVICE — BANDAGE MATRIX HK LOOP 4IN 5YD

## (undated) DEVICE — DRESSING XEROFORM NONADH 1X8IN